# Patient Record
Sex: MALE | Race: WHITE | NOT HISPANIC OR LATINO | Employment: OTHER | URBAN - METROPOLITAN AREA
[De-identification: names, ages, dates, MRNs, and addresses within clinical notes are randomized per-mention and may not be internally consistent; named-entity substitution may affect disease eponyms.]

---

## 2017-02-12 ENCOUNTER — HOSPITAL ENCOUNTER (EMERGENCY)
Facility: HOSPITAL | Age: 57
Discharge: HOME | End: 2017-02-12
Attending: EMERGENCY MEDICINE
Payer: MEDICAID

## 2017-02-12 VITALS
RESPIRATION RATE: 14 BRPM | HEART RATE: 94 BPM | HEIGHT: 68 IN | SYSTOLIC BLOOD PRESSURE: 123 MMHG | OXYGEN SATURATION: 96 % | BODY MASS INDEX: 31.83 KG/M2 | DIASTOLIC BLOOD PRESSURE: 74 MMHG | WEIGHT: 210 LBS | TEMPERATURE: 99 F

## 2017-02-12 DIAGNOSIS — I48.91 ATRIAL FIBRILLATION WITH RVR: Primary | ICD-10-CM

## 2017-02-12 LAB
ALBUMIN SERPL BCP-MCNC: 3.8 G/DL
ALP SERPL-CCNC: 52 U/L
ALT SERPL W/O P-5'-P-CCNC: 40 U/L
AMPHET+METHAMPHET UR QL: NEGATIVE
ANION GAP SERPL CALC-SCNC: 10 MMOL/L
AST SERPL-CCNC: 32 U/L
BACTERIA #/AREA URNS HPF: NORMAL /HPF
BARBITURATES UR QL SCN>200 NG/ML: NEGATIVE
BASOPHILS # BLD AUTO: 0.05 K/UL
BASOPHILS NFR BLD: 0.6 %
BENZODIAZ UR QL SCN>200 NG/ML: NEGATIVE
BILIRUB SERPL-MCNC: 0.4 MG/DL
BILIRUB UR QL STRIP: NEGATIVE
BUN SERPL-MCNC: 19 MG/DL
BZE UR QL SCN: NEGATIVE
CALCIUM SERPL-MCNC: 9.4 MG/DL
CANNABINOIDS UR QL SCN: NEGATIVE
CHLORIDE SERPL-SCNC: 108 MMOL/L
CLARITY UR: CLEAR
CO2 SERPL-SCNC: 25 MMOL/L
COLOR UR: COLORLESS
CREAT SERPL-MCNC: 0.9 MG/DL
CREAT UR-MCNC: 15.6 MG/DL
DIFFERENTIAL METHOD: NORMAL
EOSINOPHIL # BLD AUTO: 0.3 K/UL
EOSINOPHIL NFR BLD: 3.5 %
ERYTHROCYTE [DISTWIDTH] IN BLOOD BY AUTOMATED COUNT: 13.8 %
EST. GFR  (AFRICAN AMERICAN): >60 ML/MIN/1.73 M^2
EST. GFR  (NON AFRICAN AMERICAN): >60 ML/MIN/1.73 M^2
ETHANOL SERPL-MCNC: <10 MG/DL
GLUCOSE SERPL-MCNC: 141 MG/DL
GLUCOSE UR QL STRIP: NEGATIVE
HCT VFR BLD AUTO: 44.3 %
HGB BLD-MCNC: 15.2 G/DL
HGB UR QL STRIP: ABNORMAL
KETONES UR QL STRIP: NEGATIVE
LEUKOCYTE ESTERASE UR QL STRIP: NEGATIVE
LYMPHOCYTES # BLD AUTO: 3.7 K/UL
LYMPHOCYTES NFR BLD: 41.1 %
MAGNESIUM SERPL-MCNC: 2.3 MG/DL
MCH RBC QN AUTO: 30.7 PG
MCHC RBC AUTO-ENTMCNC: 34.3 %
MCV RBC AUTO: 90 FL
METHADONE UR QL SCN>300 NG/ML: NEGATIVE
MICROSCOPIC COMMENT: NORMAL
MONOCYTES # BLD AUTO: 0.8 K/UL
MONOCYTES NFR BLD: 9.1 %
NEUTROPHILS # BLD AUTO: 4.1 K/UL
NEUTROPHILS NFR BLD: 45.7 %
NITRITE UR QL STRIP: NEGATIVE
OPIATES UR QL SCN: NEGATIVE
PCP UR QL SCN>25 NG/ML: NEGATIVE
PH UR STRIP: 7 [PH] (ref 5–8)
PHOSPHATE SERPL-MCNC: 2.9 MG/DL
PLATELET # BLD AUTO: 270 K/UL
PMV BLD AUTO: 9.8 FL
POTASSIUM SERPL-SCNC: 3.7 MMOL/L
PROT SERPL-MCNC: 6.9 G/DL
PROT UR QL STRIP: NEGATIVE
RBC # BLD AUTO: 4.95 M/UL
RBC #/AREA URNS HPF: 2 /HPF (ref 0–4)
SODIUM SERPL-SCNC: 143 MMOL/L
SP GR UR STRIP: 1 (ref 1–1.03)
TOXICOLOGY INFORMATION: ABNORMAL
TROPONIN I SERPL DL<=0.01 NG/ML-MCNC: 0.01 NG/ML
TSH SERPL DL<=0.005 MIU/L-ACNC: 1.87 UIU/ML
URN SPEC COLLECT METH UR: ABNORMAL
UROBILINOGEN UR STRIP-ACNC: NEGATIVE EU/DL
WBC # BLD AUTO: 9.02 K/UL
WBC #/AREA URNS HPF: 1 /HPF (ref 0–5)

## 2017-02-12 PROCEDURE — 84443 ASSAY THYROID STIM HORMONE: CPT

## 2017-02-12 PROCEDURE — 83735 ASSAY OF MAGNESIUM: CPT

## 2017-02-12 PROCEDURE — 96374 THER/PROPH/DIAG INJ IV PUSH: CPT

## 2017-02-12 PROCEDURE — 82570 ASSAY OF URINE CREATININE: CPT

## 2017-02-12 PROCEDURE — 81000 URINALYSIS NONAUTO W/SCOPE: CPT

## 2017-02-12 PROCEDURE — 84484 ASSAY OF TROPONIN QUANT: CPT

## 2017-02-12 PROCEDURE — 96376 TX/PRO/DX INJ SAME DRUG ADON: CPT

## 2017-02-12 PROCEDURE — 84100 ASSAY OF PHOSPHORUS: CPT

## 2017-02-12 PROCEDURE — 80320 DRUG SCREEN QUANTALCOHOLS: CPT

## 2017-02-12 PROCEDURE — 85025 COMPLETE CBC W/AUTO DIFF WBC: CPT

## 2017-02-12 PROCEDURE — 25000003 PHARM REV CODE 250: Performed by: EMERGENCY MEDICINE

## 2017-02-12 PROCEDURE — 80053 COMPREHEN METABOLIC PANEL: CPT

## 2017-02-12 PROCEDURE — 99285 EMERGENCY DEPT VISIT HI MDM: CPT | Mod: 25

## 2017-02-12 RX ORDER — DILTIAZEM HYDROCHLORIDE 5 MG/ML
15 INJECTION INTRAVENOUS
Status: COMPLETED | OUTPATIENT
Start: 2017-02-12 | End: 2017-02-12

## 2017-02-12 RX ORDER — DILTIAZEM HYDROCHLORIDE 120 MG/1
120 CAPSULE, EXTENDED RELEASE ORAL DAILY
Qty: 14 CAPSULE | Refills: 0 | Status: ON HOLD | OUTPATIENT
Start: 2017-02-12 | End: 2017-04-09

## 2017-02-12 RX ORDER — DILTIAZEM HYDROCHLORIDE 120 MG/1
120 CAPSULE, COATED, EXTENDED RELEASE ORAL
Status: COMPLETED | OUTPATIENT
Start: 2017-02-12 | End: 2017-02-12

## 2017-02-12 RX ADMIN — DILTIAZEM HYDROCHLORIDE 15 MG: 5 INJECTION INTRAVENOUS at 04:02

## 2017-02-12 RX ADMIN — NIFEDIPINE 120 MG: 10 CAPSULE ORAL at 06:02

## 2017-02-12 RX ADMIN — DILTIAZEM HYDROCHLORIDE 15 MG: 5 INJECTION INTRAVENOUS at 05:02

## 2017-02-12 NOTE — ED NOTES
Pt reports that he feels much better from the medicine and can feel that his heart beat has slowed down.

## 2017-02-12 NOTE — ED PROVIDER NOTES
Encounter Date: 2/12/2017    SCRIBE #1 NOTE: I, Cookie Scales, am scribing for, and in the presence of,  Vishal Baum MD. I have scribed the following portions of the note - Other sections scribed: HPI, ROS.       History     Chief Complaint   Patient presents with    Palpitations     Ems reports the pt c/o heart palpitation while sleeping that woke him up. Pt a fib with rvr      Review of patient's allergies indicates:   Allergen Reactions    Eggs [egg derived] Anaphylaxis    Ativan [lorazepam]      nausea    Corticosteroids (glucocorticoids) Palpitations     HPI Comments: CC: Palpitations    HPI: 56 year old male smoker with A-fib, chronic back pain, and anxiety presents to the ED via EMS c/o acute onset palpitations with associated SOB, chills, and light-headedness PTA. Pt states he was awake and lying down when the symptoms began. Previous similar episode 8 months ago. Pt states he would go into A-fib and then have a normal heart rate spontaneously right after. Pt denies fever, chest pain, nausea, vomiting, diarrhea, and any other associated symptoms. No prior treatment.    The history is provided by the patient. No  was used.     Past Medical History   Diagnosis Date    Anxiety     Atrial fibrillation     Chronic back pain      Past Medical History Pertinent Negatives   Diagnosis Date Noted    Diabetes mellitus 8/21/2013    Hypertension 6/15/2013    Renal disorder 6/15/2013    Seizures 6/15/2013    Stroke 6/15/2013     Past Surgical History   Procedure Laterality Date    Cholecystectomy      Hernia repair      Nasal septum surgery      Rectal surgery       History reviewed. No pertinent family history.  Social History   Substance Use Topics    Smoking status: Current Every Day Smoker     Packs/day: 0.50     Types: Cigarettes     Last attempt to quit: 12/7/2010    Smokeless tobacco: Never Used    Alcohol use No     Review of Systems   Constitutional: Positive for chills.  Negative for diaphoresis and fever.   HENT: Negative for ear pain and sore throat.    Eyes: Negative for photophobia and visual disturbance.   Respiratory: Positive for shortness of breath. Negative for cough.    Cardiovascular: Positive for palpitations. Negative for chest pain.   Gastrointestinal: Negative for abdominal pain, diarrhea, nausea and vomiting.   Genitourinary: Negative for dysuria.   Musculoskeletal: Negative for back pain.   Skin: Negative for rash.   Neurological: Positive for light-headedness. Negative for headaches.       Physical Exam   Initial Vitals   BP Pulse Resp Temp SpO2   02/12/17 0358 02/12/17 0358 02/12/17 0358 02/12/17 0358 02/12/17 0358   170/104 120 18 98.8 °F (37.1 °C) 98 %     Physical Exam    Constitutional: He appears well-developed and well-nourished. He is not diaphoretic. No distress.   HENT:   Head: Normocephalic and atraumatic.   Nose: Nose normal.   Mouth/Throat: Oropharynx is clear and moist. No oropharyngeal exudate.   Eyes: Conjunctivae and EOM are normal. Pupils are equal, round, and reactive to light. No scleral icterus.   Neck: Normal range of motion. Neck supple. No thyromegaly present. No tracheal deviation present.   Cardiovascular: Normal heart sounds. An irregularly irregular rhythm present. Tachycardia present.  Exam reveals no gallop and no friction rub.    No murmur heard.  Pulmonary/Chest: Breath sounds normal. No respiratory distress. He has no wheezes. He has no rhonchi. He has no rales.   Abdominal: Soft. Bowel sounds are normal. He exhibits no distension and no mass. There is no tenderness. There is no rebound and no guarding.   Musculoskeletal: Normal range of motion. He exhibits edema (trace pitting b/l). He exhibits no tenderness.   Lymphadenopathy:     He has no cervical adenopathy.   Neurological: He is alert and oriented to person, place, and time. He has normal strength. No cranial nerve deficit or sensory deficit.   Skin: Skin is warm and dry. No  rash noted. No erythema. No pallor.   Psychiatric: He has a normal mood and affect. His behavior is normal. Thought content normal.         ED Course   Procedures  Labs Reviewed   CBC W/ AUTO DIFFERENTIAL   COMPREHENSIVE METABOLIC PANEL   TROPONIN I   TSH   MAGNESIUM   PHOSPHORUS   ALCOHOL,MEDICAL (ETHANOL)   DRUG SCREEN PANEL, URINE EMERGENCY   URINALYSIS             Medical Decision Making:   Initial Assessment:   56-year-old male who reports one past episode of atrial fibrillation that spontaneously resolved brought in by EMS for sudden onset severe shortness of breath that began immediately prior to arrival.  EMS reports the, patient in atrial fibrillation with RVR.  Patient now reports he is feeling much better, but currently on exam his heart rate is averaging 130s to 140s, with peaks in the 160s.  There is no evidence of heart failure.  The patient has had no symptoms to suggest acute coronary syndrome, including no vomiting, diaphoresis, chest pain.   Differential Diagnosis:   Paroxysmal atrial fibrillation, electrolyte abnormality, medication/drug effect, medication/drug withdrawal, a right abnormality, infection, dehydration, other metabolic disturbance  Independently Interpreted Test(s):   I have ordered and independently interpreted X-rays - see summary below.       <> Summary of X-Ray Reading(s): Chest x-ray: No acute abnormality  I have ordered and independently interpreted EKG Reading(s) - see summary below       <> Summary of EKG Reading(s): Atrial fibrillation at 136 bpm, otherwise normal intervals, right axis deviation, no acute ischemic change  ED Management:  Patient's workup does not reveal any significant abnormality.  His heart rate is well controlled after 25 mg of IV diltiazem, now averaging .  He is very strongly resistant to the idea of taking a beta blocker, stating that he has heard to any people with side effects of lethargy, depression, etc.  Will prescribe sustained released  calcium channel blocker as an alternative.  I have recommended follow-up with primary care and cardiology and counseled him regarding the results of his workup, providing return precautions.            Scribe Attestation:   Scribe #1: I performed the above scribed service and the documentation accurately describes the services I performed. I attest to the accuracy of the note.    Attending Attestation:           Physician Attestation for Scribe:  Physician Attestation Statement for Scribe #1: I, Vishal Baum MD, reviewed documentation, as scribed by Cookie Scales in my presence, and it is both accurate and complete.                 ED Course     Clinical Impression:   The encounter diagnosis was Atrial fibrillation with RVR.          Vishal Baum III, MD  02/12/17 0611

## 2017-02-12 NOTE — ED TRIAGE NOTES
"Pt reports to ED via EMS for c/o palpitations, SOB, & dizziness starting tonight while he was at home listening to music; upon ems arrival pt in A Fib with RVR's; pt has hx of A fib with last "A fib attack" in June 2016; pt AAOx4; pt currently denies feeling palpitations, SOB, and dizziness and states that he is feeling better and his "A fib will go back to normal within the hour"; pt tachycardic with HR ranging 130's-150's; will continue to monitor.  "

## 2017-02-12 NOTE — DISCHARGE INSTRUCTIONS
Return to the emergency department if you develop difficulty breathing, lightheadedness, racing heartbeat, chest pain, or for any new or worsening medical concerns.        What Is Atrial Flutter/Atrial Fibrillation?      The heart has its own electrical system. This system makes the signals that start each heartbeat. The heartbeat begins in 1 of the 2 upper chambers of the heart (atria). A problem can make the atria beat faster than normal. The atria may beat fast but still evenly. This problem is called atrial flutter. If the atria beat very fast and also unevenly, it is called atrial fibrillation (AFib).  Causes of Atrial Flutter and Atrial Fibrillation  Causes of these problems can include:  · Previous heart attack  · High blood pressure  · Thyroid problems  In many cases, the cause is unknown.  When the Atria Beat Too Fast  The atria may beat fast only once in a while. This is called a paroxysmal heart rhythm problem. If they beat fast all the time, it is a chronic problem.  Atrial Flutter  With atrial flutter, electrical signals travel around and around inside the atria. These circling signals make the atria beat too fast:  · Atrial flutter can cause symptoms similar to AFib. It can also lead to the even faster, uneven rhythms of AFib.  Atrial Fibrillation (AFib)  With AFib, cells in the atria send extra electrical signals. These extra signals make the atria beat very fast. They also beat unevenly:  · The atria beat so fast and unevenly that they may quiver instead of lane. If the atria dont contract, they dont move enough blood into the 2 lower chambers of the heart (ventricles). This can cause you to feel dizzy or weak.  · Blood that doesnt keep moving can pool and form clots in the atria. These clots can move into other parts of the body and cause serious problems such as a stroke.   Symptoms of Atrial Flutter and AFib  These symptoms include the following:  · Palpitations (a fluttering, fast  heartbeat)  · Weakness or tiredness  · Shortness of breath  · Chest pain or tightness  · Dizziness or lightheadedness  · Fainting spells   Date Last Reviewed: 2/26/2014  © 1777-3041 [employer]. [address] [place] [address], [place] [address]. All rights reserved. This information is not intended as a substitute for professional medical care. Always follow your healthcare professional's instructions.          Discharge Instructions: Taking Calcium Channel Blockers  Your healthcare provider prescribed a medicine called a calcium channel blocker for you. This type of medicine can treat high blood pressure, correct abnormal heart rhythms, and relieve a type of chest pain called angina.     The name of your calcium channel blocker is  _____________________      Home care  · Follow the fact sheet that came with your medicine. It tells you when and how to take your medicine. Ask for a sheet if you didnt get one.  · Take this medicine exactly as directed, even if you feel fine.  · If you miss a dose of this medicine, take it as soon as you remember--unless its almost time for your next dose. In that case, just wait and take your next dose at the normal time. Dont take a double dose. If you aren't sure what to do, call your healthcare provider or your pharmacist.  · Dont drive until you know how you will react to this medicine.  · Tell your healthcare provider about any other medicines or herbal remedies you are using.  · Be sure to give this medicine time to work. It may take several weeks to lower blood pressure.  · Learn to take your own pulse. Keep a record of your results. Ask your provider which pulse rates mean that you need medical attention.  · Dont eat grapefruit or drink grapefruit juice. These may interact with calcium channel blockers.  · Ask your healthcare provider how much exercise and activity is safe.  · See your provider regularly while taking this medicine.  Possible side effects  Tell your  healthcare provider if you have any of these side effects. Dont stop taking the medicine unless your doctor tells you to. Mild side effects include:  · Sore, bleeding gums  · Mild headache  · Dizziness or lightheadedness  · Flushing  · Nausea  · Leg swelling  When to call your healthcare provider  Call your healthcare provider right away if you have any of the following:  · Severe headache  · Slow, weak pulse  · Breathing problems, coughing, or wheezing  · Irregular, fast, or pounding heartbeat  · Skin rash  · Swollen ankles, feet, or lower legs  · Constipation   Date Last Reviewed: 6/1/2016  © 2650-5942 [employer]. [address] [place] [address], [place] [address]. All rights reserved. This information is not intended as a substitute for professional medical care. Always follow your healthcare professional's instructions.

## 2017-02-12 NOTE — ED AVS SNAPSHOT
[facility]  [address] [place] [address]               Timmy Wade   2017  4:09 AM   ED    Description:  Male : 1960   Department:  [facility]           Your Care was Coordinated By:     Provider Role From To    Vishal Baum III, MD Attending Provider 17 0415 --      Reason for Visit     Palpitations           Diagnoses this Visit        Comments    Atrial fibrillation with RVR    -  Primary       ED Disposition     None           To Do List           Follow-up Information     Follow up with Eulogio Owusu MD. Call in 1 day.    Specialty:  Cardiology    Why:  for Cardiology    Contact information:    [address]  [address]  [place] [address]  [phone]          Follow up with Keshia Lion MD. Call in 1 day.    Specialty:  Family Medicine    Why:  for Primary Care    Contact information:    [address] [address]  [phone]         These Medications        Disp Refills Start End    diltiaZEM (DILACOR XR) 120 MG CDCR 14 capsule 0 2017    Take 1 capsule (120 mg total) by mouth once daily. - Oral    Pharmacy: [facility] [address] #: [phone]         [facility] On Call     [facility] On Call Nurse Care Line -  Assistance  Registered nurses in the [facility] provide clinical advisement, health education, appointment booking, and other advisory services.  Call for this free service at [phone].             Medications           Message regarding Medications     Verify the changes and/or additions to your medication regime listed below are the same as discussed with your clinician today.  If any of these changes or additions are incorrect, please notify your healthcare provider.        START taking these NEW medications        Refills    diltiaZEM (DILACOR XR) 120 MG CDCR 0    Sig: Take 1 capsule (120 mg  "total) by mouth once daily.    Class: Print    Route: Oral      These medications were administered today        Dose Freq    diltiaZEM injection 15 mg 15 mg ED 1 Time    Sig: Inject 3 mLs (15 mg total) into the vein ED 1 Time.    Class: Normal    Route: Intravenous    diltiaZEM injection 15 mg 15 mg ED 1 Time    Sig: Inject 3 mLs (15 mg total) into the vein ED 1 Time.    Class: Normal    Route: Intravenous    diltiaZEM 24 hr capsule 120 mg 120 mg ED 1 Time    Sig: Take 1 capsule (120 mg total) by mouth ED 1 Time.    Class: Normal    Route: Oral      STOP taking these medications     budesonide (RINOCORT AQUA) 32 mcg/actuation nasal spray 1 spray (32 mcg total) by Nasal route once daily.           Verify that the below list of medications is an accurate representation of the medications you are currently taking.  If none reported, the list may be blank. If incorrect, please contact your healthcare provider. Carry this list with you in case of emergency.           Current Medications     clonazepam (KLONOPIN) 2 MG Tab Take 2 mg by mouth 2 (two) times daily.      oxycodone-acetaminophen (PERCOCET)  mg per tablet Take 1 tablet by mouth every 4 (four) hours as needed for Pain.    diltiaZEM (DILACOR XR) 120 MG CDCR Take 1 capsule (120 mg total) by mouth once daily.    diltiaZEM 24 hr capsule 120 mg Take 1 capsule (120 mg total) by mouth ED 1 Time.    diltiaZEM injection 15 mg Inject 3 mLs (15 mg total) into the vein ED 1 Time.    diltiaZEM injection 15 mg Inject 3 mLs (15 mg total) into the vein ED 1 Time.           Clinical Reference Information           Your Vitals Were     BP Pulse Temp Resp Height Weight    123/73 100 98.8 °F (37.1 °C) (Oral) 18 5' 8" (1.727 m) 95.3 kg (210 lb)    SpO2 BMI             96% 31.93 kg/m2         Allergies as of 2/12/2017        Reactions    Eggs [Egg Derived] Anaphylaxis    Ativan [Lorazepam]     nausea    Corticosteroids (Glucocorticoids) Palpitations      Immunizations " Administered on Date of Encounter - 2/12/2017     None      ED Micro, Lab, POCT     Start Ordered       Status Ordering Provider    02/12/17 0417 02/12/17 0417  CBC auto differential  STAT      Final result     02/12/17 0417 02/12/17 0417  Comprehensive metabolic panel  STAT      Final result     02/12/17 0417 02/12/17 0417  Troponin I  STAT      Final result     02/12/17 0417 02/12/17 0417  TSH  STAT      Final result     02/12/17 0417 02/12/17 0417  Magnesium  STAT      Final result     02/12/17 0417 02/12/17 0417  Phosphorus  STAT      Final result     02/12/17 0417 02/12/17 0417  Ethanol  Once      Final result     02/12/17 0417 02/12/17 0417  Drug screen panel, emergency  STAT      Final result     02/12/17 0417 02/12/17 0417  Urinalysis  STAT      Final result     02/12/17 0417 02/12/17 0417  Urinalysis Microscopic  Once      Final result       ED Imaging Orders     Start Ordered       Status Ordering Provider    02/12/17 0417 02/12/17 0417  X-Ray Chest 1 View  1 time imaging      Final result         Discharge Instructions       Return to the emergency department if you develop difficulty breathing, lightheadedness, racing heartbeat, chest pain, or for any new or worsening medical concerns.        What Is Atrial Flutter/Atrial Fibrillation?      The heart has its own electrical system. This system makes the signals that start each heartbeat. The heartbeat begins in 1 of the 2 upper chambers of the heart (atria). A problem can make the atria beat faster than normal. The atria may beat fast but still evenly. This problem is called atrial flutter. If the atria beat very fast and also unevenly, it is called atrial fibrillation (AFib).  Causes of Atrial Flutter and Atrial Fibrillation  Causes of these problems can include:  · Previous heart attack  · High blood pressure  · Thyroid problems  In many cases, the cause is unknown.  When the Atria Beat Too Fast  The atria may beat fast only once in a while. This is  called a paroxysmal heart rhythm problem. If they beat fast all the time, it is a chronic problem.  Atrial Flutter  With atrial flutter, electrical signals travel around and around inside the atria. These circling signals make the atria beat too fast:  · Atrial flutter can cause symptoms similar to AFib. It can also lead to the even faster, uneven rhythms of AFib.  Atrial Fibrillation (AFib)  With AFib, cells in the atria send extra electrical signals. These extra signals make the atria beat very fast. They also beat unevenly:  · The atria beat so fast and unevenly that they may quiver instead of lane. If the atria dont contract, they dont move enough blood into the 2 lower chambers of the heart (ventricles). This can cause you to feel dizzy or weak.  · Blood that doesnt keep moving can pool and form clots in the atria. These clots can move into other parts of the body and cause serious problems such as a stroke.   Symptoms of Atrial Flutter and AFib  These symptoms include the following:  · Palpitations (a fluttering, fast heartbeat)  · Weakness or tiredness  · Shortness of breath  · Chest pain or tightness  · Dizziness or lightheadedness  · Fainting spells   Date Last Reviewed: 2/26/2014  © 6182-9935 [employer]. [address] [place] [address]. All rights reserved. This information is not intended as a substitute for professional medical care. Always follow your healthcare professional's instructions.          Discharge Instructions: Taking Calcium Channel Blockers  Your healthcare provider prescribed a medicine called a calcium channel blocker for you. This type of medicine can treat high blood pressure, correct abnormal heart rhythms, and relieve a type of chest pain called angina.     The name of your calcium channel blocker is  _____________________      Home care  · Follow the fact sheet that came with your medicine. It tells you when and how to take your medicine. Ask for a  sheet if you didnt get one.  · Take this medicine exactly as directed, even if you feel fine.  · If you miss a dose of this medicine, take it as soon as you remember--unless its almost time for your next dose. In that case, just wait and take your next dose at the normal time. Dont take a double dose. If you aren't sure what to do, call your healthcare provider or your pharmacist.  · Dont drive until you know how you will react to this medicine.  · Tell your healthcare provider about any other medicines or herbal remedies you are using.  · Be sure to give this medicine time to work. It may take several weeks to lower blood pressure.  · Learn to take your own pulse. Keep a record of your results. Ask your provider which pulse rates mean that you need medical attention.  · Dont eat grapefruit or drink grapefruit juice. These may interact with calcium channel blockers.  · Ask your healthcare provider how much exercise and activity is safe.  · See your provider regularly while taking this medicine.  Possible side effects  Tell your healthcare provider if you have any of these side effects. Dont stop taking the medicine unless your doctor tells you to. Mild side effects include:  · Sore, bleeding gums  · Mild headache  · Dizziness or lightheadedness  · Flushing  · Nausea  · Leg swelling  When to call your healthcare provider  Call your healthcare provider right away if you have any of the following:  · Severe headache  · Slow, weak pulse  · Breathing problems, coughing, or wheezing  · Irregular, fast, or pounding heartbeat  · Skin rash  · Swollen ankles, feet, or lower legs  · Constipation   Date Last Reviewed: 6/1/2016  © 9795-1080 [employer]. [address] [place] [address], [place] [address]. All rights reserved. This information is not intended as a substitute for professional medical care. Always follow your healthcare professional's instructions.          [facility] Sign-Up     Activating your [facility]  account is as easy as 1-2-3!     1) Visit [email or web address], select Sign Up Now, enter this activation code and your date of birth, then select Next.  [email or web address]  Expires: 3/29/2017  6:03 AM      2) Create a username and password to use when you visit [facility] in the future and select a security question in case you lose your password and select Next.    3) Enter your e-mail address and click Sign Up!    Additional Information  If you have questions, please e-mail [email or web address] or call [phone] to talk to our [facility] staff. Remember, [facility] is NOT to be used for urgent needs. For medical emergencies, dial 911.         Smoking Cessation     If you would like to quit smoking:   You may be eligible for free services if you are a [place] resident and started smoking cigarettes before September 1, 1988.  Call the Smoking Cessation Trust ([facility]) toll free at (424) [phone] or [phone].   Call 1-287-QUIT-NOW if you do not meet the above criteria.             [facility] complies with applicable Federal civil rights laws and does not discriminate on the basis of race, color, national origin, age, disability, or sex.        Language Assistance Services     ATTENTION: Language assistance services are available, free of charge. Please call [phone].      ATENCIÓN: Si habla español, tiene a ferro disposición servicios gratuitos de asistencia lingüística. Llame al [phone].     CHÚ Ý: N?u b?n nói Ti?ng Vi?t, có các d?ch v? h? tr? ngôn ng? mi?n phí dành cho b?n. G?i s? [phone].

## 2017-04-08 ENCOUNTER — HOSPITAL ENCOUNTER (INPATIENT)
Facility: HOSPITAL | Age: 57
LOS: 1 days | Discharge: HOME | DRG: 310 | End: 2017-04-09
Attending: EMERGENCY MEDICINE | Admitting: HOSPITALIST
Payer: MEDICAID

## 2017-04-08 DIAGNOSIS — I48.91 ATRIAL FIBRILLATION WITH RAPID VENTRICULAR RESPONSE: Primary | ICD-10-CM

## 2017-04-08 PROBLEM — I48.0 PAROXYSMAL ATRIAL FIBRILLATION: Chronic | Status: ACTIVE | Noted: 2017-04-08

## 2017-04-08 PROBLEM — Z72.0 TOBACCO ABUSE: Chronic | Status: ACTIVE | Noted: 2017-04-08

## 2017-04-08 PROBLEM — E66.9 OBESITY (BMI 35.0-39.9 WITHOUT COMORBIDITY): Chronic | Status: ACTIVE | Noted: 2017-04-08

## 2017-04-08 LAB
ALBUMIN SERPL BCP-MCNC: 3.9 G/DL
ALP SERPL-CCNC: 57 U/L
ALT SERPL W/O P-5'-P-CCNC: 57 U/L
ANION GAP SERPL CALC-SCNC: 9 MMOL/L
APTT BLDCRRT: 27.3 SEC
AST SERPL-CCNC: 47 U/L
BASOPHILS # BLD AUTO: 0.04 K/UL
BASOPHILS NFR BLD: 0.3 %
BILIRUB SERPL-MCNC: 0.6 MG/DL
BNP SERPL-MCNC: 25 PG/ML
BUN SERPL-MCNC: 16 MG/DL
CALCIUM SERPL-MCNC: 9.5 MG/DL
CHLORIDE SERPL-SCNC: 107 MMOL/L
CO2 SERPL-SCNC: 24 MMOL/L
CREAT SERPL-MCNC: 1 MG/DL
DIFFERENTIAL METHOD: ABNORMAL
EOSINOPHIL # BLD AUTO: 0.1 K/UL
EOSINOPHIL NFR BLD: 0.9 %
ERYTHROCYTE [DISTWIDTH] IN BLOOD BY AUTOMATED COUNT: 13.7 %
EST. GFR  (AFRICAN AMERICAN): >60 ML/MIN/1.73 M^2
EST. GFR  (NON AFRICAN AMERICAN): >60 ML/MIN/1.73 M^2
GLUCOSE SERPL-MCNC: 112 MG/DL
HCT VFR BLD AUTO: 46.8 %
HGB BLD-MCNC: 15.9 G/DL
INR PPP: 1
LYMPHOCYTES # BLD AUTO: 3.2 K/UL
LYMPHOCYTES NFR BLD: 27.8 %
MCH RBC QN AUTO: 29.6 PG
MCHC RBC AUTO-ENTMCNC: 34 %
MCV RBC AUTO: 87 FL
MONOCYTES # BLD AUTO: 1.1 K/UL
MONOCYTES NFR BLD: 9 %
NEUTROPHILS # BLD AUTO: 7.2 K/UL
NEUTROPHILS NFR BLD: 61.7 %
PLATELET # BLD AUTO: 308 K/UL
PMV BLD AUTO: 10.1 FL
POTASSIUM SERPL-SCNC: 4.3 MMOL/L
PROT SERPL-MCNC: 7.1 G/DL
PROTHROMBIN TIME: 10.2 SEC
RBC # BLD AUTO: 5.37 M/UL
SODIUM SERPL-SCNC: 140 MMOL/L
TROPONIN I SERPL DL<=0.01 NG/ML-MCNC: 0.02 NG/ML
TROPONIN I SERPL DL<=0.01 NG/ML-MCNC: 0.03 NG/ML
TSH SERPL DL<=0.005 MIU/L-ACNC: 0.64 UIU/ML
WBC # BLD AUTO: 11.65 K/UL

## 2017-04-08 PROCEDURE — 20000000 HC ICU ROOM

## 2017-04-08 PROCEDURE — 83880 ASSAY OF NATRIURETIC PEPTIDE: CPT

## 2017-04-08 PROCEDURE — 96366 THER/PROPH/DIAG IV INF ADDON: CPT

## 2017-04-08 PROCEDURE — 84484 ASSAY OF TROPONIN QUANT: CPT | Mod: 91

## 2017-04-08 PROCEDURE — 80053 COMPREHEN METABOLIC PANEL: CPT

## 2017-04-08 PROCEDURE — 96376 TX/PRO/DX INJ SAME DRUG ADON: CPT

## 2017-04-08 PROCEDURE — 36415 COLL VENOUS BLD VENIPUNCTURE: CPT

## 2017-04-08 PROCEDURE — 63600175 PHARM REV CODE 636 W HCPCS: Performed by: EMERGENCY MEDICINE

## 2017-04-08 PROCEDURE — 25000003 PHARM REV CODE 250: Performed by: INTERNAL MEDICINE

## 2017-04-08 PROCEDURE — 96365 THER/PROPH/DIAG IV INF INIT: CPT

## 2017-04-08 PROCEDURE — 99285 EMERGENCY DEPT VISIT HI MDM: CPT | Mod: 25

## 2017-04-08 PROCEDURE — 84443 ASSAY THYROID STIM HORMONE: CPT

## 2017-04-08 PROCEDURE — 85025 COMPLETE CBC W/AUTO DIFF WBC: CPT

## 2017-04-08 PROCEDURE — 85730 THROMBOPLASTIN TIME PARTIAL: CPT

## 2017-04-08 PROCEDURE — 85610 PROTHROMBIN TIME: CPT

## 2017-04-08 PROCEDURE — 84484 ASSAY OF TROPONIN QUANT: CPT

## 2017-04-08 RX ORDER — ONDANSETRON 2 MG/ML
4 INJECTION INTRAMUSCULAR; INTRAVENOUS EVERY 12 HOURS PRN
Status: DISCONTINUED | OUTPATIENT
Start: 2017-04-08 | End: 2017-04-08

## 2017-04-08 RX ORDER — ENOXAPARIN SODIUM 100 MG/ML
100 INJECTION SUBCUTANEOUS
Status: DISCONTINUED | OUTPATIENT
Start: 2017-04-08 | End: 2017-04-09

## 2017-04-08 RX ORDER — RAMELTEON 8 MG/1
8 TABLET ORAL NIGHTLY PRN
Status: DISCONTINUED | OUTPATIENT
Start: 2017-04-08 | End: 2017-04-09 | Stop reason: HOSPADM

## 2017-04-08 RX ORDER — OXYCODONE AND ACETAMINOPHEN 10; 325 MG/1; MG/1
1 TABLET ORAL EVERY 4 HOURS PRN
Status: DISCONTINUED | OUTPATIENT
Start: 2017-04-08 | End: 2017-04-08

## 2017-04-08 RX ORDER — IBUPROFEN 200 MG
1 TABLET ORAL DAILY
Status: DISCONTINUED | OUTPATIENT
Start: 2017-04-08 | End: 2017-04-09 | Stop reason: HOSPADM

## 2017-04-08 RX ORDER — ONDANSETRON 2 MG/ML
8 INJECTION INTRAMUSCULAR; INTRAVENOUS EVERY 8 HOURS PRN
Status: DISCONTINUED | OUTPATIENT
Start: 2017-04-08 | End: 2017-04-09 | Stop reason: HOSPADM

## 2017-04-08 RX ORDER — ACETAMINOPHEN 500 MG
500 TABLET ORAL EVERY 6 HOURS PRN
Status: DISCONTINUED | OUTPATIENT
Start: 2017-04-08 | End: 2017-04-09 | Stop reason: HOSPADM

## 2017-04-08 RX ADMIN — NICOTINE 1 PATCH: 21 PATCH, EXTENDED RELEASE TRANSDERMAL at 09:04

## 2017-04-08 RX ADMIN — AMIODARONE HYDROCHLORIDE 1 MG/MIN: 1.8 INJECTION, SOLUTION INTRAVENOUS at 08:04

## 2017-04-08 RX ADMIN — ENOXAPARIN SODIUM 100 MG: 100 INJECTION SUBCUTANEOUS at 08:04

## 2017-04-08 RX ADMIN — AMIODARONE HYDROCHLORIDE 0.5 MG/MIN: 1.8 INJECTION, SOLUTION INTRAVENOUS at 09:04

## 2017-04-08 RX ADMIN — AMIODARONE HYDROCHLORIDE 150 MG: 1.5 INJECTION, SOLUTION INTRAVENOUS at 02:04

## 2017-04-08 RX ADMIN — AMIODARONE HYDROCHLORIDE 1 MG/MIN: 1.8 INJECTION, SOLUTION INTRAVENOUS at 03:04

## 2017-04-08 NOTE — ED TRIAGE NOTES
"Pt arrived via [facility] from home. CC of anxiety. Pt stated "I woke up this morning and my heart was just fluttering. I couldn't feel a beat, it was just moving so fast." Pt presents with no CP, pt states he is unable to describe what he felt, pt stated "It takes a while for your body to withdraw from benzos, so i think its just happening to me." pt denies N/V/F/D/SOB. Will continue to monitor  "

## 2017-04-08 NOTE — IP AVS SNAPSHOT
[place] [address] [address] [address]  Phone: [phone]           Patient Discharge Instructions   Our goal is to set you up for success. This packet includes information on your condition, medications, and your home care.  It will help you care for yourself to prevent having to return to the hospital.     Please ask your nurse if you have any questions.      There are many details to remember when preparing to leave the hospital. Here is what you will need to do:    1. Take your medicine. If you are prescribed medications, review your Medication List on the following pages. You may have new medications to  at the pharmacy and others that you'll need to stop taking. Review the instructions for how and when to take your medications. Talk with your doctor or nurses if you are unsure of what to do.     2. Go to your follow-up appointments. Specific follow-up information is listed in the following pages. Your may be contacted by a nurse or clinical provider about future appointments. Be sure we have all of the phone numbers to reach you. Please contact your provider's office if you are unable to make an appointment.     3. Watch for warning signs. Your doctor or nurse will give you detailed warning signs to watch for and when to call for assistance. These instructions may also include educational information about your condition. If you experience any of warning signs to your health, call your doctor.           [facility]  Unless otherwise directed by your provider, please   contact [facility], our nurse care line   that is available for 24/7 assistance.     [phone] (toll-free)     Registered nurses in the [facility]   provide: appointment scheduling, clinical advisement, health education, and other advisory services.                  ** Verify the list of medication(s) below is accurate and up to date. Carry this with you in case of emergency.  If your medications have changed, please notify your healthcare provider.             Medication List      START taking these medications        Additional Info    Begin Date AM Noon PM Bedtime    aspirin 81 MG EC tablet   Commonly known as:  ECOTRIN   Refills:  0   Dose:  81 mg    Instructions:  Take 1 tablet (81 mg total) by mouth once daily.     Monday 4/10/17                         CONTINUE taking these medications        Additional Info    Begin Date AM Noon PM Bedtime    diltiaZEM 120 MG Cdcr   Commonly known as:  DILACOR XR   Quantity:  30 capsule   Refills:  0   Dose:  120 mg    Instructions:  Take 1 capsule (120 mg total) by mouth once daily.     Monday 4/10/17                         oxycodone-acetaminophen  mg per tablet   Commonly known as:  PERCOCET   Refills:  0   Dose:  1 tablet    Instructions:  Take 1 tablet by mouth every 4 (four) hours as needed for Pain.                                 Where to Get Your Medications      You can get these medications from any pharmacy     Bring a paper prescription for each of these medications     diltiaZEM 120 MG Cdcr         Information about where to get these medications is not yet available     ! Ask your nurse or doctor about these medications     aspirin 81 MG EC tablet                  Please bring to all follow up appointments:    1. A copy of your discharge instructions.  2. All medicines you are currently taking in their original bottles.  3. Identification and insurance card.    Please arrive 15 minutes ahead of scheduled appointment time.    Please call 24 hours in advance if you must reschedule your appointment and/or time.        Follow-up Information     Follow up with Eyal Tay In 2 days.    Contact information:    [address] [address]  [phone]          Follow up with Ramon Rodríguez MD In 1 week.    Specialties:  Cardiology, INTERVENTIONAL CARDIOLOGY    Contact information:    [address]  "  [address]  [place] [address]  [phone]          Discharge Instructions     Future Orders    Activity as tolerated     Diet general     Questions:    Total calories:      Fat restriction, if any:      Protein restriction, if any:      Na restriction, if any:      Fluid restriction:      Additional restrictions:          Discharge Instructions       Prescription given. Education on afib, smoking cessation, and dilitiazem given.     Discharge References/Attachments     KICKING THE SMOKING HABIT (ENGLISH)    SMOKE, WHY DO YOU (ENGLISH)    SMOKING CESSATION (ENGLISH)    SMOKING WITHDRAWAL, COPING WITH (ENGLISH)    SMOKE-FREE, STAYING (ENGLISH)    SMOKE FREE, BENEFITS OF LIVING  (ENGLISH)        Primary Diagnosis     Your primary diagnosis was:  Not on File      Admission Information     Date & Time Provider Department CSN    4/8/2017  1:49 PM Aftab Craig MD [facility] [id number]      Care Providers     Provider Role Specialty Primary office phone    Aftab Craig MD Attending Provider Hospitalist [phone]    Chris Vaughan MD Consulting Physician  Cardiology [phone]      Your Vitals Were     BP Pulse Temp Resp Height Weight    139/81 80 98.6 °F (37 °C) (Oral) 27 5' 6" (1.676 m) 101.9 kg (224 lb 10.4 oz)    SpO2 BMI             99% 36.26 kg/m2         Recent Lab Values     No lab values to display.      Allergies as of 4/9/2017        Reactions    Eggs [Egg Derived] Anaphylaxis    Ativan [Lorazepam]     nausea    Corticosteroids (Glucocorticoids) Palpitations      Advance Directives     An advance directive is a document which, in the event you are no longer able to make decisions for yourself, tells your healthcare team what kind of treatment you do or do not want to receive, or who you would like to make those decisions for you.  If you do not currently have an advance directive, [facility] encourages you to create one.  For more information call:  [phone] " [phone]), [phone] ([phone]),  or log on to [email or web address].        Smoking Cessation     If you would like to quit smoking:   You may be eligible for free services if you are a [place] resident and started smoking cigarettes before September 1, 1988.  Call the [employer]oking [employer]essation [employer] ([employer]) toll free at (333) 525-9570 or (064) 087-4123.   Call 8-800-QUIT-NOW if you do not meet the above criteria.   Contact us via email: [email or web address]   View our website for more information: [email or web address]        Language Assistance Services     ATTENTION: Language assistance services are available, free of charge. Please call [phone].      ATENCIÓN: Si lathala kristin, tiene a ferro disposición servicios gratuitos de asistencia lingüística. Llame al [phone].     CHÚ Ý: N?u b?n nói Ti?ng Vi?t, có các d?ch v? h? tr? ngôn ng? mi?n phí dành cho b?n. G?i s? [phone].        [facility] Sign-Up     Activating your [facility] account is as easy as 1-2-3!     1) Visit [email or web address], select Sign Up Now, enter this activation code and your date of birth, then select Next.  [email or web address]  Expires: 5/24/2017 11:48 AM      2) Create a username and password to use when you visit [facility] in the future and select a security question in case you lose your password and select Next.    3) Enter your e-mail address and click Sign Up!    Additional Information  If you have questions, please e-mail [email or web address] or call [phone] to talk to our [facility] staff. Remember, [facility] is NOT to be used for urgent needs. For medical emergencies, dial 911.          [facility] complies with applicable Federal civil rights laws and does not discriminate on the basis of race, color, national origin, age, disability, or sex.

## 2017-04-08 NOTE — ED PROVIDER NOTES
"Encounter Date: 4/8/2017    SCRIBE #1 NOTE: I, Joseline Alvarez , am scribing for, and in the presence of,  Vishal Eddy MD . I have scribed the following portions of the note - Other sections scribed: HPI and ROS .       History     Chief Complaint   Patient presents with    Anxiety     arrived [facility] called to pt's home for second time, pt reports heart not beating for 20mins, heart fluttering, pt refused EMS transports after calling them the first time, agreed to transports to hospital on second call for smae complaint     Review of patient's allergies indicates:   Allergen Reactions    Eggs [egg derived] Anaphylaxis    Ativan [lorazepam]      nausea    Corticosteroids (glucocorticoids) Palpitations     HPI Comments: CC: Atrial Fibrillation.   History obtained from patient.  Pt arrived via EMS transportation.     HPI: This 56 y.o. Male, who has Anxiety and a history of atrial fibrillation, presents to the ED for evaluation of atrial fibrillation that began shortly prior to arrival in the ED. The patient states, "I could just feel my heart fluttering, like it was out of my chest and just not even beating, just fluttering." He further states, "I know you might think I sound crazy, but I can tell when I'm in A-fib." The patient denies chest pain or SOB. He states he did experience mild dizziness at the time that atrial fibrillation began. He denies fever, chills, ear pain, sore throat, eye pain, cough, abdominal pain, N/V/D, rash, difficulty urinating, myalgias, rash, or headache. He has attempted no treatment prior to arrival in the ED. He states that the current episode is consistent with two previous episodes in 2007 and 2012. The patient states he was on Klonopin for 4 years and has been on a tapered regimen, which ended last week. The patient denies smoking or drinking. He notes that he does drink large amounts of caffeine.     The history is provided by the patient. No  was used. "     Past Medical History:   Diagnosis Date    Anxiety     Atrial fibrillation     Chronic back pain      Past Surgical History:   Procedure Laterality Date    CHOLECYSTECTOMY      HERNIA REPAIR      NASAL SEPTUM SURGERY      RECTAL SURGERY       History reviewed. No pertinent family history.  Social History   Substance Use Topics    Smoking status: Current Every Day Smoker     Packs/day: 0.50     Types: Cigarettes     Last attempt to quit: 12/7/2010    Smokeless tobacco: Never Used    Alcohol use No     Review of Systems   Constitutional: Negative for chills and fever.   HENT: Negative for ear pain and sore throat.    Eyes: Negative for pain.   Respiratory: Negative for cough and shortness of breath.    Cardiovascular: Positive for palpitations. Negative for chest pain.   Gastrointestinal: Negative for abdominal pain, diarrhea, nausea and vomiting.   Genitourinary: Negative for dysuria.   Musculoskeletal: Negative for myalgias (arm or leg pain).   Skin: Negative for rash.   Neurological: Positive for dizziness. Negative for headaches.       Physical Exam   Initial Vitals   BP Pulse Resp Temp SpO2   04/08/17 1351 04/08/17 1351 04/08/17 1351 04/08/17 1351 04/08/17 1351   120/67 102 18 97.8 °F (36.6 °C) 98 %     Physical Exam  The patient was examined specifically for the following:   General:No significant distress, Good color, Warm and dry. Head and neck:Scalp atraumatic, Neck supple. Neurological:Appropriate conversation, Gross motor deficits. Eyes:Conjugate gaze, Clear corneas. ENT: No epistaxis. Cardiac: Regular rate and rhythm, Grossly normal heart tones. Pulmonary: Wheezing, Rales. Gastrointestinal: Abdominal tenderness, Abdominal distention. Musculoskeletal: Extremity deformity, Apparent pain with range of motion of the joints. Skin: Rash.   The findings on examination were normal except for the following: The patient's blood pressures 113/57.  He has a heart rate of 125  to 135 the emergency room.   The patient has an irregularly irregular rhythm.  The lungs are clear and free wheezing rales of the rhonchi.    ED Course   Critical Care  Date/Time: 4/8/2017 4:52 PM  Performed by: JEFFRY BLACKWOOD.  Authorized by: JEFFRY BLACKWOOD   Direct patient critical care time: 25 minutes  Additional history critical care time: 11 minutes  Ordering / reviewing critical care time: 9 minutes  Documentation critical care time: 17 minutes  Consulting other physicians critical care time: 4 minutes  Consult with family critical care time: 3 minutes  Total critical care time (exclusive of procedural time) : 69 minutes  Critical care time was exclusive of separately billable procedures and treating other patients and teaching time.  Critical care was necessary to treat or prevent imminent or life-threatening deterioration of the following conditions: cardiac failure.  Critical care was time spent personally by me on the following activities: development of treatment plan with patient or surrogate, examination of patient, ordering and review of laboratory studies, re-evaluation of patient's condition, pulse oximetry, ordering and performing treatments and interventions, evaluation of patient's response to treatment, discussions with primary provider, review of old charts, ordering and review of radiographic studies and obtaining history from patient or surrogate.        Labs Reviewed   COMPREHENSIVE METABOLIC PANEL - Abnormal; Notable for the following:        Result Value    Glucose 112 (*)     AST 47 (*)     ALT 57 (*)     All other components within normal limits   CBC W/ AUTO DIFFERENTIAL - Abnormal; Notable for the following:     Mono # 1.1 (*)     All other components within normal limits   TSH   B-TYPE NATRIURETIC PEPTIDE   TROPONIN I   PROTIME-INR   APTT     EKG Readings: (Independently Interpreted)   Patient's EKG reveals atrial fibrillation with a rapid ventricular response.  There are no significant ST segment changes.   There are nonspecific T-wave changes.  The patient's heart rate is 122.  There are low voltage QRS complexes.       X-Rays:   Independently Interpreted Readings:   Other Readings:  Chest x-ray fails to reveal pneumothorax pneumonia pleural effusion    Medical decision making: Given the above this region presents with BRANDON macias with RVR.  He was treated with amiodarone in the emergency room.  His heart rate is now ranging between 75 and 95.  He remains in atrial fibrillation.  I discussed this case with , who accepts this patient onto her service and the ICU.  I will consult Dr. Vaughan cardiology.  I we will anticoagulate the patient with Lovenox.  I will have the patient followed by cardiology. I could find no other significant problems at this time.  Laboratory evaluation was essentially unremarkable.  The patient has had atrial fibrillation in the past.  He is recently off of Klonopin.  He believes that this is playing a role in the precipitation of his arrhythmia.                Scribe Attestation:   Scribe #1: I performed the above scribed service and the documentation accurately describes the services I performed. I attest to the accuracy of the note.    Attending Attestation:           Physician Attestation for Scribe:  Physician Attestation Statement for Scribe #1: I, Vishal Eddy MD, reviewed documentation, as scribed by Joseline Alvarez  in my presence, and it is both accurate and complete.                 ED Course     Clinical Impression:   The encounter diagnosis was Atrial fibrillation with rapid ventricular response.          Vishal Eddy MD  04/08/17 5780

## 2017-04-09 VITALS
SYSTOLIC BLOOD PRESSURE: 139 MMHG | TEMPERATURE: 99 F | BODY MASS INDEX: 36.1 KG/M2 | WEIGHT: 224.63 LBS | HEIGHT: 66 IN | HEART RATE: 80 BPM | RESPIRATION RATE: 27 BRPM | OXYGEN SATURATION: 99 % | DIASTOLIC BLOOD PRESSURE: 81 MMHG

## 2017-04-09 LAB
ALBUMIN SERPL BCP-MCNC: 3.6 G/DL
ALP SERPL-CCNC: 51 U/L
ALT SERPL W/O P-5'-P-CCNC: 46 U/L
ANION GAP SERPL CALC-SCNC: 10 MMOL/L
AST SERPL-CCNC: 29 U/L
BASOPHILS # BLD AUTO: 0.05 K/UL
BASOPHILS NFR BLD: 0.4 %
BILIRUB SERPL-MCNC: 0.7 MG/DL
BUN SERPL-MCNC: 13 MG/DL
CALCIUM SERPL-MCNC: 9 MG/DL
CHLORIDE SERPL-SCNC: 107 MMOL/L
CO2 SERPL-SCNC: 24 MMOL/L
CREAT SERPL-MCNC: 0.8 MG/DL
DIASTOLIC DYSFUNCTION: NO
DIFFERENTIAL METHOD: ABNORMAL
EOSINOPHIL # BLD AUTO: 0.1 K/UL
EOSINOPHIL NFR BLD: 0.9 %
ERYTHROCYTE [DISTWIDTH] IN BLOOD BY AUTOMATED COUNT: 13.4 %
EST. GFR  (AFRICAN AMERICAN): >60 ML/MIN/1.73 M^2
EST. GFR  (NON AFRICAN AMERICAN): >60 ML/MIN/1.73 M^2
ESTIMATED PA SYSTOLIC PRESSURE: 28.43
GLOBAL PERICARDIAL EFFUSION: NORMAL
GLUCOSE SERPL-MCNC: 109 MG/DL
HCT VFR BLD AUTO: 43.4 %
HGB BLD-MCNC: 15.1 G/DL
LYMPHOCYTES # BLD AUTO: 4.3 K/UL
LYMPHOCYTES NFR BLD: 33.5 %
MAGNESIUM SERPL-MCNC: 2.1 MG/DL
MCH RBC QN AUTO: 30 PG
MCHC RBC AUTO-ENTMCNC: 34.8 %
MCV RBC AUTO: 86 FL
MITRAL VALVE MOBILITY: NORMAL
MITRAL VALVE REGURGITATION: NORMAL
MONOCYTES # BLD AUTO: 1.1 K/UL
MONOCYTES NFR BLD: 8.5 %
NEUTROPHILS # BLD AUTO: 7.3 K/UL
NEUTROPHILS NFR BLD: 56.5 %
PHOSPHATE SERPL-MCNC: 3 MG/DL
PLATELET # BLD AUTO: 293 K/UL
PMV BLD AUTO: 10 FL
POTASSIUM SERPL-SCNC: 3.5 MMOL/L
PROT SERPL-MCNC: 6.3 G/DL
RBC # BLD AUTO: 5.03 M/UL
RETIRED EF AND QEF - SEE NOTES: 55 (ref 55–65)
SODIUM SERPL-SCNC: 141 MMOL/L
TRICUSPID VALVE REGURGITATION: NORMAL
TROPONIN I SERPL DL<=0.01 NG/ML-MCNC: 0.02 NG/ML
WBC # BLD AUTO: 12.93 K/UL

## 2017-04-09 PROCEDURE — 63600175 PHARM REV CODE 636 W HCPCS: Performed by: EMERGENCY MEDICINE

## 2017-04-09 PROCEDURE — 85025 COMPLETE CBC W/AUTO DIFF WBC: CPT

## 2017-04-09 PROCEDURE — 84100 ASSAY OF PHOSPHORUS: CPT

## 2017-04-09 PROCEDURE — 80053 COMPREHEN METABOLIC PANEL: CPT

## 2017-04-09 PROCEDURE — 93306 TTE W/DOPPLER COMPLETE: CPT

## 2017-04-09 PROCEDURE — 83735 ASSAY OF MAGNESIUM: CPT

## 2017-04-09 PROCEDURE — 25000003 PHARM REV CODE 250: Performed by: INTERNAL MEDICINE

## 2017-04-09 PROCEDURE — 36415 COLL VENOUS BLD VENIPUNCTURE: CPT

## 2017-04-09 PROCEDURE — 84484 ASSAY OF TROPONIN QUANT: CPT

## 2017-04-09 PROCEDURE — 93306 TTE W/DOPPLER COMPLETE: CPT | Mod: 26,,, | Performed by: INTERNAL MEDICINE

## 2017-04-09 PROCEDURE — 99232 SBSQ HOSP IP/OBS MODERATE 35: CPT | Mod: ,,, | Performed by: INTERNAL MEDICINE

## 2017-04-09 RX ORDER — DILTIAZEM HYDROCHLORIDE 120 MG/1
120 CAPSULE, EXTENDED RELEASE ORAL DAILY
Qty: 30 CAPSULE | Refills: 0 | Status: SHIPPED | OUTPATIENT
Start: 2017-04-09 | End: 2017-05-24

## 2017-04-09 RX ORDER — ASPIRIN 81 MG/1
81 TABLET ORAL DAILY
Status: DISCONTINUED | OUTPATIENT
Start: 2017-04-09 | End: 2017-04-09 | Stop reason: HOSPADM

## 2017-04-09 RX ORDER — ASPIRIN 81 MG/1
81 TABLET ORAL DAILY
Refills: 0
Start: 2017-04-09 | End: 2018-01-26

## 2017-04-09 RX ORDER — DILTIAZEM HYDROCHLORIDE 120 MG/1
120 CAPSULE, COATED, EXTENDED RELEASE ORAL DAILY
Status: DISCONTINUED | OUTPATIENT
Start: 2017-04-09 | End: 2017-04-09 | Stop reason: HOSPADM

## 2017-04-09 RX ADMIN — ENOXAPARIN SODIUM 100 MG: 100 INJECTION SUBCUTANEOUS at 08:04

## 2017-04-09 RX ADMIN — AMIODARONE HYDROCHLORIDE 0.5 MG/MIN: 1.8 INJECTION, SOLUTION INTRAVENOUS at 06:04

## 2017-04-09 RX ADMIN — NICOTINE 1 PATCH: 21 PATCH, EXTENDED RELEASE TRANSDERMAL at 08:04

## 2017-04-09 NOTE — NURSING
Pt given education on Afib, smoking cessation, and dilitiazem. Belongings with patient. Prescription given to patient. Pt agitated and wanted to leave. Pt would not listen to discharge instructions. Pt reinforced on the need to take medications and follow up with doctor. Pt refused wheelchair and ambulated self to taxi.

## 2017-04-09 NOTE — PLAN OF CARE
04/09/17 1129   Final Note   Assessment Type Final Discharge Note   Discharge Disposition Home   Discharge planning education complete? Yes   What phone number can be called within the next 1-3 days to see how you are doing after discharge? ([phone] )   Hospital Follow Up  Appt(s) scheduled? No  (Unable to schedule)   Discharge plans and expectations educations in teach back method with documentation complete? Yes   Offered [facility] -- Bedside Delivery? n/a   Discharge/Hospital Encounter Summary [facility] PCP n/a   Referral to Outpatient Case Management complete? n/a   Referral to / orders for Home Health Complete? n/a   30 day supply of medicines given at discharge, if documented non-compliance / non-adherence? n/a   Any social issues identified prior to discharge? No   Did you assess the readiness or willingness of the family or caregiver to support self management of care? Yes

## 2017-04-09 NOTE — SUBJECTIVE & OBJECTIVE
Past Medical History:   Diagnosis Date    Anxiety     Atrial fibrillation     Chronic back pain        Past Surgical History:   Procedure Laterality Date    CHOLECYSTECTOMY      HERNIA REPAIR      NASAL SEPTUM SURGERY      RECTAL SURGERY         Review of patient's allergies indicates:   Allergen Reactions    Eggs [egg derived] Anaphylaxis    Ativan [lorazepam]      nausea    Corticosteroids (glucocorticoids) Palpitations       No current facility-administered medications on file prior to encounter.      Current Outpatient Prescriptions on File Prior to Encounter   Medication Sig    oxycodone-acetaminophen (PERCOCET)  mg per tablet Take 1 tablet by mouth every 4 (four) hours as needed for Pain.    diltiaZEM (DILACOR XR) 120 MG CDCR Take 1 capsule (120 mg total) by mouth once daily.    [DISCONTINUED] clonazepam (KLONOPIN) 2 MG Tab Take 2 mg by mouth 2 (two) times daily.       Family History     None        Social History Main Topics    Smoking status: Current Every Day Smoker     Packs/day: 0.50     Types: Cigarettes     Last attempt to quit: 12/7/2010    Smokeless tobacco: Never Used    Alcohol use No    Drug use: No    Sexual activity: No     Review of Systems   Constitutional: Negative for activity change, appetite change, chills, diaphoresis, fatigue, fever and unexpected weight change.   HENT: Negative.    Eyes: Negative.    Respiratory: Negative for cough, chest tightness, shortness of breath and wheezing.    Cardiovascular: Positive for palpitations. Negative for chest pain and leg swelling.   Gastrointestinal: Negative for abdominal distention, abdominal pain, blood in stool, constipation, diarrhea, nausea and vomiting.   Endocrine: Negative.    Genitourinary: Negative for dysuria and hematuria.   Musculoskeletal: Negative.    Neurological: Negative for dizziness, seizures, syncope, weakness and light-headedness.   Psychiatric/Behavioral:        Anxiety     Objective:     Vital Signs  (Most Recent):  Temp: 98.2 °F (36.8 °C) (04/08/17 2000)  Pulse: 89 (04/08/17 2200)  Resp: 16 (04/08/17 2200)  BP: 122/82 (04/08/17 2200)  SpO2: 95 % (04/08/17 2200) Vital Signs (24h Range):  Temp:  [97.8 °F (36.6 °C)-98.6 °F (37 °C)] 98.2 °F (36.8 °C)  Pulse:  [] 89  Resp:  [15-75] 16  SpO2:  [94 %-99 %] 95 %  BP: (100-148)/(60-91) 122/82     Weight: 101.9 kg (224 lb 10.4 oz)  Body mass index is 36.26 kg/(m^2).    Physical Exam   Constitutional: He is oriented to person, place, and time. He appears well-developed and well-nourished. No distress.   HENT:   Head: Normocephalic and atraumatic.   Right Ear: External ear normal.   Left Ear: External ear normal.   Nose: Nose normal.   Eyes: Right eye exhibits no discharge.   Neck: Normal range of motion.   Cardiovascular:   Normal rate but with irregular irregular rhythm, positive for S4   Pulmonary/Chest: Effort normal and breath sounds normal. No respiratory distress. He has no wheezes. He has no rales. He exhibits no tenderness.   Abdominal: Soft. Bowel sounds are normal. He exhibits no distension. There is no tenderness. There is no rebound and no guarding.   Musculoskeletal: Normal range of motion. He exhibits no edema.   Neurological: He is alert and oriented to person, place, and time.   Skin: Skin is warm and dry. He is not diaphoretic. No erythema.   Psychiatric: Thought content normal. His mood appears anxious. His speech is rapid and/or pressured. He is hyperactive. Cognition and memory are normal. He expresses impulsivity.   Nursing note and vitals reviewed.       Significant Labs: All pertinent labs within the past 24 hours have been reviewed.    Significant Imaging: I have reviewed and interpreted all pertinent imaging results/findings within the past 24 hours.

## 2017-04-09 NOTE — DISCHARGE SUMMARY
"[facility] Hospital Medicine  Discharge Summary      Patient Name: Timmy Wade  MRN: [id number]  Admission Date: 4/8/2017  Hospital Length of Stay: 1 days  Discharge Date and Time:  04/09/2017 11:19 AM  Attending Physician: Aftab Craig MD   Discharging Provider: Aftab rCaig MD  Primary Care Provider: Eyal Tay      HPI:   Mr. Timmy Wade is a 56 y.o. male with paroxysmal atrial fibrillation (CHADS2 score 0) not on chronic anticoagulation, obesity (BMI 36.3), and tobacco abuse who presents to [facility] ED with complaints of palpitations today.  He had awoken with the sensation and says that it lasted about 20 minutes.  He reports that sensation as a "fluttering" and that his heart was not beating for the entirety of the episode.  He activated EMS and his episode had resolved by their arrival; he declined ED evaluation.  When the episode recurred in the afternoon, he decided to come to the ED.  He says that he gets these rarely but that it would be momentary.  He denies any associated chest pain, diaphoresis, nausea, vomiting, shortness of breath, nor any hemoptysis.  He strongly feels that this is due to clonazepam withdrawal.  He had an episode of this when his PCP tried to wean him off of clonazepam that he takes for anxiety, and after 8 days he had atrial fibrillation.  This time he's been off for about two weeks as his PCP again has been trying to wean him from it.      * No surgery found *      Indwelling Lines/Drains at time of discharge:   Lines/Drains/Airways          No matching active lines, drains, or airways        Hospital Course:   Mr. Timmy Wade is a 56 y.o. male with paroxysmal atrial fibrillation (CHADS2 score 0) not on chronic anticoagulation, obesity (BMI 36.3), and tobacco abuse who presents to [facility] ED with complaints of palpitations today.  He had awoken with the sensation and says that it lasted about 20 " "minutes.  He reports that sensation as a "fluttering" and that his heart was not beating for the entirety of the episode.  He activated EMS and his episode had resolved by their arrival; he declined ED evaluation.  When the episode recurred in the afternoon, he decided to come to the ED.  He says that he gets these rarely but that it would be momentary.  He denies any associated chest pain, diaphoresis, nausea, vomiting, shortness of breath, nor any hemoptysis.  He strongly feels that this is due to clonazepam withdrawal.but he is not taking Clonazepam about 8 days,  He had an episode of this when his PCP tried to wean him off of clonazepam that he takes for anxiety, and after 8 days he had atrial fibrillation.  This time he's been off for about two weeks as his PCP again has been trying to wean him from it.patient was in Afib with RVR,he has been started on amiodarone drip ,send to ICU,and cardiology has been consulted,he converted back to sinus,he admit drinking a lot of coffee,has been instructed avoid drinking large amount of coffee.he had CHADDS sore of 0,he did not required OAC,he has been discharged home with Diltiazem and follow up with PCP and cardiology in next few days.    Patient had improvement faster than expected,so he has been discharged home.  Consults:   Consults         Status Ordering Provider     Inpatient consult to Cardiology  Once     Provider:  Chris Vaughan MD    Completed JEFFRY BLACKWOOD          Significant Diagnostic Studies: Labs:   BMP:   Recent Labs  Lab 04/08/17  1430 04/09/17  0214   * 109    141   K 4.3 3.5    107   CO2 24 24   BUN 16 13   CREATININE 1.0 0.8   CALCIUM 9.5 9.0   MG  --  2.1   , CBC   Recent Labs  Lab 04/08/17  1430 04/09/17  0214   WBC 11.65 12.93*   HGB 15.9 15.1   HCT 46.8 43.4    293    and Troponin   Recent Labs  Lab 04/09/17  0214   TROPONINI 0.022     Radiology: X-Ray: CXR: X-Ray Chest 1 View (CXR): No results found for this visit " on 04/08/17.    Pending Diagnostic Studies:     Procedure Component Value Units Date/Time    2D echo with color flow doppler [[id number]]     Order Status:  Sent Lab Status:  No result         Final Active Diagnoses:    Diagnosis Date Noted POA    PRINCIPAL PROBLEM:  Atrial fibrillation with rapid ventricular response [I48.91] 12/07/2012 Yes    Paroxysmal atrial fibrillation [I48.0] 04/08/2017 Yes     Chronic    Obesity (BMI 35.0-39.9) [E66.9] 04/08/2017 Yes     Chronic    Tobacco abuse [Z72.0] 04/08/2017 Yes     Chronic      Problems Resolved During this Admission:    Diagnosis Date Noted Date Resolved POA      No new Assessment & Plan notes have been filed under this hospital service since the last note was generated.  Service: Hospital Medicine      Discharged Condition: stable    Disposition: Home or Self Care    Follow Up:  Follow-up Information     Follow up with Eyal Tay In 2 days.    Contact information:    [address] [place] [place] [address] [phone]          Follow up with Ramon Rodríguez MD In 1 week.    Specialties:  Cardiology, INTERVENTIONAL CARDIOLOGY    Contact information:    [address]  [place] [place] [address] [phone]          Patient Instructions:     Diet general     Activity as tolerated       Medications:  Reconciled Home Medications:   Current Discharge Medication List      START taking these medications    Details   aspirin (ECOTRIN) 81 MG EC tablet Take 1 tablet (81 mg total) by mouth once daily.  Refills: 0         CONTINUE these medications which have CHANGED    Details   diltiaZEM (DILACOR XR) 120 MG CDCR Take 1 capsule (120 mg total) by mouth once daily.  Qty: 30 capsule, Refills: 0         CONTINUE these medications which have NOT CHANGED    Details   oxycodone-acetaminophen (PERCOCET)  mg per tablet Take 1 tablet by mouth every 4 (four) hours as needed for Pain.           Time spent on the discharge of patient: 30   minutes    Aftab Craig MD  Department of Hospital Medicine  [facility]

## 2017-04-09 NOTE — H&P
"[facility] Hospital Medicine  History & Physical    Patient Name: Timmy Wade  MRN: [id number]  Admission Date: 4/8/2017  Attending Physician: Sheeba Cobos MD   Primary Care Provider: Eyal Tay         Patient information was obtained from patient.     Subjective:     Principal Problem:Atrial fibrillation with rapid ventricular response    Chief Complaint: Palpitations today.    HPI: Mr. Timmy Wade is a 56 y.o. male with paroxysmal atrial fibrillation (CHADS2 score 0) not on chronic anticoagulation, obesity (BMI 36.3), and tobacco abuse who presents to [facility] ED with complaints of palpitations today.  He had awoken with the sensation and says that it lasted about 20 minutes.  He reports that sensation as a "fluttering" and that his heart was not beating for the entirety of the episode.  He activated EMS and his episode had resolved by their arrival; he declined ED evaluation.  When the episode recurred in the afternoon, he decided to come to the ED.  He says that he gets these rarely but that it would be momentary.  He denies any associated chest pain, diaphoresis, nausea, vomiting, shortness of breath, nor any hemoptysis.  He strongly feels that this is due to clonazepam withdrawal.  He had an episode of this when his PCP tried to wean him off of clonazepam that he takes for anxiety, and after 8 days he had atrial fibrillation.  This time he's been off for about two weeks as his PCP again has been trying to wean him from it.      Chart Review:  Previous Hospitalizations  Date Hospital Diagnosis   Dec 2012 [facility] Atrial fibrillation with rapid ventricular response     Past Medical History:   Diagnosis Date    Anxiety     Atrial fibrillation     Chronic back pain        Past Surgical History:   Procedure Laterality Date    CHOLECYSTECTOMY      HERNIA REPAIR      NASAL SEPTUM SURGERY      RECTAL SURGERY         Review of patient's allergies indicates: "   Allergen Reactions    Eggs [egg derived] Anaphylaxis    Ativan [lorazepam]      nausea    Corticosteroids (glucocorticoids) Palpitations       No current facility-administered medications on file prior to encounter.      Current Outpatient Prescriptions on File Prior to Encounter   Medication Sig    oxycodone-acetaminophen (PERCOCET)  mg per tablet Take 1 tablet by mouth every 4 (four) hours as needed for Pain.    diltiaZEM (DILACOR XR) 120 MG CDCR Take 1 capsule (120 mg total) by mouth once daily.    [DISCONTINUED] clonazepam (KLONOPIN) 2 MG Tab Take 2 mg by mouth 2 (two) times daily.       Family History     None        Social History Main Topics    Smoking status: Current Every Day Smoker     Packs/day: 0.50     Types: Cigarettes     Last attempt to quit: 12/7/2010    Smokeless tobacco: Never Used    Alcohol use No    Drug use: No    Sexual activity: No     Review of Systems   Constitutional: Negative for activity change, appetite change, chills, diaphoresis, fatigue, fever and unexpected weight change.   HENT: Negative.    Eyes: Negative.    Respiratory: Negative for cough, chest tightness, shortness of breath and wheezing.    Cardiovascular: Positive for palpitations. Negative for chest pain and leg swelling.   Gastrointestinal: Negative for abdominal distention, abdominal pain, blood in stool, constipation, diarrhea, nausea and vomiting.   Endocrine: Negative.    Genitourinary: Negative for dysuria and hematuria.   Musculoskeletal: Negative.    Neurological: Negative for dizziness, seizures, syncope, weakness and light-headedness.   Psychiatric/Behavioral:        Anxiety     Objective:     Vital Signs (Most Recent):  Temp: 98.2 °F (36.8 °C) (04/08/17 2000)  Pulse: 89 (04/08/17 2200)  Resp: 16 (04/08/17 2200)  BP: 122/82 (04/08/17 2200)  SpO2: 95 % (04/08/17 2200) Vital Signs (24h Range):  Temp:  [97.8 °F (36.6 °C)-98.6 °F (37 °C)] 98.2 °F (36.8 °C)  Pulse:  [] 89  Resp:  [15-75]  16  SpO2:  [94 %-99 %] 95 %  BP: (100-148)/(60-91) 122/82     Weight: 101.9 kg (224 lb 10.4 oz)  Body mass index is 36.26 kg/(m^2).    Physical Exam   Constitutional: He is oriented to person, place, and time. He appears well-developed and well-nourished. No distress.   HENT:   Head: Normocephalic and atraumatic.   Right Ear: External ear normal.   Left Ear: External ear normal.   Nose: Nose normal.   Eyes: Right eye exhibits no discharge.   Neck: Normal range of motion.   Cardiovascular:   Normal rate but with irregular irregular rhythm, positive for S4   Pulmonary/Chest: Effort normal and breath sounds normal. No respiratory distress. He has no wheezes. He has no rales. He exhibits no tenderness.   Abdominal: Soft. Bowel sounds are normal. He exhibits no distension. There is no tenderness. There is no rebound and no guarding.   Musculoskeletal: Normal range of motion. He exhibits no edema.   Neurological: He is alert and oriented to person, place, and time.   Skin: Skin is warm and dry. He is not diaphoretic. No erythema.   Psychiatric: Thought content normal. His mood appears anxious. His speech is rapid and/or pressured. He is hyperactive. Cognition and memory are normal. He expresses impulsivity.   Nursing note and vitals reviewed.       Significant Labs: All pertinent labs within the past 24 hours have been reviewed.    Significant Imaging: I have reviewed and interpreted all pertinent imaging results/findings within the past 24 hours.    Assessment/Plan:     * Atrial fibrillation with rapid ventricular response  He was noted to be in atrial fibrillation with rapid ventricular response upon arrival to the ED.  He had a previous admission here in Dec 2012.  His heart rate on EKG was averaging 122 BPMs without any ischemic findings.  He was given a dose of amiodarone with good response and was started on an amiodarone infusion.  He was also started on full-dose enoxaparin.  Cardiac markers are negative.  Will  obtain serial cardiac markers; obtain a 2D-echo in the morning; and consult Cardiology for further recommendations.    Paroxysmal atrial fibrillation  As addressed above.    Obesity (BMI 35.0-39.9)  The patient has been counseled on the negative impact that obesity imparts on his health, and was encouraged to make lifestyle changes in order to lose weight and decrease his modifiable risk factors.    Tobacco abuse  Patient was counseled on smoking cessation and he will be provided a nicotine transdermal patch applied while inpatient.  Will provide additional smoking cessation counseling prior to discharge.    VTE Risk Mitigation         Ordered     Medium Risk of VTE  Once      04/08/17 6020            Critical Care Time: 60 minutes.        Georgiana Spence M.D.  [facility]  Department of Hospital Medicine  [facility]  Pager: [phone]

## 2017-04-09 NOTE — ASSESSMENT & PLAN NOTE
CHADSVASC=0  Currently in SR, asymptomatic.  Pt reports infrequent episodes, all apparently in relation to withdrawal from klonopin, dating back to 2005.  Stop enox, amio gtt  Check echo  Restart diltiazem  Quit smoking  Assuming no significant abnormalities on echo, OK for discharge  Pt to follow up with me in the office 1-2 weeks  Will consider EPS referral for ablation.

## 2017-04-09 NOTE — PLAN OF CARE
04/09/17 1141   Discharge Assessment   Confirmed/corrected address and phone number on facesheet? Yes   Assessment information obtained from? Patient   Expected Length of Stay (days) [phone]   Prior to hospitilization cognitive status: Alert/Oriented   Prior to hospitalization functional status: Independent   Current cognitive status: Alert/Oriented   Current Functional Status: Independent   Arrived From admitted as an inpatient   Lives With parent(s)   Able to Return to Prior Arrangements yes   Is patient able to care for self after discharge? Yes   How many people do you have in your home that can help with your care after discharge? 1   Who are your caregiver(s) and their phone number(s)? aPtti Eastman [phone] - Mother   Patient's perception of discharge disposition admitted as an inpatient;home or selfcare   Readmission Within The Last 30 Days no previous admission in last 30 days   Patient currently being followed by outpatient case management? No   Patient currently receives home health services? No   Does the patient currently use HME? No   Patient currently receives private duty nursing? No   Patient currently receives any other outside agency services? No   Equipment Currently Used at Home none   Do you have any problems affording any of your prescribed medications? No   Is the patient taking medications as prescribed? yes   Do you have any financial concerns preventing you from receiving the healthcare you need? No   Does the patient have transportation to healthcare appointments? Yes   Transportation Available car;family or friend will provide   On Dialysis? No   Does the patient receive services at the Coumadin Clinic? No   Are there any open cases? No   Discharge Plan A Home with family   Patient/Family In Agreement With Plan yes

## 2017-04-09 NOTE — ASSESSMENT & PLAN NOTE
The patient has been counseled on the negative impact that obesity imparts on his health, and was encouraged to make lifestyle changes in order to lose weight and decrease his modifiable risk factors.

## 2017-04-09 NOTE — MEDICAL/APP STUDENT
[facility] Hospital Medicine  History & Physical    Patient Name: Timmy Wade  MRN: [id number]  Admission Date: 4/8/2017  Attending Physician: Sheeba Cobos MD   Primary Care Provider: Eyal Tay         Patient information was obtained from patient and ER records.     Subjective:     Principal Problem:<principal problem not specified>    Chief Complaint:   Chief Complaint   Patient presents with    Anxiety     arrived [facility] called to pt's home for second time, pt reports heart not beating for 20mins, heart fluttering, pt refused EMS transports after calling them the first time, agreed to transports to hospital on second call for smae complaint        HPI:  Mr. Wade is a 56 y.o male with anxiety and chronic back paink c/o fluttering heart beat. Patient reports having 2 similar episodes in 2007 and 2012. This episode began this morning, shortly prior to arrival in ED. He is convinced that this is a reaction caused by him weaning off of Klonopin. Patient reports being on klonopin for past 4 years and has been on a tapered regimen which ended last week.  Patient denies chest pain, syncope shortness of breath, dizziness, and light-headedness. Patients denies fevers, chills, weakness, reduced exercise capacity, and increased urination. Patient denies dysphoria or any recent mood changes but does report panic attacks since being off the klonopin.  Patient denies tremors, perceptual disturbances, dysphoria, psychosis, and seizures.     Past Medical History:   Diagnosis Date    Anxiety     Atrial fibrillation     Chronic back pain        Past Surgical History:   Procedure Laterality Date    CHOLECYSTECTOMY      HERNIA REPAIR      NASAL SEPTUM SURGERY      RECTAL SURGERY         Review of patient's allergies indicates:   Allergen Reactions    Eggs [egg derived] Anaphylaxis    Ativan [lorazepam]      nausea    Corticosteroids (glucocorticoids) Palpitations        No current facility-administered medications on file prior to encounter.      Current Outpatient Prescriptions on File Prior to Encounter   Medication Sig    oxycodone-acetaminophen (PERCOCET)  mg per tablet Take 1 tablet by mouth every 4 (four) hours as needed for Pain.    diltiaZEM (DILACOR XR) 120 MG CDCR Take 1 capsule (120 mg total) by mouth once daily.    [DISCONTINUED] clonazepam (KLONOPIN) 2 MG Tab Take 2 mg by mouth 2 (two) times daily.       Family History     None        Social History Main Topics    Smoking status: Current Every Day Smoker     Packs/day: 0.50     Types: Cigarettes     Last attempt to quit: 12/7/2010    Smokeless tobacco: Never Used    Alcohol use No    Drug use: No    Sexual activity: No     Review of Systems   Constitutional: Negative for chills and fever.   HENT: Negative.    Eyes: Negative.    Respiratory: Negative for chest tightness and shortness of breath.    Cardiovascular: Negative for chest pain and palpitations.   Gastrointestinal: Negative for abdominal pain, nausea and vomiting.   Endocrine: Negative.    Genitourinary: Negative.    Musculoskeletal: Positive for back pain.   Skin: Negative.    Allergic/Immunologic: Negative.    Neurological: Negative for dizziness, seizures, syncope, weakness, light-headedness and headaches.   Hematological: Negative.    Psychiatric/Behavioral: Negative for dysphoric mood. The patient is nervous/anxious.      Objective:     Vital Signs (Most Recent):  Temp: 98.2 °F (36.8 °C) (04/08/17 2000)  Pulse: 92 (04/08/17 2100)  Resp: 15 (04/08/17 2100)  BP: 129/81 (04/08/17 2100)  SpO2: 96 % (04/08/17 2100) Vital Signs (24h Range):  Temp:  [97.8 °F (36.6 °C)-98.6 °F (37 °C)] 98.2 °F (36.8 °C)  Pulse:  [] 92  Resp:  [15-75] 15  SpO2:  [94 %-99 %] 96 %  BP: (100-148)/(60-91) 129/81     Weight: 101.9 kg (224 lb 10.4 oz)  Body mass index is 36.26 kg/(m^2).    Physical Exam   Constitutional: He is oriented to person, place, and  time. He appears well-developed and well-nourished.   HENT:   Head: Normocephalic and atraumatic.   Eyes: EOM are normal. Pupils are equal, round, and reactive to light.   Neck: Normal range of motion.   Cardiovascular: S1 normal and S2 normal.  An irregularly irregular rhythm present.   Pulmonary/Chest: Effort normal and breath sounds normal.   Abdominal: Soft. Bowel sounds are normal.   Neurological: He is alert and oriented to person, place, and time.   Skin: Skin is warm and dry.   Psychiatric: His mood appears anxious.       Significant Labs:   Bilirubin:   Recent Labs  Lab 04/08/17  1430   BILITOT 0.6     CBC:   Recent Labs  Lab 04/08/17  1430   WBC 11.65   HGB 15.9   HCT 46.8        CMP:   Recent Labs  Lab 04/08/17  1430      K 4.3      CO2 24   *   BUN 16   CREATININE 1.0   CALCIUM 9.5   PROT 7.1   ALBUMIN 3.9   BILITOT 0.6   ALKPHOS 57   AST 47*   ALT 57*   ANIONGAP 9   EGFRNONAA >60     Cardiac Markers:   Recent Labs  Lab 04/08/17  1430   BNP 25       Significant Imaging:     EKG:   I have reviewed the EKG in the chart and agree with findings of atrial fibrillation with rapid ventricular depolarizations.     Chest X-ray:  Normal findings       Assessment/Plan:   Mr. Wade is a 56 y.o male with anxiety and chronic back paink c/o fluttering heart beat admitted for anxiety and atrial fibrillation.     Benzodiazepam withdrawal with anxiety  -not severe withdrawal symptoms, will not treat unless symptoms become debilitating     Atrial fibrillation:  -ECG findings and clinical examination findings in accordance with atrial fibrillation  -IV Amiodarone to treat A. Fibrillation      Active Diagnoses:    Diagnosis Date Noted POA    Atrial fibrillation with rapid ventricular response [I48.91] 12/07/2012 Yes      Problems Resolved During this Admission:    Diagnosis Date Noted Date Resolved POA     VTE Risk Mitigation         Ordered     Medium Risk of VTE  Once      04/08/17 1957         Shruti Byers  Department of Hospital Medicine   [facility]

## 2017-04-09 NOTE — PLAN OF CARE
04/09/17 1146   Discharge Assessment   Expected Length of Stay (days) [phone]   Communicated expected length of stay with patient/caregiver yes

## 2017-04-09 NOTE — ASSESSMENT & PLAN NOTE
He was noted to be in atrial fibrillation with rapid ventricular response upon arrival to the ED.  He had a previous admission here in Dec 2012.  His heart rate on EKG was averaging 122 BPMs without any ischemic findings.  He was given a dose of amiodarone with good response and was started on an amiodarone infusion.  He was also started on full-dose enoxaparin.  Cardiac markers are negative.  Will obtain serial cardiac markers; obtain a 2D-echo in the morning; and consult Cardiology for further recommendations.

## 2017-04-09 NOTE — CONSULTS
"[facility]  Cardiology  Consult Note    Patient Name: Timmy Wade  MRN: [id number]  Admission Date: 4/8/2017  Hospital Length of Stay: 1 days  Code Status: Full Code   Attending Provider: Aftab Craig MD   Consulting Provider: Ramon Garcia MD  Primary Care Physician: Eyal Tay  Principal Problem:Atrial fibrillation with rapid ventricular response    Patient information was obtained from patient and ER records.     Inpatient consult to Cardiology  Consult performed by: RAMON GARCIA  Consult ordered by: ELIESER LOVE  Reason for consult: AF/RVR        Subjective:     Chief Complaint:  AF/RVR     HPI:   56 y.o. male with paroxysmal atrial fibrillation (CHADS2 score 0) not on chronic anticoagulation, obesity (BMI 36.3), and tobacco abuse who presents to [facility] ED with complaints of palpitations today.  He had awoken with the sensation and says that it lasted about 20 minutes.  He reports that sensation as a "fluttering" and that his heart was not beating for the entirety of the episode.  He activated EMS and his episode had resolved by their arrival; he declined ED evaluation.  When the episode recurred in the afternoon, he decided to come to the ED.  He says that he gets these rarely but that it would be momentary.  He denies any associated chest pain, diaphoresis, nausea, vomiting, shortness of breath, nor any hemoptysis.  He strongly feels that this is due to clonazepam withdrawal.  He had an episode of this when his PCP tried to wean him off of clonazepam that he takes for anxiety, and after 8 days he had atrial fibrillation.  This time he's been off for about two weeks as his PCP again has been trying to wean him from it.      On my arrival, pt in SR.  He describes palps precipitating admission.  He states that he thinks his AF (which has occurred in the past) is related to klonopin.  He denies cp/sob/CVA/TIA/HTN/DM.  He was previously prescribed " diltiazem, but has not picked it up.      Past Medical History:   Diagnosis Date    Anxiety     Atrial fibrillation     Chronic back pain        Past Surgical History:   Procedure Laterality Date    CHOLECYSTECTOMY      HERNIA REPAIR      NASAL SEPTUM SURGERY      RECTAL SURGERY         Review of patient's allergies indicates:   Allergen Reactions    Eggs [egg derived] Anaphylaxis    Ativan [lorazepam]      nausea    Corticosteroids (glucocorticoids) Palpitations       No current facility-administered medications on file prior to encounter.      Current Outpatient Prescriptions on File Prior to Encounter   Medication Sig    oxycodone-acetaminophen (PERCOCET)  mg per tablet Take 1 tablet by mouth every 4 (four) hours as needed for Pain.    diltiaZEM (DILACOR XR) 120 MG CDCR Take 1 capsule (120 mg total) by mouth once daily.     Family History     None        Social History Main Topics    Smoking status: Current Every Day Smoker     Packs/day: 0.50     Types: Cigarettes     Last attempt to quit: 12/7/2010    Smokeless tobacco: Never Used    Alcohol use No    Drug use: No    Sexual activity: No     Review of Systems   Constitution: Negative for chills, diaphoresis, fever, weakness and malaise/fatigue.   HENT: Negative for headaches and nosebleeds.    Eyes: Negative for blurred vision and double vision.   Cardiovascular: Positive for irregular heartbeat and palpitations. Negative for chest pain, claudication, cyanosis, dyspnea on exertion, leg swelling, orthopnea, paroxysmal nocturnal dyspnea and syncope.   Respiratory: Negative for cough, shortness of breath and wheezing.    Skin: Negative for dry skin and poor wound healing.   Musculoskeletal: Negative for back pain, joint swelling and myalgias.   Gastrointestinal: Negative for abdominal pain, nausea and vomiting.   Genitourinary: Negative for hematuria.   Neurological: Negative for dizziness, numbness and seizures.   Psychiatric/Behavioral:  Negative for altered mental status and depression.     Objective:     Vital Signs (Most Recent):  Temp: 98.6 °F (37 °C) (04/09/17 0700)  Pulse: 77 (04/09/17 1000)  Resp: 19 (04/09/17 1000)  BP: (!) 151/79 (04/09/17 1000)  SpO2: (!) 94 % (04/09/17 1000) Vital Signs (24h Range):  Temp:  [97.8 °F (36.6 °C)-98.6 °F (37 °C)] 98.6 °F (37 °C)  Pulse:  [] 77  Resp:  [6-75] 19  SpO2:  [90 %-99 %] 94 %  BP: (100-151)/(60-93) 151/79     Weight: 101.9 kg (224 lb 10.4 oz)  Body mass index is 36.26 kg/(m^2).    SpO2: (!) 94 %  O2 Device (Oxygen Therapy): room air      Intake/Output Summary (Last 24 hours) at 04/09/17 1032  Last data filed at 04/09/17 1000   Gross per 24 hour   Intake          1015.52 ml   Output             2050 ml   Net         -1034.48 ml       Lines/Drains/Airways     Peripheral Intravenous Line                 Peripheral IV - Single Lumen 04/08/17 1430 Left Antecubital less than 1 day         Peripheral IV - Single Lumen 04/08/17 1435 Right Antecubital less than 1 day                Physical Exam   Constitutional: He is oriented to person, place, and time. He appears well-developed and well-nourished.   HENT:   Head: Normocephalic and atraumatic.   Eyes: Conjunctivae and EOM are normal. Pupils are equal, round, and reactive to light. No scleral icterus.   Neck: Normal range of motion. Neck supple. No JVD present. Carotid bruit is not present. No tracheal deviation present. No thyromegaly present.   Cardiovascular: Normal rate, regular rhythm, S1 normal and S2 normal.  Exam reveals no gallop and no friction rub.    No murmur heard.  Pulmonary/Chest: Effort normal and breath sounds normal. He has no wheezes. He exhibits no tenderness.   Abdominal: Soft. Bowel sounds are normal. He exhibits no distension and no mass. There is no tenderness. There is no rebound and no guarding.   Musculoskeletal: He exhibits no edema.   Neurological: He is alert and oriented to person, place, and time. He has normal  strength. No cranial nerve deficit.   Skin: Skin is warm and dry. No rash noted.   Psychiatric: He has a normal mood and affect. His behavior is normal.       Significant Labs:   CMP   Recent Labs  Lab 04/08/17  1430 04/09/17  0214    141   K 4.3 3.5    107   CO2 24 24   * 109   BUN 16 13   CREATININE 1.0 0.8   CALCIUM 9.5 9.0   PROT 7.1 6.3   ALBUMIN 3.9 3.6   BILITOT 0.6 0.7   ALKPHOS 57 51*   AST 47* 29   ALT 57* 46*   ANIONGAP 9 10   ESTGFRAFRICA >60 >60   EGFRNONAA >60 >60   , CBC   Recent Labs  Lab 04/08/17  1430 04/09/17  0214   WBC 11.65 12.93*   HGB 15.9 15.1   HCT 46.8 43.4    293   , INR   Recent Labs  Lab 04/08/17  1430   INR 1.0   , Lipid Panel No results for input(s): CHOL, HDL, LDLCALC, TRIG, CHOLHDL in the last 48 hours. and Troponin   Recent Labs  Lab 04/08/17  1430 04/08/17  2040 04/09/17  0214   TROPONINI 0.026 0.018 0.022     Lab Results   Component Value Date    TSH 0.643 04/08/2017         Significant Imaging:   CXR 4/8/17 NAD (image pers rev)    EKG  4/8/17 1420 , RBBB  Tele 4/9/17 1040am NSR 80    Echo 4/9/17 pending    Assessment and Plan:     * Atrial fibrillation with rapid ventricular response  CHADSVASC=0  Currently in SR, asymptomatic.  Pt reports infrequent episodes, all apparently in relation to withdrawal from klonopin, dating back to 2005.  Stop enox, amio gtt  Check echo  Restart diltiazem  Quit smoking  Assuming no significant abnormalities on echo, OK for discharge  Pt to follow up with me in the office 1-2 weeks  Will consider EPS referral for ablation.    Tobacco abuse  Importance of cessation stressed to pt.      VTE Risk Mitigation         Ordered     Medium Risk of VTE  Once      04/08/17 2210          Thank you for your consult. I will sign off. Please contact us if you have any additional questions.    Ramon Rodríguez MD  Cardiology   [facility]

## 2017-04-09 NOTE — PLAN OF CARE
Problem: Patient Care Overview  Goal: Plan of Care Review  Outcome: Ongoing (interventions implemented as appropriate)  Pt admitted to ICU with A fib on Amiodarone drip. Pt afebrile, VSS, converted to SR around 12:30am HR 70s, pt states he was able to feel when he converted. Pt free of falls or injury, skin intact. Pt seems non compliant with medication; came to ER in Afib w/ RVR 2/2017 but HR decreased after meds and was sent home on Cardizem, pt states he didn't take med because he was in SR after being discharged; also admitted for Afib w/ RVR 12/2012 but pt denies having a history of Afib; attempted to educate the patient but he insists he has fluke A Fib and once he's in SR, he doesn't need medication.

## 2017-04-09 NOTE — SUBJECTIVE & OBJECTIVE
Past Medical History:   Diagnosis Date    Anxiety     Atrial fibrillation     Chronic back pain        Past Surgical History:   Procedure Laterality Date    CHOLECYSTECTOMY      HERNIA REPAIR      NASAL SEPTUM SURGERY      RECTAL SURGERY         Review of patient's allergies indicates:   Allergen Reactions    Eggs [egg derived] Anaphylaxis    Ativan [lorazepam]      nausea    Corticosteroids (glucocorticoids) Palpitations       No current facility-administered medications on file prior to encounter.      Current Outpatient Prescriptions on File Prior to Encounter   Medication Sig    oxycodone-acetaminophen (PERCOCET)  mg per tablet Take 1 tablet by mouth every 4 (four) hours as needed for Pain.    diltiaZEM (DILACOR XR) 120 MG CDCR Take 1 capsule (120 mg total) by mouth once daily.     Family History     None        Social History Main Topics    Smoking status: Current Every Day Smoker     Packs/day: 0.50     Types: Cigarettes     Last attempt to quit: 12/7/2010    Smokeless tobacco: Never Used    Alcohol use No    Drug use: No    Sexual activity: No     Review of Systems   Constitution: Negative for chills, diaphoresis, fever, weakness and malaise/fatigue.   HENT: Negative for headaches and nosebleeds.    Eyes: Negative for blurred vision and double vision.   Cardiovascular: Positive for irregular heartbeat and palpitations. Negative for chest pain, claudication, cyanosis, dyspnea on exertion, leg swelling, orthopnea, paroxysmal nocturnal dyspnea and syncope.   Respiratory: Negative for cough, shortness of breath and wheezing.    Skin: Negative for dry skin and poor wound healing.   Musculoskeletal: Negative for back pain, joint swelling and myalgias.   Gastrointestinal: Negative for abdominal pain, nausea and vomiting.   Genitourinary: Negative for hematuria.   Neurological: Negative for dizziness, numbness and seizures.   Psychiatric/Behavioral: Negative for altered mental status and  depression.     Objective:     Vital Signs (Most Recent):  Temp: 98.6 °F (37 °C) (04/09/17 0700)  Pulse: 77 (04/09/17 1000)  Resp: 19 (04/09/17 1000)  BP: (!) 151/79 (04/09/17 1000)  SpO2: (!) 94 % (04/09/17 1000) Vital Signs (24h Range):  Temp:  [97.8 °F (36.6 °C)-98.6 °F (37 °C)] 98.6 °F (37 °C)  Pulse:  [] 77  Resp:  [6-75] 19  SpO2:  [90 %-99 %] 94 %  BP: (100-151)/(60-93) 151/79     Weight: 101.9 kg (224 lb 10.4 oz)  Body mass index is 36.26 kg/(m^2).    SpO2: (!) 94 %  O2 Device (Oxygen Therapy): room air      Intake/Output Summary (Last 24 hours) at 04/09/17 1032  Last data filed at 04/09/17 1000   Gross per 24 hour   Intake          1015.52 ml   Output             2050 ml   Net         -1034.48 ml       Lines/Drains/Airways     Peripheral Intravenous Line                 Peripheral IV - Single Lumen 04/08/17 1430 Left Antecubital less than 1 day         Peripheral IV - Single Lumen 04/08/17 1435 Right Antecubital less than 1 day                Physical Exam   Constitutional: He is oriented to person, place, and time. He appears well-developed and well-nourished.   HENT:   Head: Normocephalic and atraumatic.   Eyes: Conjunctivae and EOM are normal. Pupils are equal, round, and reactive to light. No scleral icterus.   Neck: Normal range of motion. Neck supple. No JVD present. Carotid bruit is not present. No tracheal deviation present. No thyromegaly present.   Cardiovascular: Normal rate, regular rhythm, S1 normal and S2 normal.  Exam reveals no gallop and no friction rub.    No murmur heard.  Pulmonary/Chest: Effort normal and breath sounds normal. He has no wheezes. He exhibits no tenderness.   Abdominal: Soft. Bowel sounds are normal. He exhibits no distension and no mass. There is no tenderness. There is no rebound and no guarding.   Musculoskeletal: He exhibits no edema.   Neurological: He is alert and oriented to person, place, and time. He has normal strength. No cranial nerve deficit.   Skin:  Skin is warm and dry. No rash noted.   Psychiatric: He has a normal mood and affect. His behavior is normal.       Significant Labs:   CMP   Recent Labs  Lab 04/08/17  1430 04/09/17  0214    141   K 4.3 3.5    107   CO2 24 24   * 109   BUN 16 13   CREATININE 1.0 0.8   CALCIUM 9.5 9.0   PROT 7.1 6.3   ALBUMIN 3.9 3.6   BILITOT 0.6 0.7   ALKPHOS 57 51*   AST 47* 29   ALT 57* 46*   ANIONGAP 9 10   ESTGFRAFRICA >60 >60   EGFRNONAA >60 >60   , CBC   Recent Labs  Lab 04/08/17  1430 04/09/17  0214   WBC 11.65 12.93*   HGB 15.9 15.1   HCT 46.8 43.4    293   , INR   Recent Labs  Lab 04/08/17  1430   INR 1.0   , Lipid Panel No results for input(s): CHOL, HDL, LDLCALC, TRIG, CHOLHDL in the last 48 hours. and Troponin   Recent Labs  Lab 04/08/17  1430 04/08/17  2040 04/09/17  0214   TROPONINI 0.026 0.018 0.022     Lab Results   Component Value Date    TSH 0.643 04/08/2017         Significant Imaging:   CXR 4/8/17 NAD (image pers rev)    EKG  4/8/17 1420 , RBBB  Tele 4/9/17 1040am NSR 80    Echo 4/9/17 pending

## 2017-05-24 ENCOUNTER — HOSPITAL ENCOUNTER (EMERGENCY)
Facility: HOSPITAL | Age: 57
Discharge: HOME | End: 2017-05-24
Attending: EMERGENCY MEDICINE
Payer: MEDICAID

## 2017-05-24 VITALS
OXYGEN SATURATION: 95 % | HEIGHT: 66 IN | WEIGHT: 225 LBS | RESPIRATION RATE: 18 BRPM | DIASTOLIC BLOOD PRESSURE: 68 MMHG | TEMPERATURE: 98 F | SYSTOLIC BLOOD PRESSURE: 132 MMHG | HEART RATE: 92 BPM | BODY MASS INDEX: 36.16 KG/M2

## 2017-05-24 DIAGNOSIS — I48.0 PAROXYSMAL ATRIAL FIBRILLATION: Primary | ICD-10-CM

## 2017-05-24 LAB
ALBUMIN SERPL BCP-MCNC: 3.9 G/DL
ALP SERPL-CCNC: 53 U/L
ALT SERPL W/O P-5'-P-CCNC: 27 U/L
ANION GAP SERPL CALC-SCNC: 10 MMOL/L
AST SERPL-CCNC: 25 U/L
BASOPHILS # BLD AUTO: 0.03 K/UL
BASOPHILS NFR BLD: 0.3 %
BILIRUB SERPL-MCNC: 0.5 MG/DL
BNP SERPL-MCNC: 21 PG/ML
BUN SERPL-MCNC: 15 MG/DL
CALCIUM SERPL-MCNC: 9.7 MG/DL
CHLORIDE SERPL-SCNC: 109 MMOL/L
CO2 SERPL-SCNC: 22 MMOL/L
CREAT SERPL-MCNC: 0.8 MG/DL
DIFFERENTIAL METHOD: ABNORMAL
EOSINOPHIL # BLD AUTO: 0.3 K/UL
EOSINOPHIL NFR BLD: 3.2 %
ERYTHROCYTE [DISTWIDTH] IN BLOOD BY AUTOMATED COUNT: 13.5 %
EST. GFR  (AFRICAN AMERICAN): >60 ML/MIN/1.73 M^2
EST. GFR  (NON AFRICAN AMERICAN): >60 ML/MIN/1.73 M^2
GLUCOSE SERPL-MCNC: 110 MG/DL
HCT VFR BLD AUTO: 46.5 %
HGB BLD-MCNC: 16.3 G/DL
INR PPP: 1
LYMPHOCYTES # BLD AUTO: 3.8 K/UL
LYMPHOCYTES NFR BLD: 42 %
MAGNESIUM SERPL-MCNC: 2.3 MG/DL
MCH RBC QN AUTO: 31.1 PG
MCHC RBC AUTO-ENTMCNC: 35.1 %
MCV RBC AUTO: 89 FL
MONOCYTES # BLD AUTO: 0.7 K/UL
MONOCYTES NFR BLD: 7.3 %
NEUTROPHILS # BLD AUTO: 4.3 K/UL
NEUTROPHILS NFR BLD: 47.2 %
PLATELET # BLD AUTO: 277 K/UL
PMV BLD AUTO: 9.6 FL
POTASSIUM SERPL-SCNC: 4.2 MMOL/L
PROT SERPL-MCNC: 6.9 G/DL
PROTHROMBIN TIME: 10.3 SEC
RBC # BLD AUTO: 5.24 M/UL
SODIUM SERPL-SCNC: 141 MMOL/L
TROPONIN I SERPL DL<=0.01 NG/ML-MCNC: <0.006 NG/ML
WBC # BLD AUTO: 9.09 K/UL

## 2017-05-24 PROCEDURE — 96361 HYDRATE IV INFUSION ADD-ON: CPT

## 2017-05-24 PROCEDURE — 83880 ASSAY OF NATRIURETIC PEPTIDE: CPT

## 2017-05-24 PROCEDURE — 25000003 PHARM REV CODE 250: Performed by: EMERGENCY MEDICINE

## 2017-05-24 PROCEDURE — 96374 THER/PROPH/DIAG INJ IV PUSH: CPT

## 2017-05-24 PROCEDURE — 85610 PROTHROMBIN TIME: CPT

## 2017-05-24 PROCEDURE — 85025 COMPLETE CBC W/AUTO DIFF WBC: CPT

## 2017-05-24 PROCEDURE — 83735 ASSAY OF MAGNESIUM: CPT

## 2017-05-24 PROCEDURE — 99285 EMERGENCY DEPT VISIT HI MDM: CPT | Mod: 25

## 2017-05-24 PROCEDURE — 80053 COMPREHEN METABOLIC PANEL: CPT

## 2017-05-24 PROCEDURE — 93005 ELECTROCARDIOGRAM TRACING: CPT

## 2017-05-24 PROCEDURE — 84484 ASSAY OF TROPONIN QUANT: CPT

## 2017-05-24 RX ORDER — DILTIAZEM HYDROCHLORIDE 5 MG/ML
25 INJECTION INTRAVENOUS
Status: COMPLETED | OUTPATIENT
Start: 2017-05-24 | End: 2017-05-24

## 2017-05-24 RX ORDER — DILTIAZEM HYDROCHLORIDE 120 MG/1
120 CAPSULE, EXTENDED RELEASE ORAL DAILY
Qty: 30 CAPSULE | Refills: 3 | Status: SHIPPED | OUTPATIENT
Start: 2017-05-24 | End: 2018-01-26

## 2017-05-24 RX ADMIN — SODIUM CHLORIDE 1000 ML: 0.9 INJECTION, SOLUTION INTRAVENOUS at 04:05

## 2017-05-24 RX ADMIN — DILTIAZEM HYDROCHLORIDE 25 MG: 5 INJECTION INTRAVENOUS at 04:05

## 2017-05-24 NOTE — ED PROVIDER NOTES
Encounter Date: 5/24/2017    SCRIBE #1 NOTE: I, Jordy Block , am scribing for, and in the presence of,  Andrew Mcdermott MD. I have scribed the following portions of the note - Other sections scribed: HPI, ROS.       History     Chief Complaint   Patient presents with    Palpitations     x 20 mins,a fib HR 130s with EMS      Review of patient's allergies indicates:   Allergen Reactions    Eggs [egg derived] Anaphylaxis    Ativan [lorazepam]      nausea    Corticosteroids (glucocorticoids) Palpitations     CC: Palpitations     HPI: This 56 y.o. Male smoker with a medical history of Anxiety; Atrial fibrillation; and Chronic back pain presents to the ED via EMS c/o palpitations for 20 minutes. Pt states he was sleeping and woke up A-fib. Pt is also c/o urinary incontinence during EMS transport. Symptoms are acute in onset and mild 2/10. Previous similar episode 3 months ago. Pt denies shortness of breath, chest pain, nausea, or any other associated symptoms. Pt has no modifying factors. Pt has no prior treatment.       The history is provided by the patient. No  was used.     Past Medical History:   Diagnosis Date    Anxiety     Atrial fibrillation     Chronic back pain      Past Surgical History:   Procedure Laterality Date    CHOLECYSTECTOMY      HERNIA REPAIR      NASAL SEPTUM SURGERY      RECTAL SURGERY       History reviewed. No pertinent family history.  Social History   Substance Use Topics    Smoking status: Current Every Day Smoker     Packs/day: 0.50     Types: Cigarettes     Last attempt to quit: 12/7/2010    Smokeless tobacco: Never Used    Alcohol use No     Review of Systems   Constitutional: Negative for fever.   HENT: Negative for sore throat.    Respiratory: Negative for shortness of breath.    Cardiovascular: Positive for palpitations. Negative for chest pain.   Gastrointestinal: Negative for nausea.   Genitourinary: Negative for dysuria.        (+) Urinary incontinence     Musculoskeletal: Negative for back pain.   Skin: Negative for rash.   Neurological: Negative for weakness.   Hematological: Does not bruise/bleed easily.       Physical Exam     Initial Vitals [05/24/17 0409]   BP Pulse Resp Temp SpO2   (!) 144/94 (!) 132 18 -- 98 %     Physical Exam    Nursing note and vitals reviewed.  Constitutional: He appears well-developed and well-nourished.   HENT:   Head: Normocephalic and atraumatic.   Eyes: EOM are normal. Pupils are equal, round, and reactive to light.   Cardiovascular: Normal heart sounds and intact distal pulses.   Irregular tachycardia   Pulmonary/Chest: Breath sounds normal. No respiratory distress. He has no wheezes. He has no rhonchi. He has no rales. He exhibits no tenderness.   Abdominal: Soft. Bowel sounds are normal. He exhibits no distension. There is no tenderness.   Musculoskeletal: Normal range of motion. He exhibits no edema or tenderness.   Neurological: He is alert and oriented to person, place, and time. He has normal strength.   Skin: Skin is warm and dry.         ED Course   Procedures  Labs Reviewed   CBC W/ AUTO DIFFERENTIAL - Abnormal; Notable for the following:        Result Value    MCH 31.1 (*)     All other components within normal limits   COMPREHENSIVE METABOLIC PANEL - Abnormal; Notable for the following:     CO2 22 (*)     Alkaline Phosphatase 53 (*)     All other components within normal limits   TROPONIN I   PROTIME-INR   MAGNESIUM   B-TYPE NATRIURETIC PEPTIDE            MEDICAL DECISION MAKING    This is an emergent evaluation of the patient's symptoms.  Differential diagnoses includes: Atrial fibrillation, hypomagnesemia, arrhythmia, renal failure. Room air pulse oximetry interpreted by me as normal. A decision was made to obtain the patient's old medical records.  If they were available, these records were reviewed.  Significant findings include previous evaluations for atrial fibrillation with rapid ventricular response.  EKG  significant for atrial fibrillation with rapid ventricular response.  Additional history obtained from EMS.  No leukocytosis or evidence of anemia.  Unremarkable metabolic panel.  Negative troponin.  Patient was treated with single dose of intravenous Cardizem with adequate rate control.  The patient has a history of paroxysmal atrial fibrillation.  Findings are consistent with acute exacerbation of paroxysmal atrial fibrillation.  I will restart the patient on his oral Cardizem and recommend outpatient cardiology follow-up.             Scribe Attestation:   Scribe #1: I performed the above scribed service and the documentation accurately describes the services I performed. I attest to the accuracy of the note.    Attending Attestation:           Physician Attestation for Scribe:  Physician Attestation Statement for Scribe #1: I, Andrew Mcdermott MD, reviewed documentation, as scribed by Jordy Block in my presence, and it is both accurate and complete.                 ED Course     Clinical Impression:   The encounter diagnosis was Paroxysmal atrial fibrillation.          Andrew Mcdermott MD  05/24/17 0566

## 2017-05-24 NOTE — ED TRIAGE NOTES
"Pt presents to ED via EMS with c/o "afib". Pt states irregular heartbeat woke him out of sleep. Denies chest pain & SOB. Pt has hx of a-fib.  "

## 2017-05-29 ENCOUNTER — HOSPITAL ENCOUNTER (EMERGENCY)
Facility: HOSPITAL | Age: 57
Discharge: HOME | End: 2017-05-29
Attending: EMERGENCY MEDICINE | Admitting: EMERGENCY MEDICINE
Payer: MEDICAID

## 2017-05-29 VITALS
DIASTOLIC BLOOD PRESSURE: 89 MMHG | SYSTOLIC BLOOD PRESSURE: 126 MMHG | TEMPERATURE: 98 F | OXYGEN SATURATION: 91 % | HEART RATE: 89 BPM | RESPIRATION RATE: 18 BRPM

## 2017-05-29 DIAGNOSIS — I48.91 ATRIAL FIBRILLATION: ICD-10-CM

## 2017-05-29 LAB
ANION GAP SERPL CALC-SCNC: 11 MMOL/L
BASOPHILS # BLD AUTO: 0.03 K/UL
BASOPHILS NFR BLD: 0.3 %
BUN SERPL-MCNC: 12 MG/DL
CALCIUM SERPL-MCNC: 9.4 MG/DL
CHLORIDE SERPL-SCNC: 107 MMOL/L
CO2 SERPL-SCNC: 21 MMOL/L
CREAT SERPL-MCNC: 0.7 MG/DL
DIFFERENTIAL METHOD: NORMAL
EOSINOPHIL # BLD AUTO: 0.1 K/UL
EOSINOPHIL NFR BLD: 1.4 %
ERYTHROCYTE [DISTWIDTH] IN BLOOD BY AUTOMATED COUNT: 13.5 %
EST. GFR  (AFRICAN AMERICAN): >60 ML/MIN/1.73 M^2
EST. GFR  (NON AFRICAN AMERICAN): >60 ML/MIN/1.73 M^2
GLUCOSE SERPL-MCNC: 104 MG/DL
HCT VFR BLD AUTO: 46.1 %
HGB BLD-MCNC: 16.2 G/DL
LYMPHOCYTES # BLD AUTO: 2.6 K/UL
LYMPHOCYTES NFR BLD: 27.7 %
MCH RBC QN AUTO: 30.8 PG
MCHC RBC AUTO-ENTMCNC: 35.1 %
MCV RBC AUTO: 88 FL
MONOCYTES # BLD AUTO: 0.7 K/UL
MONOCYTES NFR BLD: 7.9 %
NEUTROPHILS # BLD AUTO: 5.8 K/UL
NEUTROPHILS NFR BLD: 62.5 %
PLATELET # BLD AUTO: 286 K/UL
PMV BLD AUTO: 9.6 FL
POTASSIUM SERPL-SCNC: 3.8 MMOL/L
RBC # BLD AUTO: 5.26 M/UL
SODIUM SERPL-SCNC: 139 MMOL/L
TROPONIN I SERPL DL<=0.01 NG/ML-MCNC: 0.01 NG/ML
WBC # BLD AUTO: 9.31 K/UL

## 2017-05-29 PROCEDURE — 99284 EMERGENCY DEPT VISIT MOD MDM: CPT | Mod: ,,, | Performed by: EMERGENCY MEDICINE

## 2017-05-29 PROCEDURE — 80048 BASIC METABOLIC PNL TOTAL CA: CPT

## 2017-05-29 PROCEDURE — 85025 COMPLETE CBC W/AUTO DIFF WBC: CPT

## 2017-05-29 PROCEDURE — 93005 ELECTROCARDIOGRAM TRACING: CPT

## 2017-05-29 PROCEDURE — 84484 ASSAY OF TROPONIN QUANT: CPT

## 2017-05-29 PROCEDURE — 93010 ELECTROCARDIOGRAM REPORT: CPT | Mod: ,,, | Performed by: INTERNAL MEDICINE

## 2017-05-29 PROCEDURE — 99285 EMERGENCY DEPT VISIT HI MDM: CPT | Mod: 25

## 2017-05-29 PROCEDURE — 96374 THER/PROPH/DIAG INJ IV PUSH: CPT

## 2017-05-29 PROCEDURE — 25000003 PHARM REV CODE 250

## 2017-05-29 RX ORDER — DILTIAZEM HYDROCHLORIDE 5 MG/ML
20 INJECTION INTRAVENOUS ONCE
Status: COMPLETED | OUTPATIENT
Start: 2017-05-29 | End: 2017-05-29

## 2017-05-29 RX ORDER — DILTIAZEM HYDROCHLORIDE 60 MG/1
120 TABLET, FILM COATED ORAL
Status: COMPLETED | OUTPATIENT
Start: 2017-05-29 | End: 2017-05-29

## 2017-05-29 RX ADMIN — DILTIAZEM HYDROCHLORIDE 120 MG: 60 TABLET, FILM COATED ORAL at 10:05

## 2017-05-29 RX ADMIN — DILTIAZEM HYDROCHLORIDE 20 MG: 5 INJECTION INTRAVENOUS at 10:05

## 2017-05-29 NOTE — ED PROVIDER NOTES
"Encounter Date: 5/29/2017    SCRIBE #1 NOTE: I, Sharyn Patrick, am scribing for, and in the presence of,  Dr. Dinero. I have scribed the following portions of the note - the Resident attestation and the EKG reading. Other sections scribed: X-ray.       History     Chief Complaint   Patient presents with    Palpitations     "my heart is beating very bizarrley"     Review of patient's allergies indicates:   Allergen Reactions    Eggs [egg derived] Anaphylaxis    Ativan [lorazepam]      nausea    Corticosteroids (glucocorticoids) Palpitations     Timmy Wade is a 56 y.o. Gentleman with a PMHx of paroxysmal atrial fibrillation and chronic benzodiazepine abuse who presents to [facility] ED for palpitations.  Patient states he was in his usual state of health till this AM until he experienced what he notes as a "panic attack", and noticed that his pulse was fast and irregular.  He states he generally gets these symptoms when he has benzodiazepine withdrawal.  He had experienced this 3 times prior, once 10 years ago and twice within the past 6 months. Denies fevers, chills, N/V, shortness of breath, coughs, chest pains, diarrhea, urinary symptoms, or weaknesses.  He was seen previously in the [facility] for atrial fibrillation where he was rate controlled with diltiazem.  He was given a prescription of diltiazem 120 mg PO daily but he never took his medication, stating that he believed he only needed to take the medicine once he is in atrial fibrillation.        The history is provided by the patient.     Past Medical History:   Diagnosis Date    Anxiety     Atrial fibrillation     Chronic back pain      Past Surgical History:   Procedure Laterality Date    CHOLECYSTECTOMY      HERNIA REPAIR      NASAL SEPTUM SURGERY      RECTAL SURGERY       History reviewed. No pertinent family history.  Social History   Substance Use Topics    Smoking status: Current Every Day Smoker     Packs/day: 0.50     Types: " Cigarettes     Last attempt to quit: 12/7/2010    Smokeless tobacco: Never Used    Alcohol use No     Review of Systems   Constitutional: Negative for chills, diaphoresis, fatigue and fever.   Respiratory: Negative for cough, shortness of breath and wheezing.    Cardiovascular: Positive for palpitations. Negative for chest pain and leg swelling.   Gastrointestinal: Negative for abdominal distention, abdominal pain, constipation, diarrhea, nausea and vomiting.   Genitourinary: Negative for difficulty urinating and dysuria.   Musculoskeletal: Negative for arthralgias and myalgias.   Skin: Negative for pallor and rash.   Neurological: Negative for syncope, weakness and headaches.   Hematological: Negative for adenopathy. Does not bruise/bleed easily.   Psychiatric/Behavioral: Negative for confusion and decreased concentration.       Physical Exam     Initial Vitals [05/29/17 1004]   BP Pulse Resp Temp SpO2   137/74 101 18 98.9 °F (37.2 °C) 95 %     Physical Exam    Vitals reviewed.  Constitutional: He appears well-developed and well-nourished. He is not diaphoretic. No distress.   Pressured speech    HENT:   Head: Normocephalic and atraumatic.   Mouth/Throat: No oropharyngeal exudate.   Eyes: Pupils are equal, round, and reactive to light. No scleral icterus.   Neck: Normal range of motion. No thyromegaly present.   Cardiovascular: Normal rate, regular rhythm and normal heart sounds. Exam reveals no friction rub.    No murmur heard.  Pulmonary/Chest: Breath sounds normal. No respiratory distress. He has no wheezes.   Abdominal: Soft. Bowel sounds are normal. He exhibits no distension. There is no tenderness.   Musculoskeletal: Normal range of motion. He exhibits no edema.   Neurological: He is alert and oriented to person, place, and time. No cranial nerve deficit.   Skin: Skin is warm and dry. No erythema.   Psychiatric: He has a normal mood and affect.         ED Course   Procedures  Labs Reviewed - No data to  display  EKG Readings: (Independently Interpreted)   Heart Rate: 120.   A-fib with RVR       X-Rays:   Independently Interpreted Readings:   Chest X-Ray: No acute process     Medical Decision Making:   History:   Old Medical Records: I decided to obtain old medical records.  Initial Assessment:   1050: Patient presents with atrial fibrillation, not currently taking medication.  States secondary to benzodiazepine withdrawal.  Denies any symptoms of infectious etiology.  Given hemodynamic stability and acute onset of symptoms while off medications to control atrial fibrillation, likely so.  Will give IV diltiazem 20 mg and oral home dose of 120 mg PO to control rhythm.  Will re-assess after medication takes affect.    Differential Diagnosis:   Atrial fibrillation, panic attack, sepsis   Independently Interpreted Test(s):   I have ordered and independently interpreted X-rays - see prior notes.  I have ordered and independently interpreted EKG Reading(s) - see prior notes  Clinical Tests:   Lab Tests: Ordered and Reviewed  Radiological Study: Ordered and Reviewed  Medical Tests: Ordered and Reviewed       APC / Resident Notes:   1134: assessed again, HR better controlled after IV and PO dilt.  Will send CBC, BMP, CXR, and troponin.        Scribe Attestation:   Scribe #1: I performed the above scribed service and the documentation accurately describes the services I performed. I attest to the accuracy of the note.    Attending Attestation:   Physician Attestation Statement for Resident:  As the supervising MD   Physician Attestation Statement: I have personally seen and examined this patient.   I agree with the above history. -: Patient with long history of ED visits for paroxysmal A-fib.    As the supervising MD I agree with the above PE.    As the supervising MD I agree with the above treatment, course, plan, and disposition.   -: Rate controlled. Patient noncompliant with daily medications, so medications were  restarted. Patient is frankly fixated on the relationship of his longtime benzodiazepine use and his afib.  He asked me no fewer than 10 times whether he should be placed back on benzodiazepines since he has been off of Klonopin for 2 months.  I stressed the importance of taking aspirin and recommended that patient follow up with regular doctor who manages his A-fib. I will defer all treatment decisions, including anti-coagulation, to that doctor. Rate of 92 BPM in room when I was giving discharge instructions.           Physician Attestation for Scribe:  Physician Attestation Statement for Scribe #1: I, Dr. Dinero, reviewed documentation, as scribed by Sharyn Patrick in my presence, and it is both accurate and complete.                 ED Course     Clinical Impression:   The encounter diagnosis was Atrial fibrillation.    Disposition:   Disposition: Discharged  Condition: Stable       Juno Dinero MD  05/31/17 9428

## 2017-05-29 NOTE — ED NOTES
Discharge home, states understanding to follow up as directed. Ambulates out of ED without difficulty. States he will call a taxi in the lobby.

## 2017-05-29 NOTE — ED TRIAGE NOTES
"Patient states palpitations and " afib" 4th episode since coming off Klonopin 8 weeks ago. Has not been taking rx Cardizem, unable to fill. States his symptoms are related to Klonopin withdrawl , with pain management.  "

## 2017-05-29 NOTE — ED NOTES
Patient identifiers verified and correct for Mr Wade  C/C: palpitations  APPEARANCE: awake and alert in NAD.  SKIN: warm, dry and intact. No breakdown or bruising.  MUSCULOSKELETAL: Patient moving all extremities spontaneously, no obvious swelling or deformities noted. Ambulates independently.  RESPIRATORY: no shortness of breath.Respirations unlabored. All breath sounds CTA bilaterally.  CARDIAC: heart tones normal. Regular rate and rhythm; 2+ distal pulses; no peripheral edema. Denies CP  ABDOMEN: S/ND/NT, Denies nausea, normoactive bowel sounds present in all four quadrants. Normal stool pattern.  : voids spontaneously without difficulty.  Neurologic: AAO x 4; follows commands equal strength in all extremities; denies numbness/tingling.

## 2017-06-29 ENCOUNTER — HOSPITAL ENCOUNTER (EMERGENCY)
Facility: HOSPITAL | Age: 57
Discharge: HOME | End: 2017-06-29
Attending: EMERGENCY MEDICINE
Payer: MEDICAID

## 2017-06-29 VITALS
BODY MASS INDEX: 34.53 KG/M2 | SYSTOLIC BLOOD PRESSURE: 135 MMHG | WEIGHT: 220 LBS | HEIGHT: 67 IN | DIASTOLIC BLOOD PRESSURE: 71 MMHG | OXYGEN SATURATION: 94 % | RESPIRATION RATE: 20 BRPM | TEMPERATURE: 98 F | HEART RATE: 102 BPM

## 2017-06-29 DIAGNOSIS — I48.91 ATRIAL FIBRILLATION WITH RVR: Primary | ICD-10-CM

## 2017-06-29 LAB
ALBUMIN SERPL BCP-MCNC: 3.8 G/DL
ALP SERPL-CCNC: 55 U/L
ALT SERPL W/O P-5'-P-CCNC: 27 U/L
ANION GAP SERPL CALC-SCNC: 10 MMOL/L
AST SERPL-CCNC: 24 U/L
BASOPHILS # BLD AUTO: 0.05 K/UL
BASOPHILS NFR BLD: 0.4 %
BILIRUB SERPL-MCNC: 0.5 MG/DL
BNP SERPL-MCNC: 11 PG/ML
BUN SERPL-MCNC: 16 MG/DL
CALCIUM SERPL-MCNC: 10 MG/DL
CHLORIDE SERPL-SCNC: 107 MMOL/L
CO2 SERPL-SCNC: 27 MMOL/L
CREAT SERPL-MCNC: 0.9 MG/DL
DIFFERENTIAL METHOD: NORMAL
EOSINOPHIL # BLD AUTO: 0.4 K/UL
EOSINOPHIL NFR BLD: 3.4 %
ERYTHROCYTE [DISTWIDTH] IN BLOOD BY AUTOMATED COUNT: 13.8 %
EST. GFR  (AFRICAN AMERICAN): >60 ML/MIN/1.73 M^2
EST. GFR  (NON AFRICAN AMERICAN): >60 ML/MIN/1.73 M^2
GLUCOSE SERPL-MCNC: 109 MG/DL
HCT VFR BLD AUTO: 45.2 %
HGB BLD-MCNC: 15.9 G/DL
LYMPHOCYTES # BLD AUTO: 4.1 K/UL
LYMPHOCYTES NFR BLD: 34.3 %
MCH RBC QN AUTO: 30.8 PG
MCHC RBC AUTO-ENTMCNC: 35.2 %
MCV RBC AUTO: 88 FL
MONOCYTES # BLD AUTO: 0.9 K/UL
MONOCYTES NFR BLD: 7.7 %
NEUTROPHILS # BLD AUTO: 6.5 K/UL
NEUTROPHILS NFR BLD: 53.9 %
PLATELET # BLD AUTO: 310 K/UL
PMV BLD AUTO: 9.6 FL
POTASSIUM SERPL-SCNC: 3.8 MMOL/L
PROT SERPL-MCNC: 7.3 G/DL
RBC # BLD AUTO: 5.16 M/UL
SODIUM SERPL-SCNC: 144 MMOL/L
TROPONIN I SERPL DL<=0.01 NG/ML-MCNC: <0.006 NG/ML
WBC # BLD AUTO: 12.02 K/UL

## 2017-06-29 PROCEDURE — 25000003 PHARM REV CODE 250: Performed by: EMERGENCY MEDICINE

## 2017-06-29 PROCEDURE — 99284 EMERGENCY DEPT VISIT MOD MDM: CPT | Mod: 25

## 2017-06-29 PROCEDURE — 93005 ELECTROCARDIOGRAM TRACING: CPT

## 2017-06-29 PROCEDURE — 96374 THER/PROPH/DIAG INJ IV PUSH: CPT

## 2017-06-29 PROCEDURE — 84484 ASSAY OF TROPONIN QUANT: CPT

## 2017-06-29 PROCEDURE — 80053 COMPREHEN METABOLIC PANEL: CPT

## 2017-06-29 PROCEDURE — 85025 COMPLETE CBC W/AUTO DIFF WBC: CPT

## 2017-06-29 PROCEDURE — 83880 ASSAY OF NATRIURETIC PEPTIDE: CPT

## 2017-06-29 RX ORDER — DILTIAZEM HYDROCHLORIDE 120 MG/1
120 CAPSULE, COATED, EXTENDED RELEASE ORAL
Status: COMPLETED | OUTPATIENT
Start: 2017-06-29 | End: 2017-06-29

## 2017-06-29 RX ORDER — DILTIAZEM HYDROCHLORIDE 5 MG/ML
20 INJECTION INTRAVENOUS
Status: COMPLETED | OUTPATIENT
Start: 2017-06-29 | End: 2017-06-29

## 2017-06-29 RX ADMIN — DILTIAZEM HYDROCHLORIDE 120 MG: 120 CAPSULE, EXTENDED RELEASE ORAL at 02:06

## 2017-06-29 RX ADMIN — DILTIAZEM HYDROCHLORIDE 20 MG: 5 INJECTION INTRAVENOUS at 02:06

## 2017-06-29 NOTE — ED PROVIDER NOTES
"Encounter Date: 6/29/2017    SCRIBE #1 NOTE: I, Cass Thompson, am scribing for, and in the presence of,  Andrew Contreras MD. I have scribed the following portions of the note - Other sections scribed: ROS, HPI.       History     Chief Complaint   Patient presents with    Other     pt states I'm in afib, denies chest pain or sob     CC: Atrial Fibrillation     HPI: Patient is a 56 y.o. M with a past medical history of Anxiety; Atrial fibrillation; and Chronic back pain who presents to the ED via EMS for evaluation of palpitations. Patient states, "It feels like my heart is skipping a beat." Patient vehemently denies he has a history of Atrial Fibrillation and instead insists that he has "protracted benzo withdrawal" after discontinuing Klonopin. Consequently, he has been non-compliant with prescribed A-Fib medications and adds that these medications "give him explosive diarrhea." He has been evaluated in the ED 4 times in the last 4 months for the same complaint, requiring admission 2 months ago. He is not in pain. No symptomatic treatment PTA. Patient denies chest pain, shortness of breath, nausea, and/or vomiting.       The history is provided by the patient and the EMS personnel. No  was used.     Review of patient's allergies indicates:   Allergen Reactions    Eggs [egg derived] Anaphylaxis    Ativan [lorazepam]      nausea    Corticosteroids (glucocorticoids) Palpitations     Past Medical History:   Diagnosis Date    Anxiety     Atrial fibrillation     Chronic back pain      Past Surgical History:   Procedure Laterality Date    CHOLECYSTECTOMY      HERNIA REPAIR      NASAL SEPTUM SURGERY      RECTAL SURGERY       History reviewed. No pertinent family history.  Social History   Substance Use Topics    Smoking status: Current Every Day Smoker     Packs/day: 0.50     Types: Cigarettes     Last attempt to quit: 12/7/2010    Smokeless tobacco: Never Used    Alcohol use No     Review of " Systems   Constitutional: Negative for chills and fever.   HENT: Negative for sore throat.    Respiratory: Negative for shortness of breath.    Cardiovascular: Positive for palpitations. Negative for chest pain.   Gastrointestinal: Negative for abdominal pain, diarrhea, nausea and vomiting.   Genitourinary: Negative for dysuria.   Musculoskeletal: Negative for back pain.   Skin: Negative for rash.   Neurological: Negative for headaches.       Physical Exam     Initial Vitals [06/29/17 0156]   BP Pulse Resp Temp SpO2   (!) 147/82 97 20 97.9 °F (36.6 °C) 97 %      MAP       103.67         Physical Exam    Nursing note and vitals reviewed.  HENT:   Head: Atraumatic.   Eyes: Conjunctivae and EOM are normal.   Neck: Normal range of motion.   Cardiovascular: Exam reveals no gallop and no friction rub.    No murmur heard.  Irregular rate and rhythm   Pulmonary/Chest: Breath sounds normal. No respiratory distress. He has no wheezes. He has no rales.   Abdominal: Soft. Bowel sounds are normal. He exhibits no distension. There is no tenderness.   Musculoskeletal: Normal range of motion. He exhibits no edema.   Neurological: He is alert and oriented to person, place, and time.   Skin: Skin is warm and dry.   Psychiatric: He has a normal mood and affect.         ED Course   Procedures  Labs Reviewed   CBC W/ AUTO DIFFERENTIAL   COMPREHENSIVE METABOLIC PANEL   TROPONIN I   B-TYPE NATRIURETIC PEPTIDE             Medical Decision Making:   Initial Assessment:   56-year-old male presenting with palpitations and A. fib with RVR.  Despite numerous ER visits documented for A. fib with RVR, the patient denies a history of atrial fibrillation.  He says that he does not take daily medications because it gives him diarrhea.  He has been prescribed oral diltiazem to take as an outpatient.  Despite saying that it gives him diarrhea, he does say that he can take a calcium channel blocker without difficulty.  Labs unremarkable.  Chest x-ray  reviewed and interpreted myself shows no evidence of pneumothorax, pneumonia or pulmonary edema.  Rate controlled with diltiazem in the ED.    Patient encouraged to take diltiazem as instructed.  counseled patient on the fact that if he continues to be in A. fib without treatment, he is at high risk for a stroke.  He should follow-up with cardiology.    I think this patient is safe and stable for discharge.  Return instructions given.  Patient verbalized understanding and agreement with the plan.                Scribe Attestation:   Scribe #1: I performed the above scribed service and the documentation accurately describes the services I performed. I attest to the accuracy of the note.    Attending Attestation:           Physician Attestation for Scribe:  Physician Attestation Statement for Scribe #1: I, Andrew Contreras MD, reviewed documentation, as scribed by Cass Thompson in my presence, and it is both accurate and complete.                 ED Course     Clinical Impression:   The encounter diagnosis was Atrial fibrillation with RVR.                           Andrew Contreras MD  06/29/17 8889

## 2017-06-29 NOTE — ED TRIAGE NOTES
"  56 year old male arrives to the ED via EMS due to irregular heart beat. Pt reports "I started to feel like I was skipping a beat at 1am. Im in a.fi." Pt denies any other symptoms. Pt denies OB, CP, nvd and in no acute distress. Pt denies taking medications or a.fib.     "

## 2018-01-26 ENCOUNTER — HOSPITAL ENCOUNTER (EMERGENCY)
Facility: HOSPITAL | Age: 58
Discharge: HOME | End: 2018-01-26
Attending: EMERGENCY MEDICINE
Payer: MEDICAID

## 2018-01-26 DIAGNOSIS — I48.91 ATRIAL FIBRILLATION WITH RVR: Primary | ICD-10-CM

## 2018-01-26 DIAGNOSIS — R00.2 PALPITATIONS: ICD-10-CM

## 2018-01-26 LAB
ALBUMIN SERPL BCP-MCNC: 4.3 G/DL
ALP SERPL-CCNC: 64 U/L
ALT SERPL W/O P-5'-P-CCNC: 38 U/L
ANION GAP SERPL CALC-SCNC: 11 MMOL/L
AST SERPL-CCNC: 30 U/L
BASOPHILS # BLD AUTO: 0.05 K/UL
BASOPHILS NFR BLD: 0.5 %
BILIRUB SERPL-MCNC: 0.4 MG/DL
BNP SERPL-MCNC: 37 PG/ML
BUN SERPL-MCNC: 19 MG/DL
CALCIUM SERPL-MCNC: 9.7 MG/DL
CHLORIDE SERPL-SCNC: 105 MMOL/L
CO2 SERPL-SCNC: 27 MMOL/L
CREAT SERPL-MCNC: 0.9 MG/DL
DIFFERENTIAL METHOD: NORMAL
EOSINOPHIL # BLD AUTO: 0.3 K/UL
EOSINOPHIL NFR BLD: 2.5 %
ERYTHROCYTE [DISTWIDTH] IN BLOOD BY AUTOMATED COUNT: 13.9 %
EST. GFR  (AFRICAN AMERICAN): >60 ML/MIN/1.73 M^2
EST. GFR  (NON AFRICAN AMERICAN): >60 ML/MIN/1.73 M^2
GLUCOSE SERPL-MCNC: 113 MG/DL
HCT VFR BLD AUTO: 47.1 %
HGB BLD-MCNC: 16.2 G/DL
LYMPHOCYTES # BLD AUTO: 4.3 K/UL
LYMPHOCYTES NFR BLD: 44.1 %
MAGNESIUM SERPL-MCNC: 2.4 MG/DL
MCH RBC QN AUTO: 30.1 PG
MCHC RBC AUTO-ENTMCNC: 34.4 G/DL
MCV RBC AUTO: 87 FL
MONOCYTES # BLD AUTO: 0.9 K/UL
MONOCYTES NFR BLD: 8.8 %
NEUTROPHILS # BLD AUTO: 4.3 K/UL
NEUTROPHILS NFR BLD: 44 %
PLATELET # BLD AUTO: 327 K/UL
PMV BLD AUTO: 9.7 FL
POTASSIUM SERPL-SCNC: 3.9 MMOL/L
PROT SERPL-MCNC: 7.5 G/DL
RBC # BLD AUTO: 5.39 M/UL
SODIUM SERPL-SCNC: 143 MMOL/L
TROPONIN I SERPL DL<=0.01 NG/ML-MCNC: <0.006 NG/ML
WBC # BLD AUTO: 9.84 K/UL

## 2018-01-26 PROCEDURE — 83880 ASSAY OF NATRIURETIC PEPTIDE: CPT

## 2018-01-26 PROCEDURE — 96361 HYDRATE IV INFUSION ADD-ON: CPT

## 2018-01-26 PROCEDURE — 85025 COMPLETE CBC W/AUTO DIFF WBC: CPT

## 2018-01-26 PROCEDURE — 84484 ASSAY OF TROPONIN QUANT: CPT

## 2018-01-26 PROCEDURE — 93010 ELECTROCARDIOGRAM REPORT: CPT | Mod: ,,, | Performed by: INTERNAL MEDICINE

## 2018-01-26 PROCEDURE — 96376 TX/PRO/DX INJ SAME DRUG ADON: CPT

## 2018-01-26 PROCEDURE — 99285 EMERGENCY DEPT VISIT HI MDM: CPT | Mod: 25

## 2018-01-26 PROCEDURE — 83735 ASSAY OF MAGNESIUM: CPT

## 2018-01-26 PROCEDURE — 80053 COMPREHEN METABOLIC PANEL: CPT

## 2018-01-26 PROCEDURE — 96374 THER/PROPH/DIAG INJ IV PUSH: CPT

## 2018-01-26 PROCEDURE — 25000003 PHARM REV CODE 250: Performed by: EMERGENCY MEDICINE

## 2018-01-26 PROCEDURE — 93005 ELECTROCARDIOGRAM TRACING: CPT

## 2018-01-26 RX ORDER — DILTIAZEM HYDROCHLORIDE 5 MG/ML
10 INJECTION INTRAVENOUS
Status: COMPLETED | OUTPATIENT
Start: 2018-01-26 | End: 2018-01-26

## 2018-01-26 RX ORDER — NAPROXEN SODIUM 220 MG/1
324 TABLET, FILM COATED ORAL
Status: DISCONTINUED | OUTPATIENT
Start: 2018-01-26 | End: 2018-01-26

## 2018-01-26 RX ORDER — ASPIRIN 81 MG/1
81 TABLET ORAL DAILY
Qty: 90 TABLET | Refills: 3 | Status: SHIPPED | OUTPATIENT
Start: 2018-01-26 | End: 2018-05-16

## 2018-01-26 RX ORDER — DILTIAZEM HYDROCHLORIDE 120 MG/1
120 CAPSULE, EXTENDED RELEASE ORAL DAILY
Qty: 30 CAPSULE | Refills: 3 | Status: SHIPPED | OUTPATIENT
Start: 2018-01-26 | End: 2018-05-16

## 2018-01-26 RX ORDER — DILTIAZEM HYDROCHLORIDE 30 MG/1
30 TABLET, FILM COATED ORAL
Status: COMPLETED | OUTPATIENT
Start: 2018-01-26 | End: 2018-01-26

## 2018-01-26 RX ADMIN — SODIUM CHLORIDE 500 ML: 0.9 INJECTION, SOLUTION INTRAVENOUS at 06:01

## 2018-01-26 RX ADMIN — DILTIAZEM HYDROCHLORIDE 10 MG: 5 INJECTION INTRAVENOUS at 06:01

## 2018-01-26 RX ADMIN — DILTIAZEM HYDROCHLORIDE 10 MG: 5 INJECTION INTRAVENOUS at 08:01

## 2018-01-26 RX ADMIN — DILTIAZEM HYDROCHLORIDE 30 MG: 30 TABLET, FILM COATED ORAL at 06:01

## 2018-01-27 VITALS
DIASTOLIC BLOOD PRESSURE: 78 MMHG | TEMPERATURE: 99 F | BODY MASS INDEX: 32.93 KG/M2 | OXYGEN SATURATION: 99 % | SYSTOLIC BLOOD PRESSURE: 142 MMHG | WEIGHT: 230 LBS | HEART RATE: 105 BPM | RESPIRATION RATE: 18 BRPM | HEIGHT: 70 IN

## 2018-01-27 NOTE — ED PROVIDER NOTES
"Encounter Date: 1/26/2018    SCRIBE #1 NOTE: I, Zenon Cross II, am scribing for, and in the presence of,  Gary Bagley MD. I have scribed the following portions of the note - Other sections scribed: HPI, ROS.       History     Chief Complaint   Patient presents with    Irregular Heart Beat     Pt reports "I called 911 bc I know I'm in afib. My heartbeat is fast and I couldn't breathe. It's getting better. I need a blood thinner." Pt currently denies chest pain, SOB. Pt in afib on EMS monitor with HR jumping from 60 to 140.      CC: Irregular Heart Beat     HPI: This 57 y.o. male with atrial fibrillation, chronic back pain, and anxiety  presents to the ED via EMS for emergent evaluation of an irregular heart beat. Pt reports calling 911 after c/o acute onset, palpitations with SOB. Pt reports he was concerned and immediately called 911. No prior tx attempted. No alleviating factors. Symptoms are exacerbated with exertion. Pt denies any other symptoms.      The history is provided by the patient. No  was used.     Review of patient's allergies indicates:   Allergen Reactions    Eggs [egg derived] Anaphylaxis    Ativan [lorazepam]      nausea    Corticosteroids (glucocorticoids) Palpitations     Past Medical History:   Diagnosis Date    Anxiety     Atrial fibrillation     Chronic back pain      Past Surgical History:   Procedure Laterality Date    CHOLECYSTECTOMY      HERNIA REPAIR      NASAL SEPTUM SURGERY      RECTAL SURGERY       No family history on file.  Social History   Substance Use Topics    Smoking status: Current Every Day Smoker     Packs/day: 0.50     Types: Cigarettes     Last attempt to quit: 12/7/2010    Smokeless tobacco: Never Used    Alcohol use No     Review of Systems   Constitutional: Negative for chills, diaphoresis and fever.   HENT: Negative for ear pain and sore throat.    Eyes:        (-) eye problems   Respiratory: Positive for shortness of breath. " Negative for cough.    Cardiovascular: Positive for chest pain and palpitations.   Gastrointestinal: Negative for abdominal pain, diarrhea, nausea and vomiting.   Genitourinary: Negative for dysuria.   Musculoskeletal: Negative for back pain.        (-) arm or leg pain   Skin: Negative for rash.   Neurological: Negative for headaches.   All other systems reviewed and are negative.        Physical Exam     Initial Vitals [01/26/18 1816]   BP Pulse Resp Temp SpO2   (!) 143/92 66 16 -- (!) 94 %      MAP       109         Physical Exam  Nursing note and vitals reviewed.  Constitutional:  Well-appearing nontoxic middle-aged male in no obvious distress or discomfort  HENT:    Head: NC/AT    Eyes: Conjunctivae normal.   (-) scleral icterus.              Mouth/Throat: MMM.     Neck: Neck supple, normal rom.   Cardiovascular: Tachycardic, irregular rhythm  Pulmonary/Chest: CTAB   Abdominal: Soft. ND/NT w/o guarding or rebound. (-) CVA tenderness.  Musculoskeletal: FROM of all major joints. No extremity edema or tenderness.  Neurological: A&Ox4, Normal speech.  No acute focal motor deficits.     Skin: Skin is warm and dry.   Psychiatric: normal mood and affect.      Imaging: (independent interpretation)  Chest x-ray:  No focal consolidation, pleural effusion or pneumothorax.  Normal appearing cardiomediastinal silhouette.     EKG Readings: (Independently interpreted)   Initial Reading: No STEMI.    - A. fib with RVR, rate 115, bifascicular block.  Nonspecific T-wave abnormalities.      ED Course   Critical Care  Date/Time: 1/26/2018 10:02 PM  Performed by: YE SALAZAR.  Authorized by: YE SALAZAR   Direct patient critical care time: 20 minutes  Additional history critical care time: 10 minutes  Ordering / reviewing critical care time: 10 minutes  Documentation critical care time: 10 minutes  Total critical care time (exclusive of procedural time) : 50 minutes  Critical care time was exclusive of separately  billable procedures and treating other patients and teaching time.  Critical care was necessary to treat or prevent imminent or life-threatening deterioration of the following conditions: cardiac failure.  Critical care was time spent personally by me on the following activities: development of treatment plan with patient or surrogate, evaluation of patient's response to treatment, examination of patient, obtaining history from patient or surrogate, ordering and performing treatments and interventions, ordering and review of laboratory studies, ordering and review of radiographic studies, pulse oximetry, re-evaluation of patient's condition and review of old charts.        Labs Reviewed   COMPREHENSIVE METABOLIC PANEL - Abnormal; Notable for the following:        Result Value    Glucose 113 (*)     All other components within normal limits   CBC W/ AUTO DIFFERENTIAL   TROPONIN I   B-TYPE NATRIURETIC PEPTIDE   MAGNESIUM        Differential diagnosis:   Initial differential diagnosis includes but is not limited to... Sinus tachycardia versus tachyarrhythmia including possible SVT, A. fib, atrial flutter, stable V. Tach; toxidrome, withdrawal syndrome, PE.    Additional Medical Decision Makin-year-old male with A. fib, chronic back pain and anxiety presents to the ED via EMS for evaluation of rapid irregular heartbeat with associated shortness of breath.  Symptoms began shortly prior to EMS activation. - see hpi for further details.  Initial EKG reveals A. fib with RVR, rate 115.  The remainder of his vitals are reassuring   And stable.  Chest x-ray normal.  Basic labs including troponin and BNP within normal limits.  Rate control achieved with diabetes on exam.  He is currently on daily aspirin and based on his Chad2 score does not require anticoagulation at this time.  He has been observed in the emergency department for several hours during which time he has remained rate controlled and asymptomatic.  Patient  discharged home with instructions to follow-up with his primary care physician as well as cardiology within the week.  Chest pain precautions including return instructions have been discussed prior to discharge.                  Scribe Attestation:   Scribe #1: I performed the above scribed service and the documentation accurately describes the services I performed. I attest to the accuracy of the note.    Attending Attestation:           Physician Attestation for Scribe:  Physician Attestation Statement for Scribe #1: I, Gary Bagley MD, reviewed documentation, as scribed by Zenon Cross II in my presence, and it is both accurate and complete.                 ED Course      Clinical Impression:   The primary encounter diagnosis was Atrial fibrillation with RVR. A diagnosis of Palpitations was also pertinent to this visit.    Disposition:   Disposition: Discharged  Condition: Stable                        Gary Bagley MD  01/27/18 0304

## 2018-01-27 NOTE — ED TRIAGE NOTES
"Pt reports "I called 911 bc I know I'm in afib. My heartbeat is fast and I couldn't breathe. It's getting better. I need a blood thinner." Pt currently denies chest pain, SOB. Pt in afib on EMS monitor with HR jumping from 60 to 140.   "

## 2018-02-26 ENCOUNTER — TELEPHONE (OUTPATIENT)
Dept: SURGERY | Facility: CLINIC | Age: 58
End: 2018-02-26

## 2018-02-26 NOTE — TELEPHONE ENCOUNTER
----- Message from Yolanda Mcdowell sent at 2/26/2018  4:11 PM CST -----  Contact: patient   Patient calling to speak to the Nurse. 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 León handles these cases. Please advise. Call to pod. No answer.   Call back   Thanks!

## 2018-02-27 ENCOUNTER — TELEPHONE (OUTPATIENT)
Dept: SURGERY | Facility: CLINIC | Age: 58
End: 2018-02-27

## 2018-02-27 NOTE — TELEPHONE ENCOUNTER
Left message on cell phone that Dr León did not treat testicles, he should see a urologist.  Left message on alternate phone number to please give me a call back and that I did try to reach him on his cell and left a message.

## 2018-05-02 ENCOUNTER — TELEPHONE (OUTPATIENT)
Dept: SURGERY | Facility: CLINIC | Age: 58
End: 2018-05-02

## 2018-05-02 NOTE — TELEPHONE ENCOUNTER
----- Message from Yesica Vasquez sent at 5/2/2018  8:41 AM CDT -----  Contact: Patient  Timmy, patient [phone] patient called to speak with nurse in office, did not want to go into detail what the phone call was for. Transferred to [place]. Thanks!

## 2018-05-02 NOTE — TELEPHONE ENCOUNTER
Patient has a hernia in his abdomen, advised that Dr León did not deal with scrotal cysts.  Available appointment came up on 5-16-18

## 2018-05-03 ENCOUNTER — HOSPITAL ENCOUNTER (EMERGENCY)
Facility: HOSPITAL | Age: 58
Discharge: HOME | End: 2018-05-03
Attending: EMERGENCY MEDICINE
Payer: MEDICAID

## 2018-05-03 VITALS
WEIGHT: 217 LBS | TEMPERATURE: 99 F | OXYGEN SATURATION: 97 % | HEIGHT: 67 IN | BODY MASS INDEX: 34.06 KG/M2 | DIASTOLIC BLOOD PRESSURE: 81 MMHG | RESPIRATION RATE: 16 BRPM | SYSTOLIC BLOOD PRESSURE: 135 MMHG | HEART RATE: 87 BPM

## 2018-05-03 DIAGNOSIS — K43.9 VENTRAL HERNIA WITHOUT OBSTRUCTION OR GANGRENE: Primary | ICD-10-CM

## 2018-05-03 PROCEDURE — 99283 EMERGENCY DEPT VISIT LOW MDM: CPT | Mod: ,,, | Performed by: EMERGENCY MEDICINE

## 2018-05-03 PROCEDURE — 99283 EMERGENCY DEPT VISIT LOW MDM: CPT

## 2018-05-03 NOTE — DISCHARGE INSTRUCTIONS
Please return to ED if symptoms become worse, hernia is stuck and does not go back in, nausea/vomiting or change in bowel habits

## 2018-05-03 NOTE — ED PROVIDER NOTES
"Encounter Date: 5/3/2018    SCRIBE #1 NOTE: I, Lakeshia Bronson, am scribing for, and in the presence of,  Dr. Cevallos. I have scribed the entire note.       History     Chief Complaint   Patient presents with    Hernia     Pt c/o "bulge in my abdomen".  1st noticed a couple days ago.      Time seen by provider: 10:59 AM    This is a 57 y.o. male with medical problems including atrial fibrillation and chronic back pain who presents with complaint of abdominal pain for the past couple of days. He states he was lifting a board when he suddenly began feeling a burning pain to the abdomen with a bulge. Pain is currently 6/10 with no exacerbating or relieving factors. He endorses diarrhea and congestion. No fever, chills, leg swelling, or vomiting.       The history is provided by the patient and medical records.     Review of patient's allergies indicates:   Allergen Reactions    Eggs [egg derived] Anaphylaxis    Ativan [lorazepam]      nausea    Corticosteroids (glucocorticoids) Palpitations     Past Medical History:   Diagnosis Date    Anxiety     Atrial fibrillation     Chronic back pain      Past Surgical History:   Procedure Laterality Date    CHOLECYSTECTOMY      HERNIA REPAIR      NASAL SEPTUM SURGERY      RECTAL SURGERY       No family history on file.  Social History   Substance Use Topics    Smoking status: Current Every Day Smoker     Packs/day: 0.50     Types: Cigarettes     Last attempt to quit: 12/7/2010    Smokeless tobacco: Never Used    Alcohol use No     Review of Systems   Constitutional: Negative for chills and fever.   HENT: Positive for congestion.    Eyes: Negative for visual disturbance.   Respiratory: Negative for cough.    Cardiovascular: Negative for leg swelling.   Gastrointestinal: Positive for abdominal pain and diarrhea. Negative for vomiting.   Genitourinary: Negative for dysuria.   Musculoskeletal: Negative for back pain.   Skin: Negative for rash.   Neurological: Negative " for headaches.       Physical Exam     Initial Vitals [05/03/18 0947]   BP Pulse Resp Temp SpO2   135/81 87 16 98.6 °F (37 °C) 97 %      MAP       99         Physical Exam    Nursing note and vitals reviewed.  Constitutional: He appears well-developed and well-nourished. He is not diaphoretic. No distress.   HENT:   Head: Normocephalic and atraumatic.   Mouth/Throat: Oropharynx is clear and moist.   Neck: Normal range of motion. Neck supple. No JVD present.   Cardiovascular: Normal rate, regular rhythm, normal heart sounds and intact distal pulses.   Pulmonary/Chest: Breath sounds normal. No respiratory distress. He has no wheezes. He has no rhonchi. He has no rales.   Abdominal: Soft. There is no tenderness. There is no rebound and no guarding.   Reducible ventral hernia.    Musculoskeletal: Normal range of motion. He exhibits no edema.   Lymphadenopathy:     He has no cervical adenopathy.   Neurological: He is alert and oriented to person, place, and time. He has normal strength. No cranial nerve deficit or sensory deficit.   Skin: Skin is warm and dry.         ED Course   Procedures  Labs Reviewed - No data to display          Medical Decision Making:   History:   Old Medical Records: I decided to obtain old medical records.  Initial Assessment:   Urgent evaluation of 57 y.o. male with abdominal pain. Differential includes ventral hernia, less likely incarcerated or strangulated as it is easily reducible. I do not think he requires imaging or labs at this time. He is well appearing and in no acute distress. He states he will follow up with Dr. León general surgery. Pt given strict return precautions.             Scribe Attestation:   Scribe #1: I performed the above scribed service and the documentation accurately describes the services I performed. I attest to the accuracy of the note.    Attending Attestation:           Physician Attestation for Scribe:      Comments: I, Dr. Michelle Cevallos, personally performed  the services described in this documentation. All medical record entries made by the scribe were at my direction and in my presence.  I have reviewed the chart and agree that the record reflects my personal performance and is accurate and complete. Michelle Cevallos MD.                 Clinical Impression:   The encounter diagnosis was Ventral hernia without obstruction or gangrene.    Disposition:   Disposition: Discharged  Condition: Stable                        Michelle Cevallos MD  05/03/18 0267

## 2018-05-03 NOTE — ED TRIAGE NOTES
Pt presents with a bulge in his abdomen that he first noticed after heavy lifting two days ago. Pt describes pain as a burning sensation.

## 2018-05-03 NOTE — ED NOTES
LOC: The patient is awake, alert and aware of environment with an anxious affect, the patient is oriented x 3 and speaking appropriately.  APPEARANCE: Patient resting comfortably and in no acute distress, patient is clean and well groomed, patient's clothing is properly fastened.  SKIN: The skin is warm and dry, patient has normal skin turgor and moist mucus membranes, skin intact, no breakdown or brusing noted.  MUSKULOSKELETAL: Patient moving all extremities well, no obvious swelling or deformities noted.  RESPIRATORY: Airway is open and patent, respirations are spontaneous, patient has a normal effort and rate. Breath sounds are clear and equal bilaterally.  CARDIAC: Normal heart sounds. No peripheral edema.  ABDOMEN: Tender bulge noted superior to the navel. Bowel sounds present.  NEURO: No neuro deficits, hand grasp equal, no drift noted, no facial droop noted. Speech is clear.

## 2018-05-16 ENCOUNTER — OFFICE VISIT (OUTPATIENT)
Dept: SURGERY | Facility: CLINIC | Age: 58
End: 2018-05-16
Payer: MEDICAID

## 2018-05-16 VITALS
SYSTOLIC BLOOD PRESSURE: 135 MMHG | DIASTOLIC BLOOD PRESSURE: 89 MMHG | HEART RATE: 89 BPM | BODY MASS INDEX: 34.88 KG/M2 | HEIGHT: 67 IN | WEIGHT: 222.25 LBS

## 2018-05-16 DIAGNOSIS — M62.08 RECTUS DIASTASIS: Primary | ICD-10-CM

## 2018-05-16 PROCEDURE — 99204 OFFICE O/P NEW MOD 45 MIN: CPT | Mod: S$PBB,,, | Performed by: SURGERY

## 2018-05-16 PROCEDURE — 99999 PR PBB SHADOW E&M-EST. PATIENT-LVL III: CPT | Mod: PBBFAC,,, | Performed by: SURGERY

## 2018-05-16 PROCEDURE — 99213 OFFICE O/P EST LOW 20 MIN: CPT | Mod: PBBFAC,PO | Performed by: SURGERY

## 2018-05-16 NOTE — PROGRESS NOTES
Subjective:       Patient ID: Timmy Wade is a 57 y.o. male.    Chief Complaint: Consult (hernia above navel)    Patient presents for evaluation of possible HERNIA:      Timmy Wade is an 57 y.o. male who was referred for evaluation of a  ventral hernia. Symptoms were first noted 4 months ago. Symptoms or injury did not occur at work. Pain is dull, intermittent and is reducible. The patient has no symptoms of  chronic constipation, chronic cough, difficulty urinating. There does not have previous hx of  surgery.           Review of Systems   Constitutional: Negative for activity change, appetite change, chills, fatigue, fever and unexpected weight change.   HENT: Negative for congestion, dental problem, hearing loss, nosebleeds, sinus pressure, sore throat and voice change.    Eyes: Negative for pain and visual disturbance.   Respiratory: Negative for cough, chest tightness and shortness of breath.    Cardiovascular: Negative for chest pain, palpitations and leg swelling.   Gastrointestinal: Negative for abdominal distention, abdominal pain, constipation, diarrhea, nausea and vomiting.   Endocrine: Negative for cold intolerance and heat intolerance.   Genitourinary: Negative for difficulty urinating, flank pain, frequency and hematuria.   Musculoskeletal: Negative for arthralgias, back pain, gait problem, joint swelling, myalgias, neck pain and neck stiffness.   Skin: Negative for rash.   Allergic/Immunologic: Negative for immunocompromised state.   Neurological: Negative for dizziness, tremors, seizures, syncope, weakness, light-headedness, numbness and headaches.   Hematological: Negative for adenopathy. Does not bruise/bleed easily.   Psychiatric/Behavioral: Negative for confusion, decreased concentration, hallucinations, sleep disturbance and suicidal ideas. The patient is not nervous/anxious.        Objective:      Physical Exam   Constitutional: He is oriented to person, place, and time. He  appears well-developed and well-nourished.   HENT:   Head: Normocephalic and atraumatic.   Right Ear: External ear normal.   Left Ear: External ear normal.   Eyes: Conjunctivae are normal. Pupils are equal, round, and reactive to light.   Neck: Normal range of motion.   Cardiovascular: Normal rate and regular rhythm.    Pulmonary/Chest: Effort normal. No respiratory distress. He exhibits no tenderness.   Abdominal: Soft. He exhibits no distension and no mass.       Musculoskeletal: He exhibits no edema or tenderness.   Neurological: He is alert and oriented to person, place, and time. He has normal reflexes. No cranial nerve deficit.   Skin: Skin is warm and dry. No rash noted. No erythema. No pallor.   Psychiatric: He has a normal mood and affect. His behavior is normal. Judgment and thought content normal.   Nursing note and vitals reviewed.      Assessment:       1. Rectus diastasis        Plan:       Discussed with patient.  Not a hernia and no surgery needed.  Instructed on exercises and weight loss.

## 2018-05-24 ENCOUNTER — TELEPHONE (OUTPATIENT)
Dept: SURGERY | Facility: CLINIC | Age: 58
End: 2018-05-24

## 2018-05-24 NOTE — TELEPHONE ENCOUNTER
Patient is upset and embarrassed that he wasted Dr León's time coming in for what was not a hernia.  He states he went to the ER and was told he had a hernia but says they did not examine him.  He wants to apologize for wasting his time.  Advised not to worry, he was doing what he was told and he does not have to be embarrassed.  He wants to have  A colonoscopy but does not what the traditional one, he wants one like they advertise on TV.  Advised that he would need to see an GI doctor that would do that type of test, Dr León only does the traditional colonoscopy.

## 2018-05-24 NOTE — TELEPHONE ENCOUNTER
----- Message from Ramonita Mehta sent at 5/24/2018  9:56 AM CDT -----  Contact: Self  Patient needs to speak to the nurse about a colonoscopy     Please call back [phone] (home)

## 2018-09-04 ENCOUNTER — HOSPITAL ENCOUNTER (EMERGENCY)
Facility: HOSPITAL | Age: 58
Discharge: LEFT BEFORE DISCHARGE | End: 2018-09-05
Attending: EMERGENCY MEDICINE
Payer: MEDICAID

## 2018-09-04 VITALS
OXYGEN SATURATION: 96 % | DIASTOLIC BLOOD PRESSURE: 66 MMHG | HEIGHT: 72 IN | TEMPERATURE: 98 F | HEART RATE: 92 BPM | SYSTOLIC BLOOD PRESSURE: 128 MMHG | BODY MASS INDEX: 33.86 KG/M2 | WEIGHT: 250 LBS | RESPIRATION RATE: 19 BRPM

## 2018-09-04 DIAGNOSIS — I48.91 ATRIAL FIBRILLATION WITH RAPID VENTRICULAR RESPONSE: Primary | ICD-10-CM

## 2018-09-04 DIAGNOSIS — R07.9 CHEST PAIN: ICD-10-CM

## 2018-09-04 LAB
ALBUMIN SERPL BCP-MCNC: 3.9 G/DL
ALP SERPL-CCNC: 63 U/L
ALT SERPL W/O P-5'-P-CCNC: 19 U/L
ANION GAP SERPL CALC-SCNC: 13 MMOL/L
AST SERPL-CCNC: 17 U/L
BASOPHILS # BLD AUTO: 0.07 K/UL
BASOPHILS NFR BLD: 0.6 %
BILIRUB SERPL-MCNC: 0.3 MG/DL
BNP SERPL-MCNC: 11 PG/ML
BUN SERPL-MCNC: 17 MG/DL
CALCIUM SERPL-MCNC: 10 MG/DL
CHLORIDE SERPL-SCNC: 104 MMOL/L
CO2 SERPL-SCNC: 24 MMOL/L
CREAT SERPL-MCNC: 0.8 MG/DL
DIFFERENTIAL METHOD: NORMAL
EOSINOPHIL # BLD AUTO: 0.4 K/UL
EOSINOPHIL NFR BLD: 3.4 %
ERYTHROCYTE [DISTWIDTH] IN BLOOD BY AUTOMATED COUNT: 14.4 %
EST. GFR  (AFRICAN AMERICAN): >60 ML/MIN/1.73 M^2
EST. GFR  (NON AFRICAN AMERICAN): >60 ML/MIN/1.73 M^2
GLUCOSE SERPL-MCNC: 130 MG/DL
HCT VFR BLD AUTO: 46.6 %
HGB BLD-MCNC: 16.3 G/DL
LYMPHOCYTES # BLD AUTO: 4.8 K/UL
LYMPHOCYTES NFR BLD: 43.5 %
MCH RBC QN AUTO: 30 PG
MCHC RBC AUTO-ENTMCNC: 35 G/DL
MCV RBC AUTO: 86 FL
MONOCYTES # BLD AUTO: 0.9 K/UL
MONOCYTES NFR BLD: 8.4 %
NEUTROPHILS # BLD AUTO: 4.8 K/UL
NEUTROPHILS NFR BLD: 43.9 %
PLATELET # BLD AUTO: 297 K/UL
PMV BLD AUTO: 9.5 FL
POTASSIUM SERPL-SCNC: 3.7 MMOL/L
PROT SERPL-MCNC: 7 G/DL
RBC # BLD AUTO: 5.44 M/UL
SODIUM SERPL-SCNC: 141 MMOL/L
TROPONIN I SERPL DL<=0.01 NG/ML-MCNC: 0.01 NG/ML
WBC # BLD AUTO: 11.04 K/UL

## 2018-09-04 PROCEDURE — 80053 COMPREHEN METABOLIC PANEL: CPT

## 2018-09-04 PROCEDURE — 96374 THER/PROPH/DIAG INJ IV PUSH: CPT

## 2018-09-04 PROCEDURE — 96361 HYDRATE IV INFUSION ADD-ON: CPT

## 2018-09-04 PROCEDURE — 84484 ASSAY OF TROPONIN QUANT: CPT

## 2018-09-04 PROCEDURE — 93005 ELECTROCARDIOGRAM TRACING: CPT

## 2018-09-04 PROCEDURE — 83880 ASSAY OF NATRIURETIC PEPTIDE: CPT

## 2018-09-04 PROCEDURE — 99285 EMERGENCY DEPT VISIT HI MDM: CPT | Mod: 25

## 2018-09-04 PROCEDURE — 85025 COMPLETE CBC W/AUTO DIFF WBC: CPT

## 2018-09-04 PROCEDURE — 93010 ELECTROCARDIOGRAM REPORT: CPT | Mod: ,,, | Performed by: INTERNAL MEDICINE

## 2018-09-04 PROCEDURE — 25000003 PHARM REV CODE 250: Performed by: EMERGENCY MEDICINE

## 2018-09-04 RX ORDER — DILTIAZEM HYDROCHLORIDE 5 MG/ML
20 INJECTION INTRAVENOUS
Status: COMPLETED | OUTPATIENT
Start: 2018-09-04 | End: 2018-09-04

## 2018-09-04 RX ADMIN — SODIUM CHLORIDE 1000 ML: 0.9 INJECTION, SOLUTION INTRAVENOUS at 10:09

## 2018-09-04 RX ADMIN — DILTIAZEM HYDROCHLORIDE 20 MG: 5 INJECTION INTRAVENOUS at 11:09

## 2018-09-05 RX ORDER — METOPROLOL TARTRATE 25 MG/1
25 TABLET, FILM COATED ORAL EVERY 12 HOURS
Qty: 60 TABLET | Refills: 11 | Status: SHIPPED | OUTPATIENT
Start: 2018-09-05 | End: 2021-08-09 | Stop reason: CLARIF

## 2018-09-05 RX ORDER — ASPIRIN 325 MG
325 TABLET ORAL DAILY
Refills: 0 | COMMUNITY
Start: 2018-09-05 | End: 2019-09-05

## 2018-09-05 NOTE — ED TRIAGE NOTES
"Patient present to the ER via EMS. Patient presents with a fib. Patient reports he has "protract benzo withdrawal" self diagnosed. Stated he took 8 baby aspirins (81 mg). Mild SOB. Denies n/v/d. Denies pain.   "
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
## 2018-09-05 NOTE — ED PROVIDER NOTES
"Encounter Date: 9/4/2018    SCRIBE #1 NOTE: I, Darek Frost, am scribing for, and in the presence of,  Andres Norton III, MD. I have scribed the following portions of the note - Other sections scribed: HPI, ROS, PE.       History     Chief Complaint   Patient presents with    Atrial Fibrillation     hx of afib, from 90-110s, denies CP or SOB, reports feeling palpitations, pt took 8, 81asa before EMS arrival      CC: CP    58 y.o M with pmhx of Atrial fibrillation presents to the ED c/o fluttering heart (9pm) and CP (resolved). He states his symptoms started while lying down at 9pm. He reports last being symptomatic for Afib in January. He also reports attempting 8 81 mg aspirin after the onset of his symptoms tonight to "prevent clots". He denies previous heart attack. He reports a history of atrial fibrillation and states that he has had multiple previous ED visits for this diagnosis but that he has never been admitted and has never been started on long-term anticoagulation therapy. He does not have a cardiologist. He denies fever, chills, and diarrhea. No other symptoms to report.       The history is provided by the patient. No  was used.     Review of patient's allergies indicates:  No Known Allergies  Past Medical History:   Diagnosis Date    Atrial fibrillation      History reviewed. No pertinent surgical history.  History reviewed. No pertinent family history.  Social History     Tobacco Use    Smoking status: Current Every Day Smoker   Substance Use Topics    Alcohol use: No     Frequency: Never    Drug use: Not on file     Review of Systems   Constitutional: Negative for chills and fever.   HENT: Negative for sore throat and trouble swallowing.    Eyes: Negative for visual disturbance.   Respiratory: Negative for cough and shortness of breath.    Cardiovascular: Positive for chest pain (resolved) and palpitations. Negative for leg swelling.   Gastrointestinal: Negative for abdominal " pain, constipation, diarrhea, nausea and vomiting.   Endocrine: Negative for polydipsia and polyuria.   Genitourinary: Negative for difficulty urinating, dysuria and frequency.   Musculoskeletal: Negative for arthralgias and myalgias.   Skin: Negative for rash.   Neurological: Negative for dizziness, syncope, weakness, light-headedness, numbness and headaches.       Physical Exam     Initial Vitals [09/04/18 2132]   BP Pulse Resp Temp SpO2   129/68 100 19 98.4 °F (36.9 °C) 95 %      MAP       --         Physical Exam    Nursing note and vitals reviewed.  Constitutional: He appears well-developed and well-nourished. He is not diaphoretic. No distress.   HENT:   Head: Normocephalic and atraumatic.   Mouth/Throat: Oropharynx is clear and moist.   Eyes: Conjunctivae and EOM are normal. Pupils are equal, round, and reactive to light. No scleral icterus.   Neck: Normal range of motion. Neck supple. No JVD present.   Cardiovascular: Intact distal pulses. An irregularly irregular rhythm present.  Tachycardia present.  Exam reveals no gallop and no friction rub.    No murmur heard.  Pulses:       Radial pulses are 2+ on the right side, and 2+ on the left side.        Dorsalis pedis pulses are 2+ on the right side, and 2+ on the left side.   No peripheral edema.   Pulmonary/Chest: No stridor. No respiratory distress. He has no decreased breath sounds. He has no wheezes. He has no rhonchi. He has no rales.   Abdominal: Soft. Bowel sounds are normal. He exhibits no distension. There is no tenderness.   Musculoskeletal: Normal range of motion. He exhibits no edema or tenderness.   Negative bilateral dejan's sign. No calf swelling or tenderness.   Neurological: He is alert and oriented to person, place, and time. He has normal strength. No cranial nerve deficit or sensory deficit. GCS eye subscore is 4. GCS verbal subscore is 5. GCS motor subscore is 6.   Skin: Skin is warm and dry. No pallor.   Psychiatric: He has a normal mood  and affect.         ED Course   Procedures  Labs Reviewed - No data to display  EKG Readings: (Independently Interpreted)   9/4/18 22:07 - Atrial fibrillation with RVR. V-rate 113 bpm. Left axis deviation. Nml. RBBB. No ST elevation/depression. T wave flattening in III, aVF.    9/4/18 23:39 - Atrial fibrillation with RVR. V-rate 104 bpm. Left axis deviation. Nml. RBBB. No ST elevation/depression. T wave flattening in III, aVF.       Imaging Results    None          Medical Decision Making:   History:   Old Medical Records: I decided to obtain old medical records.  Independently Interpreted Test(s):   I have ordered and independently interpreted EKG Reading(s) - see prior notes  Clinical Tests:   Lab Tests: Ordered and Reviewed  Radiological Study: Ordered and Reviewed  Medical Tests: Ordered and Reviewed  ED Management:  9/4/18 23:42 - Patient reports resolution of palpitations. He thinks that he is back in NSR. Repeat EKG shows persistent a-fib. HR is now  after 20 mg IV Cardizem bolus. Pt reports no recurrence of CP since arrival. I have recommended admission. The patient is adamant about not being admitted to the hospital. His 87 year old mother lives with him and she is unable to care for herself and no one else is available to watch her.  He wishes to leave against medical advice.  I have discussed risks of doing so with him including possible undiagnosed MI, persistent atrial fibrillation with rapid ventricular response leading to sequelae such as acute coronary syndromes or potentially heart failure, pulmonary embolism, stroke.  He voiced his understanding and still wishes to leave against medical advice.  He has been instructed to start taking aspirin 325 mg daily.  Additionally, I will start him on metoprolol for heart rate control.  He has been instructed to call the clinic of Dr. Owusu tomorrow to arrange prompt follow-up for further outpatient evaluation and management.  He has been instructed to  return to the ED immediately should he have recurrence of palpitations, chest pain, breathing difficulty, or for any other concerns.            Scribe Attestation:   Scribe #1: I performed the above scribed service and the documentation accurately describes the services I performed. I attest to the accuracy of the note.    Attending Attestation:           Physician Attestation for Scribe:  Physician Attestation Statement for Scribe #1: I, Andres Norton III, MD, reviewed documentation, as scribed by Darek Frost in my presence, and it is both accurate and complete.                    Clinical Impression:   The primary encounter diagnosis was Atrial fibrillation with rapid ventricular response. A diagnosis of Chest pain was also pertinent to this visit.      Disposition:   Disposition: AMA  Condition: Stable                        Andres Norton III, MD  09/05/18 0005

## 2018-09-05 NOTE — ED NOTES
Patient stated he doesn't want to be admitted to the hospital. MD notified and said he will assess the patient

## 2018-09-17 ENCOUNTER — TELEPHONE (OUTPATIENT)
Dept: SURGERY | Facility: CLINIC | Age: 58
End: 2018-09-17

## 2018-09-17 DIAGNOSIS — K46.9 HERNIA OF ABDOMINAL CAVITY: Primary | ICD-10-CM

## 2018-09-17 DIAGNOSIS — R10.84 ABDOMINAL PAIN, GENERALIZED: ICD-10-CM

## 2018-09-17 NOTE — TELEPHONE ENCOUNTER
----- Message from Ekaterina Flowers sent at 9/17/2018 12:31 PM CDT -----  Patient states that he need to speak to you in regards to a hernia.  Please call patient at [phone].

## 2018-09-17 NOTE — TELEPHONE ENCOUNTER
Patient states he saw another doctor and was told that he has a hernia and his intestines are popping out.  He is going to have an ultrasound done and will bring all his results with him.  Appointment scheduled 10-1-18 at 320 pm and put on the wait list for a sooner appointment.

## 2018-09-26 ENCOUNTER — HOSPITAL ENCOUNTER (EMERGENCY)
Facility: HOSPITAL | Age: 58
Discharge: LEFT BEFORE DISCHARGE | End: 2018-09-26
Attending: EMERGENCY MEDICINE
Payer: MEDICAID

## 2018-09-26 VITALS
DIASTOLIC BLOOD PRESSURE: 71 MMHG | TEMPERATURE: 98 F | HEART RATE: 104 BPM | OXYGEN SATURATION: 95 % | SYSTOLIC BLOOD PRESSURE: 128 MMHG

## 2018-09-26 DIAGNOSIS — I48.91 ATRIAL FIBRILLATION: ICD-10-CM

## 2018-09-26 DIAGNOSIS — R00.2 PALPITATIONS: ICD-10-CM

## 2018-09-26 LAB
ALBUMIN SERPL BCP-MCNC: 3.8 G/DL
ALP SERPL-CCNC: 70 U/L
ALT SERPL W/O P-5'-P-CCNC: 23 U/L
ANION GAP SERPL CALC-SCNC: 11 MMOL/L
AST SERPL-CCNC: 23 U/L
BASOPHILS # BLD AUTO: 0.05 K/UL
BASOPHILS NFR BLD: 0.5 %
BILIRUB SERPL-MCNC: 0.4 MG/DL
BUN SERPL-MCNC: 20 MG/DL
CALCIUM SERPL-MCNC: 10.4 MG/DL
CHLORIDE SERPL-SCNC: 108 MMOL/L
CO2 SERPL-SCNC: 23 MMOL/L
CREAT SERPL-MCNC: 1 MG/DL
DIFFERENTIAL METHOD: NORMAL
EOSINOPHIL # BLD AUTO: 0.4 K/UL
EOSINOPHIL NFR BLD: 3.4 %
ERYTHROCYTE [DISTWIDTH] IN BLOOD BY AUTOMATED COUNT: 13.9 %
EST. GFR  (AFRICAN AMERICAN): >60 ML/MIN/1.73 M^2
EST. GFR  (NON AFRICAN AMERICAN): >60 ML/MIN/1.73 M^2
GLUCOSE SERPL-MCNC: 138 MG/DL
HCT VFR BLD AUTO: 46.1 %
HGB BLD-MCNC: 16 G/DL
INR PPP: 0.9
LYMPHOCYTES # BLD AUTO: 4 K/UL
LYMPHOCYTES NFR BLD: 36.6 %
MCH RBC QN AUTO: 30.4 PG
MCHC RBC AUTO-ENTMCNC: 34.7 G/DL
MCV RBC AUTO: 88 FL
MONOCYTES # BLD AUTO: 0.9 K/UL
MONOCYTES NFR BLD: 8.1 %
NEUTROPHILS # BLD AUTO: 5.7 K/UL
NEUTROPHILS NFR BLD: 51.4 %
PLATELET # BLD AUTO: 295 K/UL
PMV BLD AUTO: 9.9 FL
POTASSIUM SERPL-SCNC: 3.7 MMOL/L
PROT SERPL-MCNC: 7.7 G/DL
PROTHROMBIN TIME: 9.8 SEC
RBC # BLD AUTO: 5.26 M/UL
SODIUM SERPL-SCNC: 142 MMOL/L
TROPONIN I SERPL DL<=0.01 NG/ML-MCNC: 0.01 NG/ML
WBC # BLD AUTO: 11 K/UL

## 2018-09-26 PROCEDURE — 96361 HYDRATE IV INFUSION ADD-ON: CPT

## 2018-09-26 PROCEDURE — 85610 PROTHROMBIN TIME: CPT

## 2018-09-26 PROCEDURE — 25000003 PHARM REV CODE 250: Performed by: EMERGENCY MEDICINE

## 2018-09-26 PROCEDURE — 93005 ELECTROCARDIOGRAM TRACING: CPT

## 2018-09-26 PROCEDURE — 96374 THER/PROPH/DIAG INJ IV PUSH: CPT

## 2018-09-26 PROCEDURE — 85025 COMPLETE CBC W/AUTO DIFF WBC: CPT

## 2018-09-26 PROCEDURE — 84484 ASSAY OF TROPONIN QUANT: CPT

## 2018-09-26 PROCEDURE — 80053 COMPREHEN METABOLIC PANEL: CPT

## 2018-09-26 PROCEDURE — 93010 ELECTROCARDIOGRAM REPORT: CPT | Mod: ,,, | Performed by: INTERNAL MEDICINE

## 2018-09-26 PROCEDURE — 99284 EMERGENCY DEPT VISIT MOD MDM: CPT | Mod: 25

## 2018-09-26 RX ORDER — ASPIRIN 325 MG
325 TABLET ORAL 2 TIMES DAILY
COMMUNITY
End: 2018-10-28

## 2018-09-26 RX ORDER — DILTIAZEM HYDROCHLORIDE 120 MG/1
120 CAPSULE, COATED, EXTENDED RELEASE ORAL
Status: COMPLETED | OUTPATIENT
Start: 2018-09-26 | End: 2018-09-26

## 2018-09-26 RX ORDER — DILTIAZEM HYDROCHLORIDE 5 MG/ML
20 INJECTION INTRAVENOUS
Status: COMPLETED | OUTPATIENT
Start: 2018-09-26 | End: 2018-09-26

## 2018-09-26 RX ADMIN — DILTIAZEM HYDROCHLORIDE 20 MG: 5 INJECTION INTRAVENOUS at 04:09

## 2018-09-26 RX ADMIN — DILTIAZEM HYDROCHLORIDE 120 MG: 120 CAPSULE, COATED, EXTENDED RELEASE ORAL at 04:09

## 2018-09-26 RX ADMIN — SODIUM CHLORIDE 1000 ML: 0.9 INJECTION, SOLUTION INTRAVENOUS at 04:09

## 2018-09-26 NOTE — ED TRIAGE NOTES
Pt came in stating his Afib is acting up; has a hx of Afib; pt is AAOx4; denies chest pain or any other pain; feels palpitations

## 2018-09-26 NOTE — ED NOTES
"Pt reports "I want to leave, I am good now. This happens all the time and I am ready to go." Dr. Scott made aware, Dr. Scott to bedside. AMA from signed. Risks explained to patient. Pt ambulated off unit.  "

## 2018-09-26 NOTE — ED PROVIDER NOTES
"Encounter Date: 9/26/2018    SCRIBE #1 NOTE: I, Js Davalos, am scribing for, and in the presence of,  Lizette Scott MD. I have scribed the following portions of the note - Other sections scribed: HPI, ROS, PE.       History     Chief Complaint   Patient presents with    Palpitations     "My afib is acting up."  C/o palpitaitons, SOB, and chest pain.    Atrial Fibrillation     HPI     This is a 59 y/o M PMHx of Atrial Fibrillation, Chronic back pain, and Anxiety who presents to the ED for emergent evaluation of acute atrial fibrillation that began approximately 30 minutes PTA.    Pt reports a transient episode of palpitations and SOB that spontaneously resolved since being placed in an ED bed today. Pt reports that initial onset of atrial fibrillation began after discontinuing Clonopin, which he took for 4 years before discontinuation. He notes 4-5 episodes of atrial fibrillation within the last year. He states that he typically takes two baby aspirins daily for atrial fibrillation, which he is compliant and took today. He does not take calcium channel blockers secondary to "lethargic affect." He has never been placed on an anticoagulant. Pt is not followed by a Cardiologist due to Cardiologist not accepting Medicaid.    Pt denies nausea, vomiting, fever, chills, CP, alcohol use, or recreational drug use. However, he does admit to tobacco use.       Review of patient's allergies indicates:   Allergen Reactions    Eggs [egg derived] Anaphylaxis    Ativan [lorazepam]      nausea    Corticosteroids (glucocorticoids) Palpitations     Past Medical History:   Diagnosis Date    Anxiety     Atrial fibrillation     Chronic back pain      Past Surgical History:   Procedure Laterality Date    CHOLECYSTECTOMY      HERNIA REPAIR      NASAL SEPTUM SURGERY      RECTAL SURGERY       No family history on file.  Social History     Tobacco Use    Smoking status: Current Every Day Smoker     Packs/day: 0.50     Types: " Cigarettes     Last attempt to quit: 2010     Years since quittin.8    Smokeless tobacco: Never Used   Substance Use Topics    Alcohol use: No    Drug use: No     Review of Systems   All other review of systems is negative barring     Physical Exam     Initial Vitals   BP Pulse Resp Temp SpO2   18 1601 18 1601 -- 18 1659 18 1632   112/76 (!) 134  97.9 °F (36.6 °C) (!) 92 %      MAP       --                Physical Exam   Constitutional: Pt appears well-developed and well-nourished. No distress.   HENT:   Head: Normocephalic and atraumatic.   Mouth/Throat: No oropharyngeal exudate.   Eyes: Conjunctivae and EOM are normal. Pupils are equal, round, and reactive to light. No scleral icterus.   Neck: Normal range of motion. Neck supple. No JVD present.   Cardiovascular: Aibb with RVR, rate 140s  Pulmonary/Chest: Breath sounds normal. No stridor. No respiratory distress. Pt has no wheezes. Pt has no rhonchi.   Abdominal: Soft. Bowel sounds are normal. Pt exhibits no distension and no mass. There is no tenderness. There is no rebound and no guarding.   Musculoskeletal: Normal range of motion. Pt exhibits no edema or tenderness.   Lymphadenopathy:   Pt has no cervical adenopathy.   Neurological: Pt is alert and oriented to person, place, and time. Pt has normal strength and normal reflexes. Pt displays normal reflexes. No cranial nerve deficit or sensory deficit.   Skin: Skin is warm and dry. Capillary refill takes less than 2 seconds. No rash and no abscess noted. No erythema.      ED Course   Procedures  Labs Reviewed   COMPREHENSIVE METABOLIC PANEL - Abnormal; Notable for the following components:       Result Value    Glucose 138 (*)     All other components within normal limits   CBC W/ AUTO DIFFERENTIAL   TROPONIN I   PROTIME-INR          Imaging Results          X-Ray Chest 1 View (Final result)  Result time 18 16:58:12    Final result by Gary Reilly MD (18  16:58:12)                 Impression:      No acute chest disease identified.  No detrimental change is noted when compared to 01/26/2018.      Electronically signed by: Gary Reilly MD  Date:    09/26/2018  Time:    16:58             Narrative:    EXAMINATION:  XR CHEST 1 VIEW    CLINICAL HISTORY:  Unspecified atrial fibrillation    TECHNIQUE:  Single frontal view of the chest was performed.    COMPARISON:  01/26/2018.    FINDINGS:  The heart and mediastinal structures are stable.  Pulmonary vasculature is within normal limits.  The lungs are free of focal consolidations.  There is no evidence for pneumothorax or large pleural effusions.  Bony structures are grossly intact.                                            Scribe Attestation:   Scribe #1: I performed the above scribed service and the documentation accurately describes the services I performed. I attest to the accuracy of the note.    Attending Attestation:           Physician Attestation for Scribe:  Physician Attestation Statement for Scribe #1: I, Lizette Scott MD, reviewed documentation, as scribed by Js Davalos in my presence, and it is both accurate and complete.                    Clinical Impression:   Diagnoses of Palpitations and Atrial fibrillation were pertinent to this visit.    MD NOTE, 6:09pm:  Patient is stable no acute distress vital signs stable, patient presents with AFib RVR, blood pressure stable, patient with multiple visits in the past for the same, he is generally given IV and p.o. diltiazem and discharged home once he is rate controlled, status post 20 mg IV diltiazem and 120 mg p.o. diltiazem patient is rate controlled here, who is CMP is unimpressive, his CBC shows no signs of infection, his hemoglobin is 16, troponin is negative, chest x-ray shows no active disease, patient is well-appearing in no acute distress, patient wishes to go against medical advice, went to go speak to him, he is alert and oriented x4, he  understands risks are cardiac arrest and death, benefits are staying for observation and possible admission to ensure he stays rate controlled, patient still wishes to leave, he is entirely within decision making capability, he will follow up this family doctor in 1-2 days, he will speak to his family doctor tomorrow about getting on rate control medication, his repeat physical exam including cardiac exam is unimpressive, he signed AMA paperwork, I also gave him ER return precautions including palpitations, or fevers nausea vomiting chest pain abdominal pain, patient stable on discharge against medical advice, of note his EKG done at 3:51 p.m. showed AFib with RVR rate 120, no STEMI, repeat EKG done at 1822 shows AFib, rate 115, no STEMI                   Lizette Scott MD  09/26/18 1814       Lizette Scott MD  09/26/18 1815       Lizette Scott MD  09/26/18 1817

## 2018-09-27 ENCOUNTER — TELEPHONE (OUTPATIENT)
Dept: SURGERY | Facility: CLINIC | Age: 58
End: 2018-09-27

## 2018-09-27 ENCOUNTER — HOSPITAL ENCOUNTER (OUTPATIENT)
Dept: RADIOLOGY | Facility: HOSPITAL | Age: 58
Discharge: HOME | End: 2018-09-27
Attending: FAMILY MEDICINE
Payer: MEDICAID

## 2018-09-27 DIAGNOSIS — K46.9 HERNIA OF ABDOMINAL CAVITY: ICD-10-CM

## 2018-09-27 DIAGNOSIS — R10.84 ABDOMINAL PAIN, GENERALIZED: ICD-10-CM

## 2018-09-27 PROCEDURE — 76700 US EXAM ABDOM COMPLETE: CPT | Mod: 26,,, | Performed by: RADIOLOGY

## 2018-09-27 PROCEDURE — 76700 US EXAM ABDOM COMPLETE: CPT | Mod: TC

## 2018-09-27 NOTE — TELEPHONE ENCOUNTER
----- Message from Wilfred Mckeon sent at 9/27/2018 12:57 PM CDT -----  Type: Needs Medical Advice    Who Called:  Patient    Best Call Back Number: [phone]  Additional Information: Patient calling.  States that he has questions about his diagnosis and his appointment on 10/01.  Please call to advise

## 2018-09-27 NOTE — TELEPHONE ENCOUNTER
"Patient states he has had an ultrasound that was ordered by another doctor and he is being told that he has a hernia above his navel and it is"life threatening".  The doctor did not order a CT scan, he has been to the ER and was told it was a hernia.  Advised to keep his appointment on Monday, Dr León can review the u/s and possibly order a CT scan.  "

## 2018-10-01 ENCOUNTER — OFFICE VISIT (OUTPATIENT)
Dept: SURGERY | Facility: CLINIC | Age: 58
End: 2018-10-01
Payer: MEDICAID

## 2018-10-01 VITALS
HEIGHT: 67 IN | HEART RATE: 88 BPM | WEIGHT: 221.13 LBS | TEMPERATURE: 98 F | SYSTOLIC BLOOD PRESSURE: 134 MMHG | RESPIRATION RATE: 16 BRPM | BODY MASS INDEX: 34.71 KG/M2 | DIASTOLIC BLOOD PRESSURE: 81 MMHG

## 2018-10-01 DIAGNOSIS — K42.9 UMBILICAL HERNIA WITHOUT OBSTRUCTION AND WITHOUT GANGRENE: Primary | ICD-10-CM

## 2018-10-01 DIAGNOSIS — M62.08 RECTUS DIASTASIS: ICD-10-CM

## 2018-10-01 PROCEDURE — 99999 PR PBB SHADOW E&M-EST. PATIENT-LVL III: CPT | Mod: PBBFAC,,, | Performed by: SURGERY

## 2018-10-01 PROCEDURE — 99214 OFFICE O/P EST MOD 30 MIN: CPT | Mod: S$PBB,,, | Performed by: SURGERY

## 2018-10-01 PROCEDURE — 99213 OFFICE O/P EST LOW 20 MIN: CPT | Mod: PBBFAC,PO | Performed by: SURGERY

## 2018-10-01 RX ORDER — SODIUM CHLORIDE 9 MG/ML
INJECTION, SOLUTION INTRAVENOUS CONTINUOUS
Status: CANCELLED | OUTPATIENT
Start: 2018-10-10

## 2018-10-01 RX ORDER — LIDOCAINE HYDROCHLORIDE 10 MG/ML
1 INJECTION, SOLUTION EPIDURAL; INFILTRATION; INTRACAUDAL; PERINEURAL ONCE
Status: CANCELLED | OUTPATIENT
Start: 2018-10-10

## 2018-10-01 NOTE — PATIENT INSTRUCTIONS
PRE-OP INSTRUCTIONS    Your procedure has been scheduled at:    [facility]pre-admit nurse  [phone]      DAY  Wednesday    DATE  October 10, 2018    1:  Pre-admit nurse will go over your medicines and let you know which ones not to take the morning of surgery                                             2:  You will need to stop any blood thinners 1 week prior to surgery.  This includes Aspirin, fish oil, Pradaxa, Coumadin, Plavix, Pletal.  Please contact the prescribing doctor to be sure it is ok to stop these medicines.    3:  DO NOT EAT OR DRINK ANYTHING AFTER MIDNIGHT THE NIGHT BEFORE    4:  Plan to have someone drive you home after you are released from the hospital.  You WILL NOT be able to drive yourself.    5:  If you have any questions or need to change your surgery date, please call Tana at [phone]  You will be called the evening before between 3-6 PM with your arrival time    AFTER SURGERY:    You can shower 48 hours after surgery, REMOVE WET BANDAGES AND BANDAIDS, leave the steri- strips on.  If you have not had a bowel movement within 3 days after surgery you may take a laxative of your choice.  Do not lift anything over 5-10 pounds.    You need to have a follow up appointment 7-14 days after surgery, call the office to schedule or if you have questions or concerns.      Hernia (Adult)    A hernia can happen when there is a weakness or defect in the wall of the abdomen or groin. Intestines or nearby tissues may move from their usual location and push through the weakness in the wall. This can cause a hernia (bulge) you may see or feel.  Causes and Risk Factors   A hernia may be present at birth. Or it may be caused by the wear and tear of daily living. Certain factors can make a hernia more likely. These can include:  · Heavy lifting  · Straining, whether from lifting, movement, or constipation  · Chronic cough  · Injury to the abdominal wall  · Excess  weight  · Pregnancy  · Prior surgery  · Older age  · Family history of hernia  Symptoms  Symptoms of a hernia may come on suddenly. Or they may appear slowly over time. Some common symptoms include:  · Bulge in the groin area, around the navel, or in the scrotum (the bulge may get bigger when you stand and go away when you lie down)  · Pain or pressure around the bulge  · Pain during activities such as lifting, coughing, or sneezing  · A feeling of weakness or pressure in the groin  · Pain or swelling in the scrotum  Types of hernias  There are different types of hernia. The type you have depends on its location:  · Inguinal: This type is in the groin or scrotum. It is more common in men.  · Femoral: This type is in the groin, upper thigh (where the leg bends), or labia. It is more common in women.  · Ventral: This type is in the abdominal wall.  · Umbilical: This type occurs around the navel (belly button).  · Incisional: This type occurs at the site of a previous surgery.  The condition of the hernia can help determine how urgently it needs to be treated.  · Reducible: It goes back in by itself, or it can be pushed back in.  · Irreducible: It cant be pushed back in.  · Incarcerated/Strangulated: The intestine is trapped (incarcerated). If this happens, you wont be able to push the bulge back in. If the incarcerated hernia isnt treated, it may become strangulated. This means the area loses blood supply and the tissue may die. This requires emergency surgery! Treatment is needed right away!  In most cases, a hernia will not heal on its own. Surgery is usually needed to repair the defect in the abdominal wall or groin. Youll be told more about surgery, if needed.  If your symptoms are not severe, treatment may sometimes be delayed. In such cases, regular follow-up visits with the provider will be needed. Youll be asked to keep track of your symptoms and to watch for signs of more serious problems. You may also  be given guidelines similar to the home care instructions below.  Home Care  To help keep a hernia from getting worse, you may be advised to:  · Avoid heavy lifting and straining as directed.  · Take steps to prevent constipation, such as eating more fiber and drinking more water. This may help reduce straining that can occur when having a bowel movement. Reducing straining may help keep your symptoms from getting worse.  · Maintain a healthy weight or lose excess weight. This can help reduce strain on abdominal muscles and tissues.  · Stop smoking. This can help prevent coughing that may also strain abdominal muscles and tissues.  Follow-up care  Follow up with your healthcare provider, or as directed. If imaging tests were done, they will be reviewed a doctor. You will be told the results and any new findings that may affect your care.  When to seek medical advice  Call your healthcare provider right away if any of these occur:  · Hernia hardens, swells, or grows larger  · Hernia can no longer be pushed back in  · Pain moves to the lower right abdomen (just below the waistline), or spreads to the back  Call 911  Call 911 right away if any of these occur:  · Nausea and vomiting  · Severe pain, redness, or tenderness in the area near the hernia  · Pain worsens quickly and doesnt get better  · Inability to have a bowel movement or pass gas  · Fever of 100.4°F (38°C) or higher  · Trouble breathing  · Fainting  · Rapid heart rate  · Vomiting blood  · Large amounts of blood in stool  Date Last Reviewed: 6/9/2015  © 2705-0302 [employer]. [address] [place] [address], [place] [address]. All rights reserved. This information is not intended as a substitute for professional medical care. Always follow your healthcare professional's instructions.        Hernia Repair Surgery  A hernia (or rupture) is a weakness or defect in the wall of the abdomen. A hernia will not heal on its own. Surgery is needed to repair  the defect in the abdominal wall. If not treated, a hernia can get larger. It can also lead to serious health complications. Fortunately hernia surgery can be done quickly and safely. Below is an overview of hernia repair surgery.  Preparing for surgery  Your healthcare provider will talk with you about getting ready for surgery. Follow all the instructions youre given and be sure to:  · Tell your healthcare provider about any medicines, supplements, or herbs you take. This includes both prescription and over-the-counter items. Ask if you should stop taking any of them.  · Stop taking aspirin, ibuprofen, naproxen, and other NSAIDs 7 to 14 days before surgery.  · Arrange for an adult family member or friend to give you a ride home after surgery.  · Stop smoking. Smoking affects blood flow and can slow healing.  · Gently wash the surgical area the night before surgery.  · Follow any directions you are given for not eating or drinking before surgery.  The day of surgery  Arrive at the hospital or surgical center at your scheduled time. Youll be asked to change into a patient gown. Youll then be given an IV to provide fluids and medicine. Shortly before surgery, an anesthesiologist or nurse anesthetist will talk with you. He or she will explain the types of anesthesia used to prevent pain during surgery. You will have one or more of the following:  · Monitored sedation to make you relaxed and sleepy.  · Local anesthesia to numb the surgical site.  · Regional anesthesia to numb specific areas of your body.  · General anesthesia to let you sleep during surgery.  Fixing the weakness  Surgery treats a hernia by repairing the weakness in the abdominal wall. Most hernias are treated using tension-free repairs. This is surgery that uses special mesh materials to repair the weak area. The mesh covers the weak area like a patch. The mesh is made of strong, flexible plastic that stays in the body. Over time, nearby tissues  grow into the mesh to strengthen the repair.  After surgery  When the procedure is over, youll be taken to the recovery area to rest. Your blood pressure and heart rate will be monitored. Youll also have a bandage over the surgical site. To help reduce discomfort, youll be given pain medicines. You may also be given breathing exercises to keep your lungs clear. Later youll be asked to get up and walk. This helps prevent blood clots in the legs. You can go home when your healthcare provider says youre ready.     Risks and possible complications of hernia surgery  · Bleeding  · Infection  · Numbness or pain in the groin or leg  · Risk the hernia will recur  · Damage to the testicles or testicular function · Anesthesia risks  · Mesh complications  · Inability to urinate  · Bowel or bladder injury       Date Last Reviewed: 10/1/2016  © 0523-1782 [employer]. [address] [place] [address]. All rights reserved. This information is not intended as a substitute for professional medical care. Always follow your healthcare professional's instructions.        Discharge Instructions for Open Hernia Repair  You had a procedure called open hernia repair. Also called a rupture, a hernia is a tear or weakness in the wall of the belly. This weakness may be present at birth. Or it can be caused by the wear and tear of daily living. Hernias may get worse with time or with physical stress. But surgery can help repair the weakness and eliminate symptoms.  Activity after surgery  Recommendations include the following:  · After surgery, take it easy for the rest of the day. If you had general anesthesia, dont use machinery or power tools, drink alcohol, or make any major decisions for at least the first 24 hours.  · Dont drive while you are still taking opioid pain medicine, and dont drive until you are able to step firmly on the brake pedal without hesitation.  · Ask others to help with chores and  errands while you recover.  · Dont lift anything heavier than 10 pounds until your healthcare provider says its OK.  · Dont mow the lawn, use a vacuum , or do other strenuous activities until your healthcare provider says its OK.  · Walk as often as you feel able.  · Continue the coughing and deep breathing exercises that you learned in the hospital.  · Ask your healthcare provider when you can expect to return to work.  · Avoid constipation:  ¨ Eat fruits, vegetables, and whole grains.  ¨ Drink 6 to 8 glasses of water a day, unless otherwise instructed.  ¨ Use a laxative or a mild stool softener as instructed by your healthcare provider.  Bandage and incision care  Tips include the following:  · Do not get the bandage or wound wet for 48 hours.  · If strips of tape were used to close your incision, dont pull them off. Let them fall off on their own.  · Remove any gauze bandage in 48 hours.  · Wash your incision with mild soap and water. Pat it dry. Dont use oils, powders, or lotions on your incision. Do not soak your incision or take tub baths until cleared by your healthcare provider.  Follow-up care  Keep follow-up appointments during your recovery. These allow your healthcare provider to check your progress and make sure youre healing well. You may also need to have your stitches, staples, or bandage removed. During office visits, tell your healthcare provider if you have any new symptoms. And be sure to ask any questions you have.     When to call your healthcare provider  Call your healthcare provider immediately if you have any of the following:  · A large amount of swelling or bruising (some testicular swelling and bruising is common)  · Bleeding  · Increasing pain  · Increased redness or drainage of the incision  · Fever of 100.4°F (38.0°C) or higher, or as directed by your healthcare provider  · Trouble urinating  · Nausea or vomiting   Date Last Reviewed: 7/1/2016  © 6553-6042 [employer] [address] [place] [address], [place] [address]. All rights reserved. This information is not intended as a substitute for professional medical care. Always follow your healthcare professional's instructions.

## 2018-10-01 NOTE — PROGRESS NOTES
Subjective:       Patient ID: Timmy Wade is a 58 y.o. male.    Chief Complaint: Consult (hernia seen on u/s)    Patient presents for evaluation of possible HERNIA:      Timmy Wade is an 57 y.o. male who was referred for evaluation of a  ventral hernia. Symptoms were first noted 4 months ago. Symptoms or injury did not occur at work. Pain is dull, intermittent and is reducible. The patient has no symptoms of  chronic constipation, chronic cough, difficulty urinating. There does not have previous hx of  Surgery.    He was seen by me on  with a diastasis.  He now says there is swelling in the umbilicus.  Has US which showed a 1 cm hernia.    Past Medical History:  No date: Anxiety  No date: Atrial fibrillation  No date: Chronic back pain  Past Surgical History:  No date: CHOLECYSTECTOMY  No date: HERNIA REPAIR  No date: NASAL SEPTUM SURGERY  No date: RECTAL SURGERY      Current Outpatient Medications:   aspirin 325 MG tablet, Take 325 mg by mouth 2 (two) times daily., Disp: , Rfl:     Review of patient's allergies indicates:   -- Eggs (egg derived) -- Anaphylaxis   -- Ativan (lorazepam)     --  nausea   -- Corticosteroids (glucocorticoids) -- Palpitations    History reviewed.  No pertinent family history.    Social History    Socioeconomic History      Marital status:       Spouse name: Not on file      Number of children: Not on file      Years of education: Not on file      Highest education level: Not on file    Social Needs      Financial resource strain: Not on file      Food insecurity - worry: Not on file      Food insecurity - inability: Not on file      Transportation needs - medical: Not on file      Transportation needs - non-medical: Not on file    Occupational History      Not on file    Tobacco Use      Smoking status: Current Every Day Smoker        Packs/day: 0.50        Types: Cigarettes        Quit date: 2010        Years since quittin.8      Smokeless  tobacco: Never Used    Substance and Sexual Activity      Alcohol use: No      Drug use: No      Sexual activity: No    Other Topics      Concerns:        Not on file    Social History Narrative      Not on file             Review of Systems   Constitutional: Negative for activity change, appetite change, chills, fatigue, fever and unexpected weight change.   HENT: Negative for congestion, dental problem, hearing loss, nosebleeds, sinus pressure, sore throat and voice change.    Eyes: Negative for pain and visual disturbance.   Respiratory: Negative for cough, chest tightness and shortness of breath.    Cardiovascular: Negative for chest pain, palpitations and leg swelling.   Gastrointestinal: Negative for abdominal distention, abdominal pain, constipation, diarrhea, nausea and vomiting.   Endocrine: Negative for cold intolerance and heat intolerance.   Genitourinary: Negative for difficulty urinating, flank pain, frequency and hematuria.   Musculoskeletal: Negative for arthralgias, back pain, gait problem, joint swelling, myalgias, neck pain and neck stiffness.   Skin: Negative for rash.   Allergic/Immunologic: Negative for immunocompromised state.   Neurological: Negative for dizziness, tremors, seizures, syncope, weakness, light-headedness, numbness and headaches.   Hematological: Negative for adenopathy. Does not bruise/bleed easily.   Psychiatric/Behavioral: Negative for confusion, decreased concentration, hallucinations, sleep disturbance and suicidal ideas. The patient is not nervous/anxious.        Objective:      Physical Exam   Constitutional: He is oriented to person, place, and time. He appears well-developed and well-nourished.   HENT:   Head: Normocephalic and atraumatic.   Right Ear: External ear normal.   Left Ear: External ear normal.   Eyes: Conjunctivae are normal. Pupils are equal, round, and reactive to light.   Neck: Normal range of motion.   Cardiovascular: Normal rate and regular rhythm.    Pulmonary/Chest: Effort normal. No respiratory distress. He exhibits no tenderness.   Abdominal: Soft. He exhibits no distension and no mass.       Musculoskeletal: He exhibits no edema or tenderness.   Neurological: He is alert and oriented to person, place, and time. He has normal reflexes. No cranial nerve deficit.   Skin: Skin is warm and dry. No rash noted. No erythema. No pallor.   Psychiatric: He has a normal mood and affect. His behavior is normal. Judgment and thought content normal.   Nursing note and vitals reviewed.      Assessment:       1. Umbilical hernia without obstruction and without gangrene    2. Rectus diastasis        Plan:       For OP repair (open) on 10/10/18.  All aspects of procedure including risks and possible complications were discussed in detail with the patient who agrees to proceed with procedure.  All questions were answered and consent was obtained. Preop orders were written.

## 2018-10-08 ENCOUNTER — TELEPHONE (OUTPATIENT)
Dept: SURGERY | Facility: CLINIC | Age: 58
End: 2018-10-08

## 2018-10-08 NOTE — TELEPHONE ENCOUNTER
Advised that hospital policy will not allow patients to leave via cab, he will need a friend or family member. His insurance will not cover an overnight stay unless he has a complication.

## 2018-10-08 NOTE — TELEPHONE ENCOUNTER
----- Message from Annie Lindsey sent at 10/8/2018  1:56 PM CDT -----  Type: Needs Medical Advice    Who Called:  Alize/Patti Grace Call Back Number: [phone]  Additional Information: Needs to talk to office concerning patient's hernia surgery scheduled on 10/10/18. She wants to know if a  can pick him up after surgery and if patient can spend the night in the hospital since she cannot take care of him because she is disabled. Please call to advise.

## 2018-10-09 ENCOUNTER — TELEPHONE (OUTPATIENT)
Dept: SURGERY | Facility: CLINIC | Age: 58
End: 2018-10-09

## 2018-10-09 NOTE — TELEPHONE ENCOUNTER
----- Message from Keeley Padron sent at 10/9/2018 12:37 PM CDT -----  Type:  Patient Returning Call    Who Called:Patient  Who Left Message for Patient:  Tana  Does the patient know what this is regarding?:  yes  Best Call Back Number:  [phone] (home)     Additional Information:  Stating he has a surgery clearance to do this surgery, was advised it was cancelled

## 2018-10-09 NOTE — TELEPHONE ENCOUNTER
Advised patient that I have been unable to get in touch with Dr Ramos's office due to phone problems.  I did reach his [place] office but there was not a note regarding surgery clearance.  I will continue trying to call them tomorrow and as soon as we get clearance I an reschedule his surgery.

## 2018-10-09 NOTE — TELEPHONE ENCOUNTER
I spoke with patient, he states that he sees Dr Ramos and he saw him Monday, 10-8-18 and said that his EKG was fine and he could be cleared for surgery.  States he has paradoxical A-fib.  I am trying to reach Dr Ramos's [place] office but they are having phone trouble.  I called the [place] office and se gave me their fax number and also Monika and Ileana in an attempt to get him cleared for surgery and I can put him back on the schedule.

## 2018-10-10 ENCOUNTER — TELEPHONE (OUTPATIENT)
Dept: SURGERY | Facility: CLINIC | Age: 58
End: 2018-10-10

## 2018-10-10 NOTE — TELEPHONE ENCOUNTER
----- Message from Annie Lindsey sent at 10/10/2018  9:50 AM CDT -----  Type:  Patient Returning Call    Who Called:  Patient  Who Left Message for Patient:  DARRIN MUMTAZ  Does the patient know what this is regarding?:  Status of the Cardiologist  Best Call Back Number:  [phone]    He states he cannot see Dr. Ramos until the 10/18/18 for the referral.

## 2018-10-12 ENCOUNTER — TELEPHONE (OUTPATIENT)
Dept: SURGERY | Facility: CLINIC | Age: 58
End: 2018-10-12

## 2018-10-12 NOTE — TELEPHONE ENCOUNTER
I spoke with patient, he is seeing his cardiologist on 10-18-18 for surgical clearance, he will call back to reschedule the hernia repair after that appointment.

## 2018-10-13 ENCOUNTER — HOSPITAL ENCOUNTER (EMERGENCY)
Facility: HOSPITAL | Age: 58
Discharge: LEFT BEFORE DISCHARGE | End: 2018-10-13
Attending: EMERGENCY MEDICINE
Payer: MEDICAID

## 2018-10-13 VITALS
DIASTOLIC BLOOD PRESSURE: 72 MMHG | HEIGHT: 66 IN | HEART RATE: 108 BPM | RESPIRATION RATE: 21 BRPM | WEIGHT: 220 LBS | TEMPERATURE: 98 F | OXYGEN SATURATION: 95 % | SYSTOLIC BLOOD PRESSURE: 116 MMHG | BODY MASS INDEX: 35.36 KG/M2

## 2018-10-13 DIAGNOSIS — R00.2 PALPITATIONS: ICD-10-CM

## 2018-10-13 DIAGNOSIS — I48.91 ATRIAL FIBRILLATION: ICD-10-CM

## 2018-10-13 DIAGNOSIS — I48.91 ATRIAL FIBRILLATION, UNSPECIFIED TYPE: Primary | ICD-10-CM

## 2018-10-13 LAB
ALBUMIN SERPL BCP-MCNC: 3.8 G/DL
ALP SERPL-CCNC: 60 U/L
ALT SERPL W/O P-5'-P-CCNC: 24 U/L
AMPHET+METHAMPHET UR QL: NEGATIVE
ANION GAP SERPL CALC-SCNC: 11 MMOL/L
APTT BLDCRRT: 26.5 SEC
AST SERPL-CCNC: 18 U/L
BARBITURATES UR QL SCN>200 NG/ML: NEGATIVE
BASOPHILS # BLD AUTO: 0.03 K/UL
BASOPHILS NFR BLD: 0.3 %
BENZODIAZ UR QL SCN>200 NG/ML: NEGATIVE
BILIRUB SERPL-MCNC: 0.3 MG/DL
BNP SERPL-MCNC: 18 PG/ML
BUN SERPL-MCNC: 17 MG/DL
BZE UR QL SCN: NEGATIVE
CALCIUM SERPL-MCNC: 9.5 MG/DL
CANNABINOIDS UR QL SCN: NEGATIVE
CHLORIDE SERPL-SCNC: 107 MMOL/L
CO2 SERPL-SCNC: 23 MMOL/L
CREAT SERPL-MCNC: 0.9 MG/DL
CREAT UR-MCNC: 109.9 MG/DL
DIFFERENTIAL METHOD: NORMAL
EOSINOPHIL # BLD AUTO: 0.4 K/UL
EOSINOPHIL NFR BLD: 3.8 %
ERYTHROCYTE [DISTWIDTH] IN BLOOD BY AUTOMATED COUNT: 14.1 %
EST. GFR  (AFRICAN AMERICAN): >60 ML/MIN/1.73 M^2
EST. GFR  (NON AFRICAN AMERICAN): >60 ML/MIN/1.73 M^2
ETHANOL SERPL-MCNC: <10 MG/DL
GLUCOSE SERPL-MCNC: 117 MG/DL
HCT VFR BLD AUTO: 45.2 %
HGB BLD-MCNC: 15.8 G/DL
INR PPP: 1
LYMPHOCYTES # BLD AUTO: 3.8 K/UL
LYMPHOCYTES NFR BLD: 40.9 %
MCH RBC QN AUTO: 30.6 PG
MCHC RBC AUTO-ENTMCNC: 35 G/DL
MCV RBC AUTO: 87 FL
METHADONE UR QL SCN>300 NG/ML: NEGATIVE
MONOCYTES # BLD AUTO: 0.7 K/UL
MONOCYTES NFR BLD: 7.5 %
NEUTROPHILS # BLD AUTO: 4.4 K/UL
NEUTROPHILS NFR BLD: 47.5 %
OPIATES UR QL SCN: NEGATIVE
PCP UR QL SCN>25 NG/ML: NEGATIVE
PLATELET # BLD AUTO: 318 K/UL
PMV BLD AUTO: 9.9 FL
POTASSIUM SERPL-SCNC: 3.7 MMOL/L
PROT SERPL-MCNC: 7.2 G/DL
PROTHROMBIN TIME: 10 SEC
RBC # BLD AUTO: 5.17 M/UL
SODIUM SERPL-SCNC: 141 MMOL/L
TOXICOLOGY INFORMATION: NORMAL
TROPONIN I SERPL DL<=0.01 NG/ML-MCNC: <0.006 NG/ML
WBC # BLD AUTO: 9.21 K/UL

## 2018-10-13 PROCEDURE — 85025 COMPLETE CBC W/AUTO DIFF WBC: CPT

## 2018-10-13 PROCEDURE — 80053 COMPREHEN METABOLIC PANEL: CPT

## 2018-10-13 PROCEDURE — 99285 EMERGENCY DEPT VISIT HI MDM: CPT | Mod: 25

## 2018-10-13 PROCEDURE — 96374 THER/PROPH/DIAG INJ IV PUSH: CPT

## 2018-10-13 PROCEDURE — 93010 ELECTROCARDIOGRAM REPORT: CPT | Mod: ,,, | Performed by: INTERNAL MEDICINE

## 2018-10-13 PROCEDURE — 96361 HYDRATE IV INFUSION ADD-ON: CPT

## 2018-10-13 PROCEDURE — 84484 ASSAY OF TROPONIN QUANT: CPT

## 2018-10-13 PROCEDURE — 93005 ELECTROCARDIOGRAM TRACING: CPT

## 2018-10-13 PROCEDURE — 85610 PROTHROMBIN TIME: CPT

## 2018-10-13 PROCEDURE — 83880 ASSAY OF NATRIURETIC PEPTIDE: CPT

## 2018-10-13 PROCEDURE — 80307 DRUG TEST PRSMV CHEM ANLYZR: CPT

## 2018-10-13 PROCEDURE — 25000003 PHARM REV CODE 250: Performed by: EMERGENCY MEDICINE

## 2018-10-13 PROCEDURE — 85730 THROMBOPLASTIN TIME PARTIAL: CPT

## 2018-10-13 PROCEDURE — 80320 DRUG SCREEN QUANTALCOHOLS: CPT

## 2018-10-13 RX ORDER — DILTIAZEM HYDROCHLORIDE 5 MG/ML
20 INJECTION INTRAVENOUS
Status: COMPLETED | OUTPATIENT
Start: 2018-10-13 | End: 2018-10-13

## 2018-10-13 RX ORDER — DILTIAZEM HYDROCHLORIDE 120 MG/1
120 CAPSULE, EXTENDED RELEASE ORAL DAILY
Qty: 30 CAPSULE | Refills: 11 | Status: SHIPPED | OUTPATIENT
Start: 2018-10-13 | End: 2018-11-06 | Stop reason: SDUPTHER

## 2018-10-13 RX ORDER — DILTIAZEM HYDROCHLORIDE 120 MG/1
120 CAPSULE, COATED, EXTENDED RELEASE ORAL
Status: COMPLETED | OUTPATIENT
Start: 2018-10-13 | End: 2018-10-13

## 2018-10-13 RX ORDER — DILTIAZEM HYDROCHLORIDE 5 MG/ML
20 INJECTION INTRAVENOUS
Status: DISCONTINUED | OUTPATIENT
Start: 2018-10-13 | End: 2018-10-13

## 2018-10-13 RX ADMIN — DILTIAZEM HYDROCHLORIDE 120 MG: 120 CAPSULE, COATED, EXTENDED RELEASE ORAL at 06:10

## 2018-10-13 RX ADMIN — SODIUM CHLORIDE 1000 ML: 0.9 INJECTION, SOLUTION INTRAVENOUS at 06:10

## 2018-10-13 RX ADMIN — DILTIAZEM HYDROCHLORIDE 20 MG: 5 INJECTION INTRAVENOUS at 06:10

## 2018-10-13 NOTE — ED PROVIDER NOTES
"Encounter Date: 10/13/2018       History     Chief Complaint   Patient presents with    Palpitations     pt has A Fib, c/o of fast heart rate     This is an emergent evaluation of a 58 y.o. Male with a history of afib who presents with palpitations. Patient states, "it's my afib." He reports he was eating ice cream this afternoon and the cold of the ice cream hit the site of a recent dental extraction and the pain set off his palpitations. Patient reports intermittent afib episodes over the last few years. He attributes this to withdrawal from Klonopin, though he has not had this medication in over 2 years; he had been rx'ed this by a pain management doctor for his sciatica. He is currently not on any medications, though he has been rx'ed Diltiazem for his afib in the past, but does not like it because it makes him tired. Patient denies chest pain, diaphoresis, nausea, recent travel/immobilization, or other complaint. Patient states he has been unable to follow up with a cardiologist because he is on Medicaid. He states he has an appointment with a cardiologist in [place] on 10/18, in about 5 days.     Per Epic: Patient has several prior visits for afib with RVR, most recently 9/26/18. First visit to our system for afib with RVR was in 2012. Though patient claims that he reverts to NSR between afib visits, it is difficult to ascertain whether this is accurate from the medical record.    From [place], [place].     Echo 4/9/17  CONCLUSIONS     1 - Normal left ventricular systolic function (EF 55-60%).  Normal wall motion.     2 - Mildly enlarged aortic root, 3.8 cm..     3 - Trivial mitral regurgitation.     4 - Trivial tricuspid regurgitation.     5 - The estimated PA systolic pressure is 28 mmHg.     PMH: afib, chronic back pain, anxiety  PSH: cholecystectomy, hernia repair, rectum surgery, spermatocele surgery, nasal septum surgery    Smokes tobacco.          Review of patient's allergies indicates:   Allergen " Reactions    Eggs [egg derived] Anaphylaxis    Ativan [lorazepam]      nausea    Corticosteroids (glucocorticoids) Palpitations     Past Medical History:   Diagnosis Date    Anxiety     Atrial fibrillation     Chronic back pain      Past Surgical History:   Procedure Laterality Date    CHOLECYSTECTOMY      HERNIA REPAIR      NASAL SEPTUM SURGERY      RECTAL SURGERY       History reviewed. No pertinent family history.  Social History     Tobacco Use    Smoking status: Current Every Day Smoker     Packs/day: 0.50     Types: Cigarettes     Last attempt to quit: 2010     Years since quittin.8    Smokeless tobacco: Never Used   Substance Use Topics    Alcohol use: No    Drug use: No     Review of Systems   Constitutional: Negative for diaphoresis.   HENT: Negative for rhinorrhea.    Eyes: Negative for visual disturbance.   Respiratory: Negative for cough and shortness of breath.    Cardiovascular: Positive for palpitations. Negative for chest pain.   Gastrointestinal: Negative for abdominal pain.   Genitourinary: Negative for dysuria.   Musculoskeletal: Negative for neck stiffness.   Skin: Negative for rash.   Neurological: Negative for syncope.       Physical Exam     Initial Vitals [10/13/18 1730]   BP Pulse Resp Temp SpO2   115/81 (!) 120 20 98 °F (36.7 °C) 97 %      MAP       --         Physical Exam    Nursing note and vitals reviewed.  Constitutional: He appears well-developed and well-nourished. He is not diaphoretic.   Nontoxic-appearing middle-aged male. Speaking easily in complete sentences without evident shortness of breath. Obese.   HENT:   Head: Normocephalic and atraumatic.   Mouth/Throat: Oropharynx is clear and moist.   Eyes: Conjunctivae and EOM are normal. Pupils are equal, round, and reactive to light.   Neck: Normal range of motion. Neck supple.   Cardiovascular: Normal heart sounds and intact distal pulses.   No murmur heard.  Irregularly irregular tachycardia.    Pulmonary/Chest: No respiratory distress. He has no wheezes. He has no rhonchi. He has rales (minimal bibasilar, ?poor effort).   Abdominal: Soft. Bowel sounds are normal. He exhibits no distension. There is no tenderness. There is no rebound and no guarding.   Musculoskeletal: Normal range of motion. He exhibits edema (trace to ankles). He exhibits no tenderness.   Neurological: He is alert and oriented to person, place, and time. He has normal strength.   Moving all extremities   Skin: Skin is warm and dry.   Psychiatric: He has a normal mood and affect.         ED Course   Procedures  Labs Reviewed   COMPREHENSIVE METABOLIC PANEL - Abnormal; Notable for the following components:       Result Value    Glucose 117 (*)     All other components within normal limits   B-TYPE NATRIURETIC PEPTIDE   CBC W/ AUTO DIFFERENTIAL   DRUG SCREEN PANEL, URINE EMERGENCY   PROTIME-INR   TROPONIN I   APTT   ALCOHOL,MEDICAL (ETHANOL)     EKG Readings: (Independently Interpreted)   EMS, 16:59: Afib with RVR, . L axis. RBBB. No STEMI.   ED, 17:44: Afib with RVR, . L axis. RBBB. No STEMI.  ED, 19:34: Afib with RVR, . L axis. RBBB. No STEMI.  Morphology all c/w 9/26/18.       Imaging Results          X-Ray Chest AP Portable (Final result)  Result time 10/13/18 18:31:34    Final result by Faustino Han MD (10/13/18 18:31:34)                 Impression:      No acute cardiopulmonary process identified.      Electronically signed by: Faustino Han MD  Date:    10/13/2018  Time:    18:31             Narrative:    EXAMINATION:  XR CHEST AP PORTABLE    CLINICAL HISTORY:  palpitations;    TECHNIQUE:  Single frontal view of the chest was performed.    COMPARISON:  09/26/2018.    FINDINGS:  Cardiac silhouette is normal in size.  Lungs are symmetrically expanded.  No evidence of focal consolidative process, pneumothorax, or significant effusion.  No acute osseous abnormality identified.                              X-Rays:    Independently Interpreted Readings:   Other Readings:  CXR NAD    Medical Decision Making:   History:   Old Medical Records: I decided to obtain old medical records.  Old Records Summarized: records from clinic visits, records from previous admission(s) and records from another hospital.  Initial Assessment:   58 y.o. Male with palpitations.  Differential Diagnosis:   Ddx includes dysrhythmia, symptomatic anemia, ACS, CHF, other.  Independently Interpreted Test(s):   I have ordered and independently interpreted X-rays - see prior notes.  I have ordered and independently interpreted EKG Reading(s) - see prior notes  Clinical Tests:   Lab Tests: Ordered and Reviewed  Radiological Study: Ordered and Reviewed  Medical Tests: Reviewed and Ordered  ED Management:  EKG shows afib with RVR.    CXR NAD.    Labs reassuring.    Patient's HR slowed with PO and IV diltiazem and IV NS but he did not convert to NSR. He stated he felt better and wanted to leave. I have advised him to take cardizem daily rather than PRN and to f/u to cardiology in [place]. I have rx'ed diltiziam. Patient signed AMA and left ED in [facility].                       Clinical Impression:   The primary encounter diagnosis was Atrial fibrillation, unspecified type. Diagnoses of Palpitations and Atrial fibrillation were also pertinent to this visit.                             Jessica Ruiz MD  10/14/18 0539

## 2018-10-13 NOTE — ED TRIAGE NOTES
Pt reports he was eating icecream and began having heart palpitations about an hour prior to arrival.  Pt states he has a history of A fib.  Denies chest pain, sob, n/v/d.  Placed on cardiac monitor, bp cuff, and pulse ox.  VSS, does not appear to be in acute distress, will continue to monitor.

## 2018-10-14 NOTE — ED NOTES
Patient reports feeling that he is out of a fib and is ready to go home. States he doesn't think he needs any more medication. MD will be notified.

## 2018-10-28 ENCOUNTER — HOSPITAL ENCOUNTER (EMERGENCY)
Facility: HOSPITAL | Age: 58
Discharge: HOME | End: 2018-10-28
Attending: EMERGENCY MEDICINE | Admitting: EMERGENCY MEDICINE
Payer: MEDICAID

## 2018-10-28 VITALS
SYSTOLIC BLOOD PRESSURE: 134 MMHG | OXYGEN SATURATION: 97 % | HEIGHT: 66 IN | WEIGHT: 220 LBS | BODY MASS INDEX: 35.36 KG/M2 | HEART RATE: 76 BPM | TEMPERATURE: 98 F | RESPIRATION RATE: 14 BRPM | DIASTOLIC BLOOD PRESSURE: 85 MMHG

## 2018-10-28 DIAGNOSIS — K08.89 PAIN, DENTAL: Primary | ICD-10-CM

## 2018-10-28 PROCEDURE — 99284 EMERGENCY DEPT VISIT MOD MDM: CPT

## 2018-10-28 RX ORDER — SULINDAC 150 MG/1
150 TABLET ORAL 2 TIMES DAILY
Qty: 10 TABLET | Refills: 0 | Status: ON HOLD | OUTPATIENT
Start: 2018-10-28 | End: 2021-09-08 | Stop reason: HOSPADM

## 2018-10-28 RX ORDER — PENICILLIN V POTASSIUM 250 MG/1
250 TABLET, FILM COATED ORAL EVERY 6 HOURS
Qty: 28 TABLET | Refills: 0 | Status: SHIPPED | OUTPATIENT
Start: 2018-10-28 | End: 2018-11-04

## 2018-10-29 NOTE — ED PROVIDER NOTES
"Encounter Date: 10/28/2018       History     Chief Complaint   Patient presents with    Dental Pain     Pt reports he had a tooth removed "Upper right" and root was left in.  Reports very painful.  Reports taking a pain medication about noon, denies relief.        The history is provided by the patient and medical records. No  was used.   Dental Pain   The primary symptoms include mouth pain. Primary symptoms do not include fever, shortness of breath, sore throat or cough. Primary symptoms comment: following recent tooth extraction. Illness onset: 3d. The symptoms are worsening. The symptoms are new. The symptoms occur intermittently.   Location of mouth pain: right upper. At its highest the mouth pain was at 8/10. The mouth pain is currently at 8/10.   Additional symptoms do not include: trismus, facial swelling, trouble swallowing, pain with swallowing, excessive salivation, taste disturbance, smell disturbance, drooling, ear pain and fatigue.     Review of patient's allergies indicates:   Allergen Reactions    Eggs [egg derived] Anaphylaxis    Ativan [lorazepam]      nausea    Corticosteroids (glucocorticoids) Palpitations     Past Medical History:   Diagnosis Date    Anxiety     Atrial fibrillation     Chronic back pain      Past Surgical History:   Procedure Laterality Date    CHOLECYSTECTOMY      HERNIA REPAIR      NASAL SEPTUM SURGERY      RECTAL SURGERY       History reviewed. No pertinent family history.  Social History     Tobacco Use    Smoking status: Current Every Day Smoker     Packs/day: 0.50     Types: Cigarettes     Last attempt to quit: 2010     Years since quittin.8    Smokeless tobacco: Never Used   Substance Use Topics    Alcohol use: No    Drug use: No     Review of Systems   Constitutional: Negative for appetite change, chills, diaphoresis, fatigue and fever.   HENT: Positive for dental problem. Negative for congestion, drooling, ear discharge, ear " pain, facial swelling, postnasal drip, rhinorrhea, sinus pressure, sneezing, sore throat, trouble swallowing and voice change.    Eyes: Negative for discharge, itching and visual disturbance.   Respiratory: Negative for cough, shortness of breath and wheezing.    Cardiovascular: Negative for chest pain, palpitations and leg swelling.   Gastrointestinal: Negative for abdominal pain, nausea and vomiting.   Endocrine: Negative for polydipsia, polyphagia and polyuria.   Genitourinary: Negative for difficulty urinating, discharge, dysuria, frequency, hematuria, penile pain, penile swelling and urgency.   Musculoskeletal: Negative for arthralgias and myalgias.   Skin: Negative for rash and wound.   Neurological: Negative for dizziness, seizures, syncope and weakness.   Hematological: Negative for adenopathy. Does not bruise/bleed easily.   Psychiatric/Behavioral: Negative for agitation and self-injury. The patient is not nervous/anxious.        Physical Exam     Initial Vitals [10/28/18 2133]   BP Pulse Resp Temp SpO2   134/85 76 14 98.1 °F (36.7 °C) 97 %      MAP       --         Physical Exam    Nursing note and vitals reviewed.  Constitutional: He appears well-developed and well-nourished. He is not diaphoretic. No distress.   HENT:   Head: Normocephalic and atraumatic.   Right Ear: External ear normal.   Left Ear: External ear normal.   Nose: Nose normal.   Mouth/Throat: Abnormal dentition. Dental caries present. No dental abscesses.   Eyes: Pupils are equal, round, and reactive to light. Right eye exhibits no discharge. Left eye exhibits no discharge. No scleral icterus.   Neck: Normal range of motion.   Pulmonary/Chest: No respiratory distress.   Abdominal: He exhibits no distension.   Musculoskeletal: Normal range of motion.   Neurological: He is alert and oriented to person, place, and time.   Skin: Skin is dry. Capillary refill takes less than 2 seconds.         ED Course   Procedures  Labs Reviewed - No data to  display       Imaging Results    None                APC / Resident Notes:   This is an evaluation of a 58 y.o. male that presents to the Emergency Department for dental pain. The patient reports no fever, chills, nausea, vomiting, or inability to open mouth. Physical Exam shows a non-toxic, afebrile, and well appearing male with overall poor dentition with multiple dental carries\cavities noted. There is no facial swelling. There is no visible oral drainable abscess at this time. He has no facial cellulitis, oral swelling, airway compromise, sublingual swelling, or trismus. There is no gingival erythema on the facial and lingual surfaces surrounding the tooth.The neck is soft and supple.     Vital signs are reassuring.     My overall impression is dental pain. I considered, but at this time, do not suspect John's angina, deep space infection, peritonsillar infection, pharyngitis, mastoiditis, or a facial cellulitis.    ED Course: pt declined pain meds and first dose of antibiotics here. D/C Meds: pen v k 250mg po q6h x7d, clinoril 150mg po bid. Additional D/C Information: dental community resource sheet. The diagnosis, treatment plan, instructions for follow-up and reevaluation with a dentist as well as ED return precautions were discussed and understanding was verbalized. All questions or concerns have been addressed.                    Clinical Impression:   The encounter diagnosis was Pain, dental.      Disposition:   Disposition: Discharged  Condition: Stable                        Horace Reaves, ADIA  10/29/18 0527

## 2018-10-29 NOTE — ED TRIAGE NOTES
"Pt reports he had a tooth removed "Upper right" and root was left in. Reports very painful. Reports taking a pain medication about noon, denies relief.   "

## 2018-11-06 ENCOUNTER — HOSPITAL ENCOUNTER (EMERGENCY)
Facility: HOSPITAL | Age: 58
Discharge: HOME | End: 2018-11-06
Attending: EMERGENCY MEDICINE
Payer: MEDICAID

## 2018-11-06 VITALS
HEIGHT: 66 IN | OXYGEN SATURATION: 96 % | BODY MASS INDEX: 35.36 KG/M2 | RESPIRATION RATE: 20 BRPM | HEART RATE: 121 BPM | WEIGHT: 220 LBS | DIASTOLIC BLOOD PRESSURE: 74 MMHG | SYSTOLIC BLOOD PRESSURE: 119 MMHG

## 2018-11-06 DIAGNOSIS — I48.91 ATRIAL FIBRILLATION: Primary | ICD-10-CM

## 2018-11-06 DIAGNOSIS — R00.2 PALPITATIONS: ICD-10-CM

## 2018-11-06 LAB
ALBUMIN SERPL BCP-MCNC: 3.6 G/DL
ALP SERPL-CCNC: 52 U/L
ALT SERPL W/O P-5'-P-CCNC: 21 U/L
ANION GAP SERPL CALC-SCNC: 11 MMOL/L
APTT BLDCRRT: 28.6 SEC
AST SERPL-CCNC: 20 U/L
BASOPHILS # BLD AUTO: 0.04 K/UL
BASOPHILS NFR BLD: 0.4 %
BILIRUB SERPL-MCNC: 0.5 MG/DL
BNP SERPL-MCNC: 13 PG/ML
BUN SERPL-MCNC: 19 MG/DL
CALCIUM SERPL-MCNC: 9.7 MG/DL
CHLORIDE SERPL-SCNC: 107 MMOL/L
CO2 SERPL-SCNC: 24 MMOL/L
CREAT SERPL-MCNC: 0.8 MG/DL
D DIMER PPP IA.FEU-MCNC: 0.2 MG/L FEU
DIFFERENTIAL METHOD: NORMAL
EOSINOPHIL # BLD AUTO: 0.4 K/UL
EOSINOPHIL NFR BLD: 3.9 %
ERYTHROCYTE [DISTWIDTH] IN BLOOD BY AUTOMATED COUNT: 13.9 %
EST. GFR  (AFRICAN AMERICAN): >60 ML/MIN/1.73 M^2
EST. GFR  (NON AFRICAN AMERICAN): >60 ML/MIN/1.73 M^2
GLUCOSE SERPL-MCNC: 126 MG/DL
HCT VFR BLD AUTO: 46.8 %
HGB BLD-MCNC: 16.2 G/DL
INR PPP: 1
LYMPHOCYTES # BLD AUTO: 4.4 K/UL
LYMPHOCYTES NFR BLD: 43.1 %
MCH RBC QN AUTO: 30.4 PG
MCHC RBC AUTO-ENTMCNC: 34.6 G/DL
MCV RBC AUTO: 88 FL
MONOCYTES # BLD AUTO: 0.6 K/UL
MONOCYTES NFR BLD: 6.1 %
NEUTROPHILS # BLD AUTO: 4.7 K/UL
NEUTROPHILS NFR BLD: 46.5 %
PLATELET # BLD AUTO: 289 K/UL
PMV BLD AUTO: 9.5 FL
POTASSIUM SERPL-SCNC: 3.8 MMOL/L
PROT SERPL-MCNC: 6.7 G/DL
PROTHROMBIN TIME: 10.2 SEC
RBC # BLD AUTO: 5.33 M/UL
SODIUM SERPL-SCNC: 142 MMOL/L
TROPONIN I SERPL DL<=0.01 NG/ML-MCNC: 0.01 NG/ML
TROPONIN I SERPL DL<=0.01 NG/ML-MCNC: 0.01 NG/ML
WBC # BLD AUTO: 10.09 K/UL

## 2018-11-06 PROCEDURE — 85610 PROTHROMBIN TIME: CPT

## 2018-11-06 PROCEDURE — 85379 FIBRIN DEGRADATION QUANT: CPT

## 2018-11-06 PROCEDURE — 25000003 PHARM REV CODE 250: Performed by: EMERGENCY MEDICINE

## 2018-11-06 PROCEDURE — 85025 COMPLETE CBC W/AUTO DIFF WBC: CPT

## 2018-11-06 PROCEDURE — 84484 ASSAY OF TROPONIN QUANT: CPT

## 2018-11-06 PROCEDURE — 85730 THROMBOPLASTIN TIME PARTIAL: CPT

## 2018-11-06 PROCEDURE — 96374 THER/PROPH/DIAG INJ IV PUSH: CPT

## 2018-11-06 PROCEDURE — 99285 EMERGENCY DEPT VISIT HI MDM: CPT | Mod: 25

## 2018-11-06 PROCEDURE — 80053 COMPREHEN METABOLIC PANEL: CPT

## 2018-11-06 PROCEDURE — 93010 ELECTROCARDIOGRAM REPORT: CPT | Mod: ,,, | Performed by: INTERNAL MEDICINE

## 2018-11-06 PROCEDURE — 93005 ELECTROCARDIOGRAM TRACING: CPT

## 2018-11-06 PROCEDURE — 83880 ASSAY OF NATRIURETIC PEPTIDE: CPT

## 2018-11-06 RX ORDER — DILTIAZEM HYDROCHLORIDE 120 MG/1
120 CAPSULE, COATED, EXTENDED RELEASE ORAL
Status: COMPLETED | OUTPATIENT
Start: 2018-11-06 | End: 2018-11-06

## 2018-11-06 RX ORDER — DILTIAZEM HYDROCHLORIDE 120 MG/1
120 CAPSULE, EXTENDED RELEASE ORAL DAILY
Qty: 30 CAPSULE | Refills: 11 | Status: SHIPPED | OUTPATIENT
Start: 2018-11-06 | End: 2018-12-16 | Stop reason: SDUPTHER

## 2018-11-06 RX ORDER — DILTIAZEM HYDROCHLORIDE 5 MG/ML
20 INJECTION INTRAVENOUS
Status: COMPLETED | OUTPATIENT
Start: 2018-11-06 | End: 2018-11-06

## 2018-11-06 RX ORDER — DILTIAZEM HYDROCHLORIDE 5 MG/ML
20 INJECTION INTRAVENOUS
Status: DISCONTINUED | OUTPATIENT
Start: 2018-11-06 | End: 2018-11-06 | Stop reason: HOSPADM

## 2018-11-06 RX ADMIN — SODIUM CHLORIDE 1000 ML: 0.9 INJECTION, SOLUTION INTRAVENOUS at 02:11

## 2018-11-06 RX ADMIN — DILTIAZEM HYDROCHLORIDE 20 MG: 5 INJECTION INTRAVENOUS at 02:11

## 2018-11-06 RX ADMIN — DILTIAZEM HYDROCHLORIDE 120 MG: 120 CAPSULE, COATED, EXTENDED RELEASE ORAL at 02:11

## 2018-11-06 NOTE — ED PROVIDER NOTES
"Encounter Date: 11/6/2018    SCRIBE #1 NOTE: I, Fabi Rowan, am scribing for, and in the presence of,  Jessica Ruiz MD. I have scribed the following portions of the note - Other sections scribed: HPI, ROS, PE.       History     Chief Complaint   Patient presents with    Palpitations     x couple of hours, took 325 asa, denies CP at this time or with EMS just reports feeling palpitations, hx of a fib, a fib with EMS     CC: Palpitations    HPI: This is an emergent evaluation of a 57 y/o male with atrial fibrillation who presents to the ED via EMS for palpations. Pt states, "I woke up with afib." Pt notes that afib started at 1700 yesterday, resolved on own, then came back around 0100 today. Pt took ASA 325mg PO PTA. He denies chest pain. Pt has been rx'ed diltiazem for afib in the past multiple times, but does not take it because he feels he reverts to NSR between attacks. Pt reports excercising more than usual today, but also states that he does not exercise regularly. He was asymptomatic during exercise.    Pt states that he saw his cardiologist, Dr. Murray at [facility], today for an echocardiogram that was reported as unremarkable. Patient notes that he has an upcoming stress test.    Per Epic: Patient has several prior visits for afib with RVR, most recently 9/26/18 (7 weeks ago) and 10/13/18 (3 weeks ago). First visit to our system for afib with RVR was in 2012. Though patient claims that he reverts to NSR between afib visits, it is difficult to ascertain whether this is accurate from the medical record.    From [place], [place].      Echo 4/9/17  CONCLUSIONS     1 - Normal left ventricular systolic function (EF 55-60%).  Normal wall motion.     2 - Mildly enlarged aortic root, 3.8 cm..     3 - Trivial mitral regurgitation.     4 - Trivial tricuspid regurgitation.     5 - The estimated PA systolic pressure is 28 mmHg.      PMH: afib, chronic back pain, anxiety  PSH: cholecystectomy, " hernia repair, rectum surgery, spermatocele surgery, nasal septum surgery     Smokes tobacco.      The history is provided by the patient. No  was used.     Review of patient's allergies indicates:   Allergen Reactions    Eggs [egg derived] Anaphylaxis    Ativan [lorazepam]      nausea    Corticosteroids (glucocorticoids) Palpitations     Past Medical History:   Diagnosis Date    Anxiety     Atrial fibrillation     Chronic back pain      Past Surgical History:   Procedure Laterality Date    CHOLECYSTECTOMY      HERNIA REPAIR      NASAL SEPTUM SURGERY      RECTAL SURGERY       History reviewed. No pertinent family history.  Social History     Tobacco Use    Smoking status: Current Every Day Smoker     Packs/day: 0.50     Types: Cigarettes     Last attempt to quit: 2010     Years since quittin.9    Smokeless tobacco: Never Used   Substance Use Topics    Alcohol use: No    Drug use: No     Review of Systems   Constitutional: Negative for diaphoresis and fever.   HENT: Negative for rhinorrhea and sore throat.    Eyes: Negative for visual disturbance.   Respiratory: Negative for cough and shortness of breath.    Cardiovascular: Positive for palpitations. Negative for chest pain and leg swelling.   Gastrointestinal: Negative for abdominal pain.   Genitourinary: Negative for dysuria.   Musculoskeletal: Negative for gait problem.   Skin: Negative for rash.   Neurological: Negative for syncope.       Physical Exam     Initial Vitals [18 0156]   BP Pulse Resp Temp SpO2   120/76 (!) 120 20 -- 97 %      MAP       --         Physical Exam    Nursing note and vitals reviewed.  Constitutional: He appears well-developed and well-nourished. He is not diaphoretic.   Nontoxic-appearing middle-aged male. Speaking easily in complete sentences without evident shortness of breath. Obese.    HENT:   Head: Normocephalic and atraumatic.   Mouth/Throat: Oropharynx is clear and moist.   Eyes:  Conjunctivae and EOM are normal. Pupils are equal, round, and reactive to light.   Neck: Normal range of motion. Neck supple.   Cardiovascular: Normal heart sounds and intact distal pulses. An irregularly irregular rhythm present.  Tachycardia present.    No murmur heard.  Pulmonary/Chest: No respiratory distress. He has no wheezes. He has no rhonchi. He has rales (minimal bibasilar, ?poor effort).   Abdominal: Soft. Bowel sounds are normal. He exhibits no distension. There is no tenderness. There is no rebound and no guarding.   Musculoskeletal: Normal range of motion. He exhibits edema (trace to ankles). He exhibits no tenderness.   Neurological: He is alert and oriented to person, place, and time. He has normal strength.   Moving all extremities   Skin: Skin is warm and dry.   Psychiatric: He has a normal mood and affect.         ED Course   Critical Care  Date/Time: 11/6/2018 4:45 AM  Performed by: Jessica Ruiz MD  Authorized by: Jessica Ruiz MD   Direct patient critical care time: 10 minutes  Additional history critical care time: 10 minutes  Ordering / reviewing critical care time: 10 minutes  Documentation critical care time: 10 minutes  Total critical care time (exclusive of procedural time) : 40 minutes        Labs Reviewed   COMPREHENSIVE METABOLIC PANEL - Abnormal; Notable for the following components:       Result Value    Glucose 126 (*)     Alkaline Phosphatase 52 (*)     All other components within normal limits   CBC W/ AUTO DIFFERENTIAL   B-TYPE NATRIURETIC PEPTIDE   D DIMER, QUANTITATIVE   APTT   PROTIME-INR   TROPONIN I   TROPONIN I     EKG Readings: (Independently Interpreted)   EMS, 01:25: Afib with RVR, . RBBB. No STEMI.  ED, 02:05: Afib with RVR, . RBBB. Low voltage. No STEMI.  ED, 03:51: Afib, HR 90. RBBB. Low voltage. No STEMI.        Imaging Results          X-Ray Chest AP Portable (Final result)  Result time 11/06/18 02:45:08    Final result by Heidi Jacinto MD  (11/06/18 02:45:08)                 Impression:      No acute intrathoracic abnormality identified on this single radiographic view of the chest.      Electronically signed by: Heidi Jacinto MD  Date:    11/06/2018  Time:    02:45             Narrative:    EXAMINATION:  XR CHEST AP PORTABLE    CLINICAL HISTORY:  palpitations;    TECHNIQUE:  Single frontal view of the chest was performed.    COMPARISON:  Chest radiograph 10/13/2018    FINDINGS:  Monitoring leads overlie the chest.  The cardiomediastinal silhouette is within normal limits. The visualized airway is unremarkable.  The lungs appear symmetrically aerated without definite focal alveolar consolidation. No large pleural effusion or pneumothorax is appreciated.Visualized osseous structures demonstrate no acute abnormality.                              X-Rays:   Independently Interpreted Readings:   Other Readings:  CXR NAD    Medical Decision Making:   History:   Old Medical Records: I decided to obtain old medical records.  Old Records Summarized: records from previous admission(s).  Initial Assessment:   58 y.o. Male with multiple ED visits for afib with RVR presents with palpitations.  Differential Diagnosis:   Ddx includes dysrhythmia, PE, ACS, CHF, symptomatic anemia, other.  Independently Interpreted Test(s):   I have ordered and independently interpreted X-rays - see prior notes.  I have ordered and independently interpreted EKG Reading(s) - see prior notes  Clinical Tests:   Lab Tests: Ordered and Reviewed  Radiological Study: Ordered and Reviewed  Medical Tests: Ordered and Reviewed  ED Management:  EKG shows afib with RVR. Morphology c/w prior. No STEMI.    CXR NAD.    Labs: CBC, CMP, troponin, BNP reassuring.     Patient had IV NS 1L bolus and IV cardizem 20mg and PO cardizem ER 120mg. This initially controlled the patient's HR from the 130s to 90s. However, HR then increased back to the 110s. I planned to give the patient another dose of IV  cardizem and OBS for cardiology. However, patient stated he did not want other medication and felt well to go home.     RN and I explained that due to persistent afib with RVR by monitoring, discharge is unwise. Uncontrolled afib can lead to stroke, PE, other embolic event, or other adverse outcome. Patient signed AMA. I did rx cardizem again and advise close cardiology f/u.             Scribe Attestation:   Scribe #1: I performed the above scribed service and the documentation accurately describes the services I performed. I attest to the accuracy of the note.    Attending Attestation:           Physician Attestation for Scribe:  Physician Attestation Statement for Scribe #1: I, Jessica Ruiz MD, reviewed documentation, as scribed by Fabi Rowan in my presence, and it is both accurate and complete.                    Clinical Impression:   The primary encounter diagnosis was Atrial fibrillation. A diagnosis of Palpitations was also pertinent to this visit.                             Jessica Ruiz MD  11/06/18 [id number]

## 2018-11-06 NOTE — ED TRIAGE NOTES
Patient presents to the ER via EMS. Patient presents with A fib. Reports having chest palpitations that started around 0100. Reports not having any symptoms now. Denies chest pain, SOB, n/v/d. Denies pain

## 2018-12-16 ENCOUNTER — HOSPITAL ENCOUNTER (EMERGENCY)
Facility: HOSPITAL | Age: 58
Discharge: HOME | End: 2018-12-16
Attending: INTERNAL MEDICINE
Payer: MEDICAID

## 2018-12-16 VITALS
TEMPERATURE: 98 F | HEART RATE: 98 BPM | WEIGHT: 220 LBS | RESPIRATION RATE: 16 BRPM | DIASTOLIC BLOOD PRESSURE: 84 MMHG | BODY MASS INDEX: 35.36 KG/M2 | OXYGEN SATURATION: 95 % | HEIGHT: 66 IN | SYSTOLIC BLOOD PRESSURE: 107 MMHG

## 2018-12-16 DIAGNOSIS — R00.2 PALPITATIONS: ICD-10-CM

## 2018-12-16 DIAGNOSIS — K08.89 TOOTHACHE: ICD-10-CM

## 2018-12-16 DIAGNOSIS — I48.91 ATRIAL FIBRILLATION, UNSPECIFIED TYPE: Primary | ICD-10-CM

## 2018-12-16 DIAGNOSIS — I48.91 ATRIAL FIBRILLATION: ICD-10-CM

## 2018-12-16 LAB
ALBUMIN SERPL-MCNC: 3.6 G/DL (ref 3.3–5.5)
ALP SERPL-CCNC: 59 U/L (ref 42–141)
BILIRUB SERPL-MCNC: 0.7 MG/DL (ref 0.2–1.6)
BUN SERPL-MCNC: 16 MG/DL (ref 7–22)
CALCIUM SERPL-MCNC: 9.5 MG/DL (ref 8–10.3)
CHLORIDE SERPL-SCNC: 103 MMOL/L (ref 98–108)
CREAT SERPL-MCNC: 0.8 MG/DL (ref 0.6–1.2)
GLUCOSE SERPL-MCNC: 114 MG/DL (ref 73–118)
POC ALT (SGPT): 31 U/L (ref 10–47)
POC AST (SGOT): 35 U/L (ref 11–38)
POC CARDIAC TROPONIN I: 0 NG/ML
POC PTINR: 1 (ref 0.9–1.2)
POC PTWBT: 11.5 SEC (ref 9.7–14.3)
POC TCO2: 29 MMOL/L (ref 18–33)
POTASSIUM BLD-SCNC: 3.8 MMOL/L (ref 3.6–5.1)
PROTEIN, POC: 6.9 G/DL (ref 6.4–8.1)
SAMPLE: NORMAL
SAMPLE: NORMAL
SODIUM BLD-SCNC: 145 MMOL/L (ref 128–145)

## 2018-12-16 PROCEDURE — 99284 EMERGENCY DEPT VISIT MOD MDM: CPT | Mod: 25

## 2018-12-16 PROCEDURE — 96374 THER/PROPH/DIAG INJ IV PUSH: CPT

## 2018-12-16 PROCEDURE — 93005 ELECTROCARDIOGRAM TRACING: CPT

## 2018-12-16 PROCEDURE — 85025 COMPLETE CBC W/AUTO DIFF WBC: CPT

## 2018-12-16 PROCEDURE — 80053 COMPREHEN METABOLIC PANEL: CPT

## 2018-12-16 PROCEDURE — 85610 PROTHROMBIN TIME: CPT

## 2018-12-16 PROCEDURE — 84484 ASSAY OF TROPONIN QUANT: CPT

## 2018-12-16 PROCEDURE — 93010 ELECTROCARDIOGRAM REPORT: CPT | Mod: ,,, | Performed by: INTERNAL MEDICINE

## 2018-12-16 PROCEDURE — 25000003 PHARM REV CODE 250: Performed by: INTERNAL MEDICINE

## 2018-12-16 RX ORDER — ACETAMINOPHEN 500 MG
1000 TABLET ORAL
Status: COMPLETED | OUTPATIENT
Start: 2018-12-16 | End: 2018-12-16

## 2018-12-16 RX ORDER — DILTIAZEM HYDROCHLORIDE 5 MG/ML
0.25 INJECTION INTRAVENOUS
Status: COMPLETED | OUTPATIENT
Start: 2018-12-16 | End: 2018-12-16

## 2018-12-16 RX ORDER — ASPIRIN 325 MG
325 TABLET ORAL
Status: DISCONTINUED | OUTPATIENT
Start: 2018-12-16 | End: 2018-12-17 | Stop reason: HOSPADM

## 2018-12-16 RX ORDER — DILTIAZEM HYDROCHLORIDE 120 MG/1
120 CAPSULE, EXTENDED RELEASE ORAL DAILY
Qty: 30 CAPSULE | Refills: 11 | OUTPATIENT
Start: 2018-12-16 | End: 2019-11-01

## 2018-12-16 RX ORDER — METOPROLOL TARTRATE 1 MG/ML
5 INJECTION, SOLUTION INTRAVENOUS
Status: DISCONTINUED | OUTPATIENT
Start: 2018-12-16 | End: 2018-12-16

## 2018-12-16 RX ADMIN — DILTIAZEM HYDROCHLORIDE 25 MG: 5 INJECTION INTRAVENOUS at 11:12

## 2018-12-16 RX ADMIN — ACETAMINOPHEN 1000 MG: 500 TABLET, FILM COATED ORAL at 11:12

## 2018-12-17 ENCOUNTER — HOSPITAL ENCOUNTER (EMERGENCY)
Facility: HOSPITAL | Age: 58
Discharge: HOME | End: 2018-12-17
Attending: EMERGENCY MEDICINE
Payer: MEDICAID

## 2018-12-17 VITALS
OXYGEN SATURATION: 98 % | RESPIRATION RATE: 15 BRPM | BODY MASS INDEX: 34.53 KG/M2 | DIASTOLIC BLOOD PRESSURE: 84 MMHG | SYSTOLIC BLOOD PRESSURE: 131 MMHG | WEIGHT: 220 LBS | TEMPERATURE: 98 F | HEART RATE: 79 BPM | HEIGHT: 67 IN

## 2018-12-17 DIAGNOSIS — R07.9 CHEST PAIN: ICD-10-CM

## 2018-12-17 DIAGNOSIS — R00.2 PALPITATION: ICD-10-CM

## 2018-12-17 DIAGNOSIS — I48.0 PAROXYSMAL ATRIAL FIBRILLATION: Primary | ICD-10-CM

## 2018-12-17 PROCEDURE — 93010 ELECTROCARDIOGRAM REPORT: CPT | Mod: ,,, | Performed by: INTERNAL MEDICINE

## 2018-12-17 PROCEDURE — 93005 ELECTROCARDIOGRAM TRACING: CPT

## 2018-12-17 PROCEDURE — 99283 EMERGENCY DEPT VISIT LOW MDM: CPT

## 2018-12-17 RX ORDER — DILTIAZEM HYDROCHLORIDE 5 MG/ML
25 INJECTION INTRAVENOUS
Status: DISCONTINUED | OUTPATIENT
Start: 2018-12-17 | End: 2018-12-17

## 2018-12-17 NOTE — ED PROVIDER NOTES
Encounter Date: 2018       History     Chief Complaint   Patient presents with    Palpitations     Pt reports Palpitation Hx of Afib; Pt was seen at [place] [facility] and discharge after given a Beta Blocker to decrease rate ;Pt states he still doesn't feel like himself;  Pt denies chest pain and SOB     Patient complains of persistent palpitations.  Symptoms started approximately 6 hr ago.  He has a history of paroxysmal atrial fibrillation.  He was seen earlier at the [facility] ER.  Initially his heart rate was in the 150s.  He was given 25 mg diltiazem with improvement in heart rate.  His heart rate was 100 when he left the emergency department.  He states that he feels like his heart rate is elevated again since leaving.  Patient admits that he only takes his Cardizem when he is having spells atrial fibrillation.  He is not persistently taking on a regular basis.  Denies chest pain or shortness of breath. He denies focal neurologic symptoms. Patient denies syncope.  No peripheral edema. No orthopnea.  On review of visit to [facility] it was revealed the patient's lab results were normal and that his EKG did not reveal any ischemic changes.          Review of patient's allergies indicates:   Allergen Reactions    Eggs [egg derived] Anaphylaxis    Ativan [lorazepam]      nausea    Corticosteroids (glucocorticoids) Palpitations     Past Medical History:   Diagnosis Date    Anxiety     Atrial fibrillation     Chronic back pain      Past Surgical History:   Procedure Laterality Date    CHOLECYSTECTOMY      HERNIA REPAIR      NASAL SEPTUM SURGERY      RECTAL SURGERY       No family history on file.  Social History     Tobacco Use    Smoking status: Current Every Day Smoker     Packs/day: 0.50     Types: Cigarettes     Last attempt to quit: 2010     Years since quittin.0    Smokeless tobacco: Never Used   Substance Use Topics    Alcohol use: No    Drug use: No     Review of Systems    Constitutional: Negative for fatigue and fever.   HENT: Negative for congestion.    Respiratory: Negative for cough, chest tightness, shortness of breath and wheezing.    Cardiovascular: Positive for palpitations. Negative for chest pain and leg swelling.   Gastrointestinal: Negative for abdominal pain, diarrhea, nausea and vomiting.   Musculoskeletal: Negative for back pain and neck pain.   Neurological: Negative for dizziness, syncope, facial asymmetry, speech difficulty, weakness, light-headedness, numbness and headaches.       Physical Exam     Initial Vitals [12/17/18 0244]   BP Pulse Resp Temp SpO2   129/67 104 19 97.6 °F (36.4 °C) 95 %      MAP       --         Physical Exam    Nursing note and vitals reviewed.  Constitutional: He appears well-developed and well-nourished. He is not diaphoretic. No distress.   HENT:   Head: Normocephalic and atraumatic.   Mouth/Throat: Oropharynx is clear and moist.   Eyes: Conjunctivae and EOM are normal. Pupils are equal, round, and reactive to light. Right eye exhibits no discharge. Left eye exhibits no discharge.   Neck: Neck supple. No tracheal deviation present.   Cardiovascular: Normal heart sounds. Exam reveals no gallop and no friction rub.    No murmur heard.  Tachycardic with irregularly irregular rhythm.   Pulmonary/Chest: Breath sounds normal. No stridor. No respiratory distress. He has no wheezes. He has no rhonchi. He has no rales. He exhibits no tenderness.   Abdominal: Soft. He exhibits no distension and no mass. There is no tenderness. There is no rebound and no guarding.   Musculoskeletal: Normal range of motion. He exhibits no edema or tenderness.   Neurological: He is alert and oriented to person, place, and time. He has normal strength. No cranial nerve deficit.   Skin: Skin is warm and dry. No rash noted.   Psychiatric: He has a normal mood and affect. His behavior is normal. Judgment and thought content normal.         ED Course   Procedures  Labs  Reviewed - No data to display  EKG Readings: (Independently Interpreted)   Rhythm: Atrial Fibrillation. Heart Rate: 129. Conduction: RBBB. ST Segments: Non-Specific ST Segment Depression. T Waves Flipped: V1, V2 and V3. Axis: Left Axis Deviation.   No acute ischemic changes.       Imaging Results    None          Medical Decision Making:   Differential Diagnosis:   Paroxysmal atrial fibrillation.  Electrolyte abnormality.  Medication noncompliance.  Clinical Tests:   Lab Tests: Reviewed  The following lab test(s) were unremarkable: BMP, CBC and Troponin  Medical Tests: Reviewed  ED Management:  Patient spontaneously converted to sinus rhythm without intervention while in the emergency department.  He is now asymptomatic.  Given that he had lab tests done earlier there is no need for further evaluation.  Patient encouraged to take his Cardizem as directed.  Patient's Trevor Vasc2 score is 0.  No need for anticoagulation.  Patient instructed follow up with his cardiologist.  May benefit from evaluation by electrophysiologist.  Repeat EKG shows normal sinus rhythm with a rate of 76 continued right bundle branch block and left axis deviation are present there are no ischemic changes.                      Clinical Impression:   The primary encounter diagnosis was Paroxysmal atrial fibrillation. Diagnoses of Palpitation and Chest pain were also pertinent to this visit.      Disposition:   Disposition: Discharged  Condition: Stable                        Wilfred Petersen MD  12/17/18 [id number]

## 2018-12-17 NOTE — DISCHARGE INSTRUCTIONS
Take antibiotics given today upon discharge from [facility] Emergency Department and follow-up with your cardiologist tomorrow for re-evaluation.

## 2018-12-17 NOTE — ED PROVIDER NOTES
"Encounter Date: 2018       History     Chief Complaint   Patient presents with    Palpitations     pt presents to ER with c/o being in "Atrial Fibb" since 8 pm, he has h/o and states he can tell when it starts.  Pt also c/o pain to left lower jaw from an infected tooth.      58-year-old male presents to the emergency department complaining of palpitations and a toothache.  Patient is to take has been present for the past few days and states he was seen at [facility] Emergency Department earlier today at which time he was given a prescription for amoxicillin.  He states he did not get his prescription filled because he did not know whether not he was allergic to amoxicillin.  He states he has never had a reaction to penicillin in the past.  He denies fever/chills, nausea/vomiting.  He states for the past several hours he has been in and AFib episode.  Usually his episodes do not last more than hour, but this 1 has persisted past the hour threshold.  He does not take anticoagulants except for aspirin and states he does not take his prescribed Cardizem regularly because his AFib is paroxysmal.  He denies chest pain/shortness of breath.      The history is provided by the patient. No  was used.     Review of patient's allergies indicates:   Allergen Reactions    Eggs [egg derived] Anaphylaxis    Ativan [lorazepam]      nausea    Corticosteroids (glucocorticoids) Palpitations     Past Medical History:   Diagnosis Date    Anxiety     Atrial fibrillation     Chronic back pain      Past Surgical History:   Procedure Laterality Date    CHOLECYSTECTOMY      HERNIA REPAIR      NASAL SEPTUM SURGERY      RECTAL SURGERY       History reviewed. No pertinent family history.  Social History     Tobacco Use    Smoking status: Current Every Day Smoker     Packs/day: 0.50     Types: Cigarettes     Last attempt to quit: 2010     Years since quittin.0    Smokeless " tobacco: Never Used   Substance Use Topics    Alcohol use: No    Drug use: No     Review of Systems   Respiratory: Negative for shortness of breath and wheezing.    Cardiovascular: Positive for palpitations. Negative for chest pain and leg swelling.   All other systems reviewed and are negative.      Physical Exam     Initial Vitals [12/16/18 2237]   BP Pulse Resp Temp SpO2   (!) 143/75 (!) 142 18 97.8 °F (36.6 °C) 98 %      MAP       --         Physical Exam    Nursing note and vitals reviewed.  Constitutional: He appears well-developed and well-nourished. No distress.   HENT:   Head: Normocephalic and atraumatic.   Right Ear: External ear normal.   Left Ear: External ear normal.   Eyes: Conjunctivae and EOM are normal.   Neck: Normal range of motion. Neck supple.   Cardiovascular: Intact distal pulses.   No murmur heard.  Irregularly irregular rhythm with tachycardia   Pulmonary/Chest: Breath sounds normal. No respiratory distress. He has no wheezes. He has no rales.   Abdominal: Soft. Bowel sounds are normal.   Musculoskeletal: Normal range of motion.   Neurological: He is alert and oriented to person, place, and time. He has normal strength.   Skin: Skin is warm and dry. Capillary refill takes less than 2 seconds.   Psychiatric: He has a normal mood and affect. Thought content normal.         ED Course   Procedures  Labs Reviewed   TROPONIN ISTAT   POCT CBC   POCT CMP   POCT PROTIME-INR   POCT TROPONIN   POCT CMP   ISTAT PROCEDURE     EKG Readings: (Independently Interpreted)   Initial Reading: No STEMI. Rhythm: Atrial Fibrillation. Heart Rate: 137. Conduction: RBBB. ST Segments: Normal ST Segments.   Other EKG Interpretations: Repeat EKG at 11:24 p.m., post Cardizem, reveals improved rate of 100 beats per minute with persistent atrial fibrillation.       Imaging Results          X-Ray Chest AP Portable (Final result)  Result time 12/16/18 22:58:32    Final result by Linda Mixon MD (12/16/18 22:58:32)  "                Narrative:    EXAMINATION:  XR CHEST AP PORTABLE    CLINICAL HISTORY:  Chest Pain;    TECHNIQUE:  Single frontal view of the chest was performed.    COMPARISON:  11/06/2018    FINDINGS:  Examination is limited by body habitus.  The heart size is within normal limits.  The lung fields demonstrate no focal consolidation, pneumothorax, or pleural effusion.    :  No acute intrathoracic abnormality      Electronically signed by: Linda Mixon  Date:    12/16/2018  Time:    22:58                               Medical Decision Making:   Initial Assessment:   58-year-old male presents to the emergency department complaining of palpitations and a toothache.  Patient is to take has been present for the past few days and states he was seen at [facility] Emergency Department earlier today at which time he was given a prescription for amoxicillin.  He states he did not get his prescription filled because he did not know whether not he was allergic to amoxicillin.  He states he has never had a reaction to penicillin in the past.  He denies fever/chills, nausea/vomiting.  He states for the past several hours he has been in "and AFib episode".  Usually his episodes do not last more than hour, but this 1 has persisted past the hour threshold.  He does not take anticoagulants except for aspirin and states he does not take his prescribed Cardizem regularly because his AFib is paroxysmal.  He denies chest pain/shortness of breath.    Clinical Tests:   Lab Tests: Ordered and Reviewed  ED Management:  CBC was within normal limits except for white blood cell count of 12.3.  CMP was within normal limits. Troponin was 0.  Patient's atrial fibrillation with RVR improved after Cardizem was given.  Prescription for Cardizem was also given and patient was advised to follow up with his primary care physician within the next week and to follow up with his cardiologist tomorrow for re-evaluation.  He was also " advised to return to the emergency department if condition worsens.                      Clinical Impression:   The primary encounter diagnosis was Atrial fibrillation, unspecified type. Diagnoses of Palpitations, Atrial fibrillation, and Toothache were also pertinent to this visit.      Disposition:   Disposition: Discharged  Condition: Stable                        Robert Nolan MD  12/17/18 042

## 2018-12-17 NOTE — ED PROVIDER NOTES
Encounter Date: 2018    SCRIBE #1 NOTE: I, Jeff Yee, am scribing for, and in the presence of,  Dr. Nolan. I have scribed the following portions of the note - Other sections scribed: HPI, ROS, PE.       History     Chief Complaint   Patient presents with    Palpitations     This is a 58 year old male complaining of being in Afib since apprx 2000 tonight. Patient has a history of it and can tell when it starts. He is also complaining of pain to left lower jaw pain due to infected left lower molar.       The history is provided by the patient.     Review of patient's allergies indicates:   Allergen Reactions    Eggs [egg derived] Anaphylaxis    Ativan [lorazepam]      nausea    Corticosteroids (glucocorticoids) Palpitations     Past Medical History:   Diagnosis Date    Anxiety     Atrial fibrillation     Chronic back pain      Past Surgical History:   Procedure Laterality Date    CHOLECYSTECTOMY      HERNIA REPAIR      NASAL SEPTUM SURGERY      RECTAL SURGERY       No family history on file.  Social History     Tobacco Use    Smoking status: Current Every Day Smoker     Packs/day: 0.50     Types: Cigarettes     Last attempt to quit: 2010     Years since quittin.0    Smokeless tobacco: Never Used   Substance Use Topics    Alcohol use: No    Drug use: No     Review of Systems   Constitutional: Negative for fever.   HENT: Positive for dental problem. Negative for sore throat.         Jaw pain    Respiratory: Negative for shortness of breath.    Cardiovascular: Positive for palpitations. Negative for chest pain.   Gastrointestinal: Negative for nausea.   Genitourinary: Negative for dysuria.   Musculoskeletal: Negative for back pain.   Skin: Negative for rash.   Neurological: Negative for weakness.   Hematological: Does not bruise/bleed easily.   All other systems reviewed and are negative.      Physical Exam     Initial Vitals   BP Pulse Resp Temp SpO2   -- -- -- -- --      MAP      Patient aware of normal results   --         Physical Exam    Nursing note and vitals reviewed.  Constitutional: Vital signs are normal. He appears well-developed and well-nourished.   HENT:   Head: Normocephalic and atraumatic.   Right Ear: External ear normal.   Left Ear: External ear normal.   Nose: Nose normal.   Mouth/Throat: Oropharynx is clear and moist and mucous membranes are normal.   Left posterior molar gingival erythema with slight edema noted.   Eyes: Conjunctivae are normal.   Neck: Neck supple.   Cardiovascular: Normal rate, normal heart sounds and normal pulses. An irregularly irregular rhythm present.  Exam reveals no gallop and no friction rub.    No murmur heard.  Pulmonary/Chest: Effort normal and breath sounds normal. No respiratory distress. He has no wheezes. He has no rhonchi. He has no rales.   Abdominal: Normal appearance.   Neurological: He is alert and oriented to person, place, and time.   Skin: Skin is warm and dry.   Psychiatric: He has a normal mood and affect.         ED Course   Procedures  Labs Reviewed - No data to display       Imaging Results    None          Medical Decision Making:   Initial Assessment:   This is a 58 year old male complaining of being in Afib since apprx 2000 tonight. Patient has a history of it and can tell when it starts. He is also complaining of pain to left lower jaw pain due to infected left lower molar.             Scribe Attestation:   Scribe #1: I performed the above scribed service and the documentation accurately describes the services I performed. I attest to the accuracy of the note.    ***           Clinical Impression:   No diagnosis found.

## 2018-12-17 NOTE — ED TRIAGE NOTES
Pt presents to ED with c/o palpitations r/t a-fib RVR. Upon being evaluated by physician, pt's rhythm converted. EKG confirms NSR.

## 2019-03-22 ENCOUNTER — HOSPITAL ENCOUNTER (OUTPATIENT)
Facility: HOSPITAL | Age: 59
Discharge: HOME | End: 2019-03-22
Attending: EMERGENCY MEDICINE | Admitting: EMERGENCY MEDICINE
Payer: MEDICAID

## 2019-03-22 VITALS
DIASTOLIC BLOOD PRESSURE: 73 MMHG | TEMPERATURE: 99 F | OXYGEN SATURATION: 95 % | SYSTOLIC BLOOD PRESSURE: 112 MMHG | WEIGHT: 220 LBS | RESPIRATION RATE: 15 BRPM | HEIGHT: 67 IN | BODY MASS INDEX: 34.53 KG/M2 | HEART RATE: 76 BPM

## 2019-03-22 DIAGNOSIS — R00.2 PALPITATIONS: ICD-10-CM

## 2019-03-22 DIAGNOSIS — I48.19 PERSISTENT ATRIAL FIBRILLATION: Primary | ICD-10-CM

## 2019-03-22 DIAGNOSIS — R06.02 SOB (SHORTNESS OF BREATH): ICD-10-CM

## 2019-03-22 DIAGNOSIS — I48.91 ATRIAL FIBRILLATION: ICD-10-CM

## 2019-03-22 LAB
ALBUMIN SERPL BCP-MCNC: 3.8 G/DL
ALP SERPL-CCNC: 54 U/L
ALT SERPL W/O P-5'-P-CCNC: 28 U/L
ANION GAP SERPL CALC-SCNC: 9 MMOL/L
AST SERPL-CCNC: 23 U/L
BASOPHILS # BLD AUTO: 0.04 K/UL
BASOPHILS NFR BLD: 0.4 %
BILIRUB SERPL-MCNC: 0.4 MG/DL
BUN SERPL-MCNC: 16 MG/DL
CALCIUM SERPL-MCNC: 9.9 MG/DL
CHLORIDE SERPL-SCNC: 106 MMOL/L
CO2 SERPL-SCNC: 25 MMOL/L
CREAT SERPL-MCNC: 0.8 MG/DL
DIFFERENTIAL METHOD: NORMAL
EOSINOPHIL # BLD AUTO: 0.4 K/UL
EOSINOPHIL NFR BLD: 3.8 %
ERYTHROCYTE [DISTWIDTH] IN BLOOD BY AUTOMATED COUNT: 13.7 %
EST. GFR  (AFRICAN AMERICAN): >60 ML/MIN/1.73 M^2
EST. GFR  (NON AFRICAN AMERICAN): >60 ML/MIN/1.73 M^2
GLUCOSE SERPL-MCNC: 115 MG/DL
HCT VFR BLD AUTO: 48.6 %
HGB BLD-MCNC: 16.5 G/DL
LYMPHOCYTES # BLD AUTO: 3.8 K/UL
LYMPHOCYTES NFR BLD: 40.5 %
MAGNESIUM SERPL-MCNC: 2.2 MG/DL
MCH RBC QN AUTO: 30.4 PG
MCHC RBC AUTO-ENTMCNC: 34 G/DL
MCV RBC AUTO: 90 FL
MONOCYTES # BLD AUTO: 0.8 K/UL
MONOCYTES NFR BLD: 8.9 %
NEUTROPHILS # BLD AUTO: 4.4 K/UL
NEUTROPHILS NFR BLD: 46.4 %
PLATELET # BLD AUTO: 291 K/UL
PMV BLD AUTO: 9.4 FL
POTASSIUM SERPL-SCNC: 3.8 MMOL/L
PROT SERPL-MCNC: 6.8 G/DL
RBC # BLD AUTO: 5.42 M/UL
SODIUM SERPL-SCNC: 140 MMOL/L
TROPONIN I SERPL DL<=0.01 NG/ML-MCNC: 0.01 NG/ML
WBC # BLD AUTO: 9.47 K/UL

## 2019-03-22 PROCEDURE — 99285 EMERGENCY DEPT VISIT HI MDM: CPT | Mod: 25

## 2019-03-22 PROCEDURE — 93005 ELECTROCARDIOGRAM TRACING: CPT

## 2019-03-22 PROCEDURE — 96374 THER/PROPH/DIAG INJ IV PUSH: CPT

## 2019-03-22 PROCEDURE — 93010 ELECTROCARDIOGRAM REPORT: CPT | Mod: ,,, | Performed by: INTERNAL MEDICINE

## 2019-03-22 PROCEDURE — 85025 COMPLETE CBC W/AUTO DIFF WBC: CPT

## 2019-03-22 PROCEDURE — 83735 ASSAY OF MAGNESIUM: CPT

## 2019-03-22 PROCEDURE — 80053 COMPREHEN METABOLIC PANEL: CPT

## 2019-03-22 PROCEDURE — 93010 EKG 12-LEAD: ICD-10-PCS | Mod: ,,, | Performed by: INTERNAL MEDICINE

## 2019-03-22 PROCEDURE — G0378 HOSPITAL OBSERVATION PER HR: HCPCS

## 2019-03-22 PROCEDURE — 84484 ASSAY OF TROPONIN QUANT: CPT

## 2019-03-22 PROCEDURE — 25000003 PHARM REV CODE 250: Performed by: EMERGENCY MEDICINE

## 2019-03-22 RX ORDER — NAPROXEN SODIUM 220 MG/1
81 TABLET, FILM COATED ORAL
Status: COMPLETED | OUTPATIENT
Start: 2019-03-22 | End: 2019-03-22

## 2019-03-22 RX ORDER — ONDANSETRON 2 MG/ML
4 INJECTION INTRAMUSCULAR; INTRAVENOUS EVERY 8 HOURS PRN
Status: DISCONTINUED | OUTPATIENT
Start: 2019-03-22 | End: 2019-03-22

## 2019-03-22 RX ORDER — DILTIAZEM HYDROCHLORIDE 5 MG/ML
10 INJECTION INTRAVENOUS
Status: COMPLETED | OUTPATIENT
Start: 2019-03-22 | End: 2019-03-22

## 2019-03-22 RX ORDER — SODIUM CHLORIDE 9 MG/ML
INJECTION, SOLUTION INTRAVENOUS CONTINUOUS
Status: DISCONTINUED | OUTPATIENT
Start: 2019-03-22 | End: 2019-03-22

## 2019-03-22 RX ADMIN — DILTIAZEM HYDROCHLORIDE 10 MG: 5 INJECTION INTRAVENOUS at 03:03

## 2019-03-22 RX ADMIN — ASPIRIN 81 MG 81 MG: 81 TABLET ORAL at 03:03

## 2019-03-22 NOTE — ED TRIAGE NOTES
"("I am in a-fib, its been that way since 7pm. I felt myself go into it. I took a calcium channel blocker at 7pm." Denies pain, sob, or any other complaints. )  "

## 2019-03-22 NOTE — ED PROVIDER NOTES
"Encounter Date: 3/22/2019       History     Chief Complaint   Patient presents with    Dizziness     "I am in a-fib, its been that way since 7pm. I felt myself go into it. I took a calcium channel blocker at 7pm." Denies pain, sob, or any other complaints.      Patient presents for evaluation of palpitations that started about 7:30 last night.  Patient has a history of paroxysmal atrial fibrillation.  His last episode was approximately 3 days ago.  Those symptoms resolved spontaneously.  Patient states he usually can take Cardizem 120 mg when his symptoms are persistent and he will improved.  He tried that tonight without improvement.  He has had a mild episode of shortness of breath earlier tonight which has resolved.  He denies chest pain. He denies diaphoresis.  He denies syncope.  He denies any focal numbness or weakness.  He denies any blurred vision.  He denies any calf pain or leg swelling. He states the symptoms are typical of his previous episodes of paroxysmal atrial fibrillation.  Patient had similar symptoms in December and spontaneously converted while in the emergency department without intervention.          Review of patient's allergies indicates:   Allergen Reactions    Eggs [egg derived] Anaphylaxis    Ativan [lorazepam]      nausea    Corticosteroids (glucocorticoids) Palpitations     Past Medical History:   Diagnosis Date    Anxiety     Atrial fibrillation     Chronic back pain      Past Surgical History:   Procedure Laterality Date    CHOLECYSTECTOMY      HERNIA REPAIR      NASAL SEPTUM SURGERY      RECTAL SURGERY       No family history on file.  Social History     Tobacco Use    Smoking status: Current Every Day Smoker     Packs/day: 0.50     Types: Cigarettes     Last attempt to quit: 2010     Years since quittin.2    Smokeless tobacco: Never Used   Substance Use Topics    Alcohol use: No    Drug use: No     Review of Systems   Constitutional: Negative for chills, " diaphoresis and fever.   Eyes: Negative.    Respiratory: Positive for shortness of breath. Negative for cough and wheezing.    Cardiovascular: Positive for palpitations. Negative for chest pain and leg swelling.   Gastrointestinal: Negative for abdominal pain, nausea and vomiting.        NO melena or rectal bleeding   Musculoskeletal: Negative for back pain.   Neurological: Negative for dizziness, seizures, syncope, facial asymmetry, speech difficulty, weakness, light-headedness, numbness and headaches.        No numbness   Psychiatric/Behavioral: Negative for confusion.   All other systems reviewed and are negative.      Physical Exam     Initial Vitals [03/22/19 0250]   BP Pulse Resp Temp SpO2   113/75 70 18 97.6 °F (36.4 °C) (!) 94 %      MAP       --         Physical Exam    Nursing note and vitals reviewed.  Constitutional: He appears well-developed and well-nourished. He is not diaphoretic. No distress.   HENT:   Head: Normocephalic and atraumatic.   Nose: Nose normal.   Mouth/Throat: Oropharynx is clear and moist.   Eyes: Conjunctivae and EOM are normal. Pupils are equal, round, and reactive to light. Right eye exhibits no discharge. Left eye exhibits no discharge.   Neck: Neck supple. No tracheal deviation present.   Cardiovascular: Normal heart sounds.   No murmur heard.  Irregularly irregular rhythm. Normal rate.   Pulmonary/Chest: Breath sounds normal. No stridor. No respiratory distress. He has no wheezes. He has no rhonchi. He has no rales.   Abdominal: Soft. He exhibits no distension. There is no tenderness. There is no rebound and no guarding.   Musculoskeletal: Normal range of motion. He exhibits no edema or tenderness.   Neurological: He is alert and oriented to person, place, and time. He has normal strength. No cranial nerve deficit.   Skin: Skin is warm and dry. No rash noted.   Psychiatric: He has a normal mood and affect. His behavior is normal. Judgment and thought content normal.         ED  Course   Procedures  Labs Reviewed   CBC W/ AUTO DIFFERENTIAL   COMPREHENSIVE METABOLIC PANEL   MAGNESIUM   TROPONIN I     EKG Readings: (Independently Interpreted)   Initial Reading: No STEMI. Rhythm: Atrial Fibrillation. Heart Rate: 103. Ectopy: No Ectopy. Conduction: RBBB. ST Segments: Non-Specific ST Segment Depression. T Waves Flipped: V1, V2 and V3. Axis: Normal.   Morphology unchanged from previous EKG on December 17, 2018.  Patient was in sinus rhythm at that time.  Atrial fibrillation is consistent with other previous EKGs from December 17, 2018, no acute ischemic changes       Imaging Results    None          Medical Decision Making:   Differential Diagnosis:   Electrolyte abnormality, paroxysmal atrial fibrillation, acute coronary syndrome.  Clinical Tests:   Lab Tests: Reviewed  The following lab test(s) were unremarkable: CBC, CMP and Troponin  Medical Tests: Reviewed  ED Management:  Patient remains atrial fibrillation.  He remains uncomfortable but is in no distress. Will admit for echocardiogram before cardioversion this patient is having symptoms intermittently frequently.  Discussed with patient.  He is agreeable.  Discussed with Dr. Carroll    Patient spontaneously converted to normal sinus rhythm. Repeat EKG shows no sign of ischemia.  Patient now asymptomatic.  Will discharge to follow up with his cardiologist as an outpatient.  Will continue his Cardizem as needed.  Patient will take aspirin daily.                      Clinical Impression:       ICD-10-CM ICD-9-CM   1. Persistent atrial fibrillation I48.1 427.31   2. Palpitations R00.2 785.1   3. SOB (shortness of breath) R06.02 786.05         Disposition:   Disposition: Discharged  Condition: Stable                        Wilfred Petersen MD  03/22/19 0445       Wilfred Petersen MD  03/22/19 0523

## 2019-04-09 ENCOUNTER — OFFICE VISIT (OUTPATIENT)
Dept: SURGERY | Facility: CLINIC | Age: 59
End: 2019-04-09
Payer: MEDICAID

## 2019-04-09 VITALS
SYSTOLIC BLOOD PRESSURE: 125 MMHG | TEMPERATURE: 98 F | WEIGHT: 226.19 LBS | DIASTOLIC BLOOD PRESSURE: 86 MMHG | HEIGHT: 67 IN | BODY MASS INDEX: 35.5 KG/M2 | HEART RATE: 99 BPM

## 2019-04-09 DIAGNOSIS — M62.08 RECTUS DIASTASIS: ICD-10-CM

## 2019-04-09 DIAGNOSIS — K42.9 UMBILICAL HERNIA WITHOUT OBSTRUCTION AND WITHOUT GANGRENE: Primary | ICD-10-CM

## 2019-04-09 PROCEDURE — 99213 OFFICE O/P EST LOW 20 MIN: CPT | Mod: PBBFAC,PO | Performed by: SURGERY

## 2019-04-09 PROCEDURE — 99213 OFFICE O/P EST LOW 20 MIN: CPT | Mod: S$PBB,,, | Performed by: SURGERY

## 2019-04-09 PROCEDURE — 99999 PR PBB SHADOW E&M-EST. PATIENT-LVL III: ICD-10-PCS | Mod: PBBFAC,,, | Performed by: SURGERY

## 2019-04-09 PROCEDURE — 99213 PR OFFICE/OUTPT VISIT, EST, LEVL III, 20-29 MIN: ICD-10-PCS | Mod: S$PBB,,, | Performed by: SURGERY

## 2019-04-09 PROCEDURE — 99999 PR PBB SHADOW E&M-EST. PATIENT-LVL III: CPT | Mod: PBBFAC,,, | Performed by: SURGERY

## 2019-04-09 RX ORDER — TRAMADOL HYDROCHLORIDE 50 MG/1
1 TABLET ORAL
Refills: 0 | Status: ON HOLD | COMMUNITY
Start: 2019-02-08 | End: 2022-05-19 | Stop reason: HOSPADM

## 2019-04-10 NOTE — PROGRESS NOTES
Subjective:       Patient ID: Timmy Wade is a 58 y.o. male.    Chief Complaint: Hernia ([facility]/Rectus diastasis-Dr. León's patient)    HPI  Pleasatn 57 yo M referred to me for evaluation of umbilical hernia and rectus diastasiss.  Pt notes that he was on schedule for surgery in 10/18 but had paroxysmal a fib which required work up. He now notes he has been cleared for surgery and is ready to proceed with repair.  He denies n/v.  No fever/chills.  No changes in bowel habits.  Pt is currently smoking and has recently started a diet.       Review of Systems   Constitutional: Negative for activity change, appetite change, diaphoresis, fever and unexpected weight change.   Respiratory: Negative for cough, chest tightness and wheezing.    Cardiovascular: Negative for chest pain and palpitations.   Gastrointestinal: Negative for abdominal distention, abdominal pain, anal bleeding, blood in stool, constipation, diarrhea, nausea, rectal pain and vomiting.   Genitourinary: Negative for difficulty urinating, dysuria and hematuria.   Musculoskeletal: Negative for back pain and gait problem.   Skin: Negative for color change and wound.   Neurological: Negative for tremors and seizures.       Objective:      Physical Exam   Constitutional: He is oriented to person, place, and time. He appears well-developed and well-nourished.   HENT:   Head: Normocephalic and atraumatic.   Eyes: Pupils are equal, round, and reactive to light.   Neck: Normal range of motion. No tracheal deviation present. No thyromegaly present.   Cardiovascular: Normal rate, regular rhythm and normal heart sounds. Exam reveals no gallop and no friction rub.   No murmur heard.  Pulmonary/Chest: Effort normal and breath sounds normal. He has no wheezes. He exhibits no tenderness.   Abdominal: He exhibits no distension and no mass. There is no tenderness. There is no rebound and no guarding. A hernia is present.       Musculoskeletal: Normal range of motion.    Neurological: He is alert and oriented to person, place, and time. Coordination normal.   Skin: Skin is warm.   Psychiatric: He has a normal mood and affect.   Vitals reviewed.        US; 2018 confirms umbilical hernia  Assessment:     Umbilical hernia with rectus diastasis  No diagnosis found.    Plan:       D/w pt. I have offered to repair hernia but did d/w pt that prior to surgical repair would need to lose weight and would also require smoking cessation.  He will work on these goals and will f/u when ready

## 2019-11-01 ENCOUNTER — HOSPITAL ENCOUNTER (EMERGENCY)
Facility: HOSPITAL | Age: 59
Discharge: HOME | End: 2019-11-01
Attending: EMERGENCY MEDICINE
Payer: MEDICAID

## 2019-11-01 VITALS
WEIGHT: 220 LBS | DIASTOLIC BLOOD PRESSURE: 71 MMHG | RESPIRATION RATE: 16 BRPM | BODY MASS INDEX: 35.36 KG/M2 | OXYGEN SATURATION: 97 % | HEIGHT: 66 IN | HEART RATE: 90 BPM | TEMPERATURE: 98 F | SYSTOLIC BLOOD PRESSURE: 110 MMHG

## 2019-11-01 DIAGNOSIS — I48.91 ATRIAL FIBRILLATION WITH RVR: Primary | ICD-10-CM

## 2019-11-01 DIAGNOSIS — R07.9 CHEST PAIN: ICD-10-CM

## 2019-11-01 LAB
ALBUMIN SERPL BCP-MCNC: 4.2 G/DL (ref 3.5–5.2)
ALP SERPL-CCNC: 63 U/L (ref 55–135)
ALT SERPL W/O P-5'-P-CCNC: 26 U/L (ref 10–44)
ANION GAP SERPL CALC-SCNC: 12 MMOL/L (ref 8–16)
AST SERPL-CCNC: 27 U/L (ref 10–40)
BASOPHILS # BLD AUTO: 0.08 K/UL (ref 0–0.2)
BASOPHILS NFR BLD: 0.8 % (ref 0–1.9)
BILIRUB SERPL-MCNC: 0.5 MG/DL (ref 0.1–1)
BNP SERPL-MCNC: 94 PG/ML (ref 0–99)
BUN SERPL-MCNC: 17 MG/DL (ref 6–20)
CALCIUM SERPL-MCNC: 10 MG/DL (ref 8.7–10.5)
CHLORIDE SERPL-SCNC: 106 MMOL/L (ref 95–110)
CO2 SERPL-SCNC: 22 MMOL/L (ref 23–29)
CREAT SERPL-MCNC: 0.9 MG/DL (ref 0.5–1.4)
DIFFERENTIAL METHOD: ABNORMAL
EOSINOPHIL # BLD AUTO: 0.3 K/UL (ref 0–0.5)
EOSINOPHIL NFR BLD: 2.9 % (ref 0–8)
ERYTHROCYTE [DISTWIDTH] IN BLOOD BY AUTOMATED COUNT: 13.3 % (ref 11.5–14.5)
EST. GFR  (AFRICAN AMERICAN): >60 ML/MIN/1.73 M^2
EST. GFR  (NON AFRICAN AMERICAN): >60 ML/MIN/1.73 M^2
GLUCOSE SERPL-MCNC: 119 MG/DL (ref 70–110)
HCT VFR BLD AUTO: 52.9 % (ref 40–54)
HGB BLD-MCNC: 17.9 G/DL (ref 14–18)
IMM GRANULOCYTES # BLD AUTO: 0.04 K/UL (ref 0–0.04)
IMM GRANULOCYTES NFR BLD AUTO: 0.4 % (ref 0–0.5)
LYMPHOCYTES # BLD AUTO: 3.4 K/UL (ref 1–4.8)
LYMPHOCYTES NFR BLD: 33.2 % (ref 18–48)
MCH RBC QN AUTO: 29.4 PG (ref 27–31)
MCHC RBC AUTO-ENTMCNC: 33.8 G/DL (ref 32–36)
MCV RBC AUTO: 87 FL (ref 82–98)
MONOCYTES # BLD AUTO: 0.8 K/UL (ref 0.3–1)
MONOCYTES NFR BLD: 7.9 % (ref 4–15)
NEUTROPHILS # BLD AUTO: 5.6 K/UL (ref 1.8–7.7)
NEUTROPHILS NFR BLD: 54.8 % (ref 38–73)
NRBC BLD-RTO: 0 /100 WBC
PLATELET # BLD AUTO: 378 K/UL (ref 150–350)
PMV BLD AUTO: 10.1 FL (ref 9.2–12.9)
POTASSIUM SERPL-SCNC: 4.6 MMOL/L (ref 3.5–5.1)
PROT SERPL-MCNC: 7.8 G/DL (ref 6–8.4)
RBC # BLD AUTO: 6.09 M/UL (ref 4.6–6.2)
SODIUM SERPL-SCNC: 140 MMOL/L (ref 136–145)
TROPONIN I SERPL DL<=0.01 NG/ML-MCNC: 0.02 NG/ML (ref 0–0.03)
TSH SERPL DL<=0.005 MIU/L-ACNC: 1.28 UIU/ML (ref 0.4–4)
WBC # BLD AUTO: 10.25 K/UL (ref 3.9–12.7)

## 2019-11-01 PROCEDURE — 80053 COMPREHEN METABOLIC PANEL: CPT

## 2019-11-01 PROCEDURE — 93005 ELECTROCARDIOGRAM TRACING: CPT

## 2019-11-01 PROCEDURE — 25000003 PHARM REV CODE 250: Performed by: EMERGENCY MEDICINE

## 2019-11-01 PROCEDURE — 93010 ELECTROCARDIOGRAM REPORT: CPT | Mod: ,,, | Performed by: INTERNAL MEDICINE

## 2019-11-01 PROCEDURE — 93010 EKG 12-LEAD: ICD-10-PCS | Mod: ,,, | Performed by: INTERNAL MEDICINE

## 2019-11-01 PROCEDURE — 99285 EMERGENCY DEPT VISIT HI MDM: CPT | Mod: 25

## 2019-11-01 PROCEDURE — 83880 ASSAY OF NATRIURETIC PEPTIDE: CPT

## 2019-11-01 PROCEDURE — 84484 ASSAY OF TROPONIN QUANT: CPT

## 2019-11-01 PROCEDURE — 84443 ASSAY THYROID STIM HORMONE: CPT

## 2019-11-01 PROCEDURE — 96375 TX/PRO/DX INJ NEW DRUG ADDON: CPT

## 2019-11-01 PROCEDURE — 85025 COMPLETE CBC W/AUTO DIFF WBC: CPT

## 2019-11-01 PROCEDURE — 96374 THER/PROPH/DIAG INJ IV PUSH: CPT

## 2019-11-01 RX ORDER — METOPROLOL TARTRATE 1 MG/ML
5 INJECTION, SOLUTION INTRAVENOUS
Status: COMPLETED | OUTPATIENT
Start: 2019-11-01 | End: 2019-11-01

## 2019-11-01 RX ORDER — DILTIAZEM HYDROCHLORIDE 5 MG/ML
15 INJECTION INTRAVENOUS
Status: COMPLETED | OUTPATIENT
Start: 2019-11-01 | End: 2019-11-01

## 2019-11-01 RX ORDER — METOPROLOL SUCCINATE 50 MG/1
50 TABLET, EXTENDED RELEASE ORAL DAILY
Qty: 30 TABLET | Refills: 1 | Status: ON HOLD | OUTPATIENT
Start: 2019-11-01 | End: 2021-08-26 | Stop reason: HOSPADM

## 2019-11-01 RX ADMIN — METOPROLOL TARTRATE 5 MG: 5 INJECTION INTRAVENOUS at 12:11

## 2019-11-01 RX ADMIN — DILTIAZEM HYDROCHLORIDE 15 MG: 5 INJECTION INTRAVENOUS at 11:11

## 2019-11-01 NOTE — ED NOTES
"Pt requested to speak to MD. Pt states "Im ready to go home, I feel much better." MD notified. States pt is stable. Will discharge  "

## 2019-11-01 NOTE — ED PROVIDER NOTES
Encounter Date: 2019    SCRIBE #1 NOTE: I, Rufus Enriquez, am scribing for, and in the presence of,  Jose Elias Ko MD. I have scribed the following portions of the note - Other sections scribed: HPI, ROS.       History     Chief Complaint   Patient presents with    Tachycardia     pt states he has a hx of Afib and he thinks he is in it; pt's HR is 150s in triage; says he takes a CCB for it but says it is not helping     This is a 59 y.o. male with history of paroxysmal atrial fibrillation who presents to the ED with complaint of palpitations. Patient also reports chest pain and mild shortness of breath. Patient thinks that he is in A fib. Patient states having episodes of similar symptoms in the past and that this usually happens about 1-3 times monthly. Patient states this current episode started at 3 PM yesterday and is still ongoing. Patient states the episodes always start around 3 PM and last until 10 AM the next day. Patient denies taking any blood thinners. He states takes daily Cardizem and aspirin  81 mg. Patient reports taking several doses of Aspirin prior to arrival.         Review of patient's allergies indicates:   Allergen Reactions    Corticosteroids (glucocorticoids) Palpitations and Anaphylaxis    Eggs [egg derived] Anaphylaxis    Eggshell membrane Anaphylaxis    Cephalexin      Anaphylaxis^    Lorazepam Other (See Comments)     nausea  Heart RACING     Past Medical History:   Diagnosis Date    Anxiety     Atrial fibrillation     Chronic back pain      Past Surgical History:   Procedure Laterality Date    CHOLECYSTECTOMY      HERNIA REPAIR      NASAL SEPTUM SURGERY      RECTAL SURGERY       History reviewed. No pertinent family history.  Social History     Tobacco Use    Smoking status: Current Every Day Smoker     Packs/day: 0.50     Types: Cigarettes     Last attempt to quit: 2010     Years since quittin.9    Smokeless tobacco: Never Used   Substance Use Topics     Alcohol use: No    Drug use: No     Review of Systems   Constitutional: Negative for chills and fever.   HENT: Negative for congestion and sore throat.    Eyes: Negative for visual disturbance.   Respiratory: Positive for shortness of breath. Negative for cough.    Cardiovascular: Positive for chest pain and palpitations.   Gastrointestinal: Negative for abdominal pain, nausea and vomiting.   Genitourinary: Negative for dysuria.   Skin: Negative for rash.   Allergic/Immunologic: Negative for immunocompromised state.   Neurological: Negative for headaches.       Physical Exam     Initial Vitals   BP Pulse Resp Temp SpO2   11/01/19 1126 11/01/19 1126 11/01/19 1126 11/01/19 1332 11/01/19 1126   (!) 115/90 (!) 157 18 98.1 °F (36.7 °C) 97 %      MAP       --                Physical Exam    Constitutional: He appears well-developed and well-nourished.   HENT:   Head: Normocephalic and atraumatic.   Eyes: EOM are normal. Pupils are equal, round, and reactive to light.   Neck: Normal range of motion. Neck supple. No thyromegaly present. No JVD present.   Cardiovascular: An irregular rhythm present.  Tachycardia present.  Exam reveals no gallop and no friction rub.    No murmur heard.  Heart rate in the 150s.    Pulmonary/Chest: Breath sounds normal. No respiratory distress.   Abdominal: Soft. Bowel sounds are normal. He exhibits no distension. There is no tenderness.   Musculoskeletal: Normal range of motion. He exhibits no edema or tenderness.   Neurological: He is alert and oriented to person, place, and time. He has normal strength. GCS score is 15. GCS eye subscore is 4. GCS verbal subscore is 5. GCS motor subscore is 6.   Skin: Skin is warm and dry. Capillary refill takes less than 2 seconds.         ED Course   Critical Care  Date/Time: 11/1/2019 1:32 PM  Performed by: Jose Elias Ko MD  Authorized by: Jose Elias Ko MD   Direct patient critical care time: 15 minutes  Additional history critical care  time: 10 minutes  Ordering / reviewing critical care time: 8 minutes  Documentation critical care time: 8 minutes  Consulting other physicians critical care time: 7 minutes  Total critical care time (exclusive of procedural time) : 48 minutes  Critical care time was exclusive of separately billable procedures and treating other patients and teaching time.  Critical care was necessary to treat or prevent imminent or life-threatening deterioration of the following conditions: cardiac failure and circulatory failure.  Critical care was time spent personally by me on the following activities: development of treatment plan with patient or surrogate, discussions with consultants, interpretation of cardiac output measurements, evaluation of patient's response to treatment, examination of patient, obtaining history from patient or surrogate, ordering and performing treatments and interventions, ordering and review of laboratory studies, ordering and review of radiographic studies, pulse oximetry, re-evaluation of patient's condition and review of old charts.        Labs Reviewed   CBC W/ AUTO DIFFERENTIAL - Abnormal; Notable for the following components:       Result Value    Platelets 378 (*)     All other components within normal limits   COMPREHENSIVE METABOLIC PANEL - Abnormal; Notable for the following components:    CO2 22 (*)     Glucose 119 (*)     All other components within normal limits   TROPONIN I   B-TYPE NATRIURETIC PEPTIDE   TSH     EKG Readings: (Independently Interpreted)   Initial Reading: No STEMI. Rhythm: Atrial Flutter. Heart Rate: 154. Conduction: RBBB. Axis: Left Axis Deviation.   2:! Conduction.      ECG Results          EKG 12-lead (Final result)  Result time 11/02/19 13:34:33    Final result by Interface, Lab In [facility] (11/02/19 13:34:33)                 Narrative:    Test Reason : R07.9,    Vent. Rate : 154 BPM     Atrial Rate : 047 BPM     P-R Int : 000 ms          QRS Dur : 130 ms      QT Int  : 342 ms       P-R-T Axes : 000 -68 041 degrees     QTc Int : 547 ms    Atrial flutter with 2 to 1 block  Left axis deviation  Right bundle branch block  Anterior infarct ,age undetermined  Abnormal ECG  When compared with ECG of 22-MAR-2019 05:32,  Significant changes have occurred  Confirmed by Chris Vaughan MD ([id number]) on 11/2/2019 1:34:21 PM    Referred By: System System           Confirmed By:Chris Vaughan MD                             EKG 12-LEAD (Final result)  Result time 11/13/19 07:06:02    Final result by Unknown User (11/13/19 07:06:02)                                Imaging Results          X-Ray Chest AP Portable (Final result)  Result time 11/01/19 12:13:16    Final result by Glenn Tovar MD (11/01/19 12:13:16)                 Impression:      1. No acute cardiopulmonary process, hypoventilatory exam.      Electronically signed by: Glenn Tovar MD  Date:    11/01/2019  Time:    12:13             Narrative:    EXAMINATION:  XR CHEST AP PORTABLE    CLINICAL HISTORY:  Chest Pain;    TECHNIQUE:  Single frontal view of the chest was performed.    COMPARISON:  03/22/2019    FINDINGS:  The cardiomediastinal silhouette is not enlarged, stable..  There is no pleural effusion.  The trachea is midline.  The lungs are symmetrically expanded bilaterally without evidence of acute parenchymal process. No large focal consolidation seen.  There is no pneumothorax.  The osseous structures are remarkable for degenerative change..                              X-Rays:   Independently Interpreted Readings:   Chest X-Ray: Normal heart size.  No infiltrates.  No acute abnormalities.      Atrial flutter with history of atrial flutter. Rate controlled but still in atrial flutter. Discussed with Cardiology. Recommends metoprolol. Patient refusing blood thinners at this time. Close cardiology follow up to discuss further.           Scribe Attestation:   Scribe #1: I performed the above scribed service and the  documentation accurately describes the services I performed. I attest to the accuracy of the note.               Clinical Impression:       ICD-10-CM ICD-9-CM   1. Atrial fibrillation with RVR I48.91 427.31   2. Chest pain R07.9 786.50                            I, Jose Elias Ko, personally performed the services described in this documentation. All medical record entries made by the scribe were at my direction and in my presence.  I have reviewed the chart and agree that the record reflects my personal performance and is accurate and complete.       Jose Elias Ko MD  11/13/19 9721

## 2019-11-01 NOTE — ED TRIAGE NOTES
"Pt presents to ED with complaints of increased heart rate. Pt reports " I go into Afib around 3 and I'm usually out of it by 10". Pt report feeling light headed, denies chest pain.   "

## 2021-07-16 ENCOUNTER — HOSPITAL ENCOUNTER (EMERGENCY)
Facility: HOSPITAL | Age: 61
Discharge: LEFT BEFORE DISCHARGE | End: 2021-07-16
Attending: EMERGENCY MEDICINE | Admitting: EMERGENCY MEDICINE
Payer: MEDICAID

## 2021-07-16 VITALS
HEART RATE: 92 BPM | BODY MASS INDEX: 35.36 KG/M2 | OXYGEN SATURATION: 95 % | DIASTOLIC BLOOD PRESSURE: 97 MMHG | HEIGHT: 66 IN | SYSTOLIC BLOOD PRESSURE: 119 MMHG | WEIGHT: 220 LBS | RESPIRATION RATE: 21 BRPM | TEMPERATURE: 98 F

## 2021-07-16 DIAGNOSIS — R10.84 GENERALIZED ABDOMINAL PAIN: ICD-10-CM

## 2021-07-16 DIAGNOSIS — I49.9 IRREGULAR HEART RHYTHM: ICD-10-CM

## 2021-07-16 DIAGNOSIS — I48.91 ATRIAL FIBRILLATION WITH RVR: Primary | ICD-10-CM

## 2021-07-16 LAB
ALBUMIN SERPL BCP-MCNC: 3.6 G/DL (ref 3.5–5.2)
ALP SERPL-CCNC: 63 U/L (ref 55–135)
ALT SERPL W/O P-5'-P-CCNC: 38 U/L (ref 10–44)
ANION GAP SERPL CALC-SCNC: 7 MMOL/L (ref 8–16)
AST SERPL-CCNC: 25 U/L (ref 10–40)
BACTERIA #/AREA URNS HPF: NORMAL /HPF
BASOPHILS # BLD AUTO: 0.06 K/UL (ref 0–0.2)
BASOPHILS NFR BLD: 0.5 % (ref 0–1.9)
BILIRUB SERPL-MCNC: 1 MG/DL (ref 0.1–1)
BILIRUB UR QL STRIP: NEGATIVE
BUN SERPL-MCNC: 18 MG/DL (ref 6–20)
CALCIUM SERPL-MCNC: 9.4 MG/DL (ref 8.7–10.5)
CHLORIDE SERPL-SCNC: 110 MMOL/L (ref 95–110)
CLARITY UR: CLEAR
CO2 SERPL-SCNC: 23 MMOL/L (ref 23–29)
COLOR UR: YELLOW
CREAT SERPL-MCNC: 0.9 MG/DL (ref 0.5–1.4)
CTP QC/QA: YES
DIFFERENTIAL METHOD: ABNORMAL
EOSINOPHIL # BLD AUTO: 0.2 K/UL (ref 0–0.5)
EOSINOPHIL NFR BLD: 1.5 % (ref 0–8)
ERYTHROCYTE [DISTWIDTH] IN BLOOD BY AUTOMATED COUNT: 14.3 % (ref 11.5–14.5)
EST. GFR  (AFRICAN AMERICAN): >60 ML/MIN/1.73 M^2
EST. GFR  (NON AFRICAN AMERICAN): >60 ML/MIN/1.73 M^2
GLUCOSE SERPL-MCNC: 141 MG/DL (ref 70–110)
GLUCOSE UR QL STRIP: NEGATIVE
HCT VFR BLD AUTO: 49.6 % (ref 40–54)
HGB BLD-MCNC: 17 G/DL (ref 14–18)
HGB UR QL STRIP: NEGATIVE
HYALINE CASTS #/AREA URNS LPF: 1 /LPF
IMM GRANULOCYTES # BLD AUTO: 0.04 K/UL (ref 0–0.04)
IMM GRANULOCYTES NFR BLD AUTO: 0.3 % (ref 0–0.5)
KETONES UR QL STRIP: NEGATIVE
LEUKOCYTE ESTERASE UR QL STRIP: NEGATIVE
LIPASE SERPL-CCNC: 37 U/L (ref 4–60)
LYMPHOCYTES # BLD AUTO: 3.6 K/UL (ref 1–4.8)
LYMPHOCYTES NFR BLD: 28.5 % (ref 18–48)
MAGNESIUM SERPL-MCNC: 2.2 MG/DL (ref 1.6–2.6)
MCH RBC QN AUTO: 30.4 PG (ref 27–31)
MCHC RBC AUTO-ENTMCNC: 34.3 G/DL (ref 32–36)
MCV RBC AUTO: 89 FL (ref 82–98)
MICROSCOPIC COMMENT: NORMAL
MONOCYTES # BLD AUTO: 1.1 K/UL (ref 0.3–1)
MONOCYTES NFR BLD: 9.1 % (ref 4–15)
NEUTROPHILS # BLD AUTO: 7.5 K/UL (ref 1.8–7.7)
NEUTROPHILS NFR BLD: 60.1 % (ref 38–73)
NITRITE UR QL STRIP: NEGATIVE
NRBC BLD-RTO: 0 /100 WBC
PH UR STRIP: 6 [PH] (ref 5–8)
PLATELET # BLD AUTO: 244 K/UL (ref 150–450)
PMV BLD AUTO: 10.4 FL (ref 9.2–12.9)
POTASSIUM SERPL-SCNC: 4 MMOL/L (ref 3.5–5.1)
PROT SERPL-MCNC: 6.2 G/DL (ref 6–8.4)
PROT UR QL STRIP: ABNORMAL
RBC # BLD AUTO: 5.6 M/UL (ref 4.6–6.2)
RBC #/AREA URNS HPF: 4 /HPF (ref 0–4)
SARS-COV-2 RDRP RESP QL NAA+PROBE: NEGATIVE
SODIUM SERPL-SCNC: 140 MMOL/L (ref 136–145)
SP GR UR STRIP: 1.03 (ref 1–1.03)
TROPONIN I SERPL DL<=0.01 NG/ML-MCNC: 0.02 NG/ML (ref 0–0.03)
TSH SERPL DL<=0.005 MIU/L-ACNC: 1.63 UIU/ML (ref 0.4–4)
URN SPEC COLLECT METH UR: ABNORMAL
UROBILINOGEN UR STRIP-ACNC: ABNORMAL EU/DL
WBC # BLD AUTO: 12.48 K/UL (ref 3.9–12.7)
WBC #/AREA URNS HPF: 2 /HPF (ref 0–5)

## 2021-07-16 PROCEDURE — 83735 ASSAY OF MAGNESIUM: CPT | Performed by: EMERGENCY MEDICINE

## 2021-07-16 PROCEDURE — 80053 COMPREHEN METABOLIC PANEL: CPT | Performed by: EMERGENCY MEDICINE

## 2021-07-16 PROCEDURE — 84484 ASSAY OF TROPONIN QUANT: CPT | Performed by: EMERGENCY MEDICINE

## 2021-07-16 PROCEDURE — 93010 ELECTROCARDIOGRAM REPORT: CPT | Mod: ,,, | Performed by: INTERNAL MEDICINE

## 2021-07-16 PROCEDURE — 93005 ELECTROCARDIOGRAM TRACING: CPT

## 2021-07-16 PROCEDURE — 96374 THER/PROPH/DIAG INJ IV PUSH: CPT

## 2021-07-16 PROCEDURE — 83690 ASSAY OF LIPASE: CPT | Performed by: EMERGENCY MEDICINE

## 2021-07-16 PROCEDURE — 63600175 PHARM REV CODE 636 W HCPCS: Performed by: EMERGENCY MEDICINE

## 2021-07-16 PROCEDURE — 84443 ASSAY THYROID STIM HORMONE: CPT | Performed by: EMERGENCY MEDICINE

## 2021-07-16 PROCEDURE — 85025 COMPLETE CBC W/AUTO DIFF WBC: CPT | Performed by: EMERGENCY MEDICINE

## 2021-07-16 PROCEDURE — 25000003 PHARM REV CODE 250: Performed by: EMERGENCY MEDICINE

## 2021-07-16 PROCEDURE — U0002 COVID-19 LAB TEST NON-CDC: HCPCS | Performed by: EMERGENCY MEDICINE

## 2021-07-16 PROCEDURE — 81000 URINALYSIS NONAUTO W/SCOPE: CPT | Performed by: EMERGENCY MEDICINE

## 2021-07-16 PROCEDURE — 96375 TX/PRO/DX INJ NEW DRUG ADDON: CPT

## 2021-07-16 PROCEDURE — 99291 CRITICAL CARE FIRST HOUR: CPT | Mod: 25

## 2021-07-16 PROCEDURE — 93010 EKG 12-LEAD: ICD-10-PCS | Mod: ,,, | Performed by: INTERNAL MEDICINE

## 2021-07-16 RX ORDER — KETOROLAC TROMETHAMINE 30 MG/ML
15 INJECTION, SOLUTION INTRAMUSCULAR; INTRAVENOUS
Status: COMPLETED | OUTPATIENT
Start: 2021-07-16 | End: 2021-07-16

## 2021-07-16 RX ORDER — DILTIAZEM HYDROCHLORIDE 5 MG/ML
0.25 INJECTION INTRAVENOUS
Status: COMPLETED | OUTPATIENT
Start: 2021-07-16 | End: 2021-07-16

## 2021-07-16 RX ORDER — DILTIAZEM HYDROCHLORIDE 120 MG/1
120 CAPSULE, COATED, EXTENDED RELEASE ORAL DAILY
Qty: 30 CAPSULE | Refills: 11 | Status: ON HOLD | OUTPATIENT
Start: 2021-07-16 | End: 2021-08-26 | Stop reason: HOSPADM

## 2021-07-16 RX ORDER — DILTIAZEM HYDROCHLORIDE 120 MG/1
120 CAPSULE, COATED, EXTENDED RELEASE ORAL
Status: COMPLETED | OUTPATIENT
Start: 2021-07-16 | End: 2021-07-16

## 2021-07-16 RX ORDER — NAPROXEN SODIUM 220 MG/1
81 TABLET, FILM COATED ORAL DAILY
Qty: 30 TABLET | Refills: 11 | Status: SHIPPED | OUTPATIENT
Start: 2021-07-16 | End: 2021-09-17

## 2021-07-16 RX ADMIN — DILTIAZEM HYDROCHLORIDE 25 MG: 5 INJECTION INTRAVENOUS at 07:07

## 2021-07-16 RX ADMIN — KETOROLAC TROMETHAMINE 15 MG: 30 INJECTION, SOLUTION INTRAMUSCULAR; INTRAVENOUS at 07:07

## 2021-07-16 RX ADMIN — DILTIAZEM HYDROCHLORIDE 120 MG: 120 CAPSULE, COATED, EXTENDED RELEASE ORAL at 07:07

## 2021-07-20 ENCOUNTER — HOSPITAL ENCOUNTER (EMERGENCY)
Facility: HOSPITAL | Age: 61
Discharge: HOME | End: 2021-07-20
Attending: EMERGENCY MEDICINE
Payer: MEDICAID

## 2021-07-20 VITALS
TEMPERATURE: 98 F | WEIGHT: 220 LBS | RESPIRATION RATE: 18 BRPM | OXYGEN SATURATION: 95 % | HEART RATE: 107 BPM | SYSTOLIC BLOOD PRESSURE: 108 MMHG | DIASTOLIC BLOOD PRESSURE: 80 MMHG | BODY MASS INDEX: 35.36 KG/M2 | HEIGHT: 66 IN

## 2021-07-20 DIAGNOSIS — I48.91 ATRIAL FIBRILLATION WITH RAPID VENTRICULAR RESPONSE: Primary | ICD-10-CM

## 2021-07-20 DIAGNOSIS — R07.9 CHEST PAIN: ICD-10-CM

## 2021-07-20 DIAGNOSIS — R10.9 ABDOMINAL PAIN: ICD-10-CM

## 2021-07-20 LAB
ALBUMIN SERPL-MCNC: 3.6 G/DL (ref 3.3–5.5)
ALBUMIN SERPL-MCNC: 3.6 G/DL (ref 3.3–5.5)
ALP SERPL-CCNC: 64 U/L (ref 42–141)
ALP SERPL-CCNC: 66 U/L (ref 42–141)
BILIRUB SERPL-MCNC: 1.4 MG/DL (ref 0.2–1.6)
BILIRUB SERPL-MCNC: 1.5 MG/DL (ref 0.2–1.6)
BUN SERPL-MCNC: 17 MG/DL (ref 7–22)
CALCIUM SERPL-MCNC: 9.5 MG/DL (ref 8–10.3)
CHLORIDE SERPL-SCNC: 107 MMOL/L (ref 98–108)
CREAT SERPL-MCNC: 1.3 MG/DL (ref 0.6–1.2)
GLUCOSE SERPL-MCNC: 176 MG/DL (ref 73–118)
POC ALT (SGPT): 33 U/L (ref 10–47)
POC ALT (SGPT): 45 U/L (ref 10–47)
POC AMYLASE: 23 U/L (ref 14–97)
POC AST (SGOT): 36 U/L (ref 11–38)
POC AST (SGOT): 38 U/L (ref 11–38)
POC B-TYPE NATRIURETIC PEPTIDE: 811 PG/ML (ref 0–100)
POC CARDIAC TROPONIN I: 0.03 NG/ML
POC GGT: 59 U/L (ref 5–65)
POC TCO2: 29 MMOL/L (ref 18–33)
POTASSIUM BLD-SCNC: 4.1 MMOL/L (ref 3.6–5.1)
PROTEIN, POC: 6.4 G/DL (ref 6.4–8.1)
PROTEIN, POC: 6.5 G/DL (ref 6.4–8.1)
SAMPLE: NORMAL
SODIUM BLD-SCNC: 142 MMOL/L (ref 128–145)

## 2021-07-20 PROCEDURE — 93005 ELECTROCARDIOGRAM TRACING: CPT | Mod: ER

## 2021-07-20 PROCEDURE — 96374 THER/PROPH/DIAG INJ IV PUSH: CPT | Mod: ER

## 2021-07-20 PROCEDURE — 99285 EMERGENCY DEPT VISIT HI MDM: CPT | Mod: 25,ER

## 2021-07-20 PROCEDURE — 82040 ASSAY OF SERUM ALBUMIN: CPT | Mod: ER

## 2021-07-20 PROCEDURE — 81003 URINALYSIS AUTO W/O SCOPE: CPT | Mod: ER

## 2021-07-20 PROCEDURE — 93010 ELECTROCARDIOGRAM REPORT: CPT | Mod: ,,, | Performed by: INTERNAL MEDICINE

## 2021-07-20 PROCEDURE — 85025 COMPLETE CBC W/AUTO DIFF WBC: CPT | Mod: ER

## 2021-07-20 PROCEDURE — 25000003 PHARM REV CODE 250: Mod: ER | Performed by: EMERGENCY MEDICINE

## 2021-07-20 PROCEDURE — 96361 HYDRATE IV INFUSION ADD-ON: CPT | Mod: ER

## 2021-07-20 PROCEDURE — 80053 COMPREHEN METABOLIC PANEL: CPT | Mod: ER

## 2021-07-20 PROCEDURE — 93010 EKG 12-LEAD: ICD-10-PCS | Mod: ,,, | Performed by: INTERNAL MEDICINE

## 2021-07-20 PROCEDURE — 84484 ASSAY OF TROPONIN QUANT: CPT | Mod: ER

## 2021-07-20 PROCEDURE — 83880 ASSAY OF NATRIURETIC PEPTIDE: CPT | Mod: ER

## 2021-07-20 RX ORDER — DILTIAZEM HYDROCHLORIDE 5 MG/ML
0.25 INJECTION INTRAVENOUS
Status: COMPLETED | OUTPATIENT
Start: 2021-07-20 | End: 2021-07-20

## 2021-07-20 RX ADMIN — SODIUM CHLORIDE 1000 ML: 0.9 INJECTION, SOLUTION INTRAVENOUS at 11:07

## 2021-07-20 RX ADMIN — DILTIAZEM HYDROCHLORIDE 25 MG: 5 INJECTION INTRAVENOUS at 11:07

## 2021-08-09 ENCOUNTER — HOSPITAL ENCOUNTER (INPATIENT)
Facility: HOSPITAL | Age: 61
LOS: 1 days | Discharge: LEFT BEFORE DISCHARGE | DRG: 308 | End: 2021-08-10
Attending: EMERGENCY MEDICINE | Admitting: HOSPITALIST
Payer: MEDICAID

## 2021-08-09 DIAGNOSIS — R06.02 SOB (SHORTNESS OF BREATH): ICD-10-CM

## 2021-08-09 DIAGNOSIS — I48.0 PAROXYSMAL ATRIAL FIBRILLATION WITH RVR: ICD-10-CM

## 2021-08-09 DIAGNOSIS — Z95.828 STATUS POST PICC CENTRAL LINE PLACEMENT: ICD-10-CM

## 2021-08-09 DIAGNOSIS — I48.91 ATRIAL FIBRILLATION WITH RAPID VENTRICULAR RESPONSE: ICD-10-CM

## 2021-08-09 DIAGNOSIS — R07.9 CHEST PAIN: ICD-10-CM

## 2021-08-09 DIAGNOSIS — I48.91 A-FIB: ICD-10-CM

## 2021-08-09 PROBLEM — E66.9 OBESITY (BMI 30-39.9): Status: ACTIVE | Noted: 2021-08-09

## 2021-08-09 PROBLEM — R79.89 ELEVATED TROPONIN: Status: ACTIVE | Noted: 2021-08-09

## 2021-08-09 PROBLEM — F17.200 TOBACCO USE DISORDER: Status: ACTIVE | Noted: 2021-08-09

## 2021-08-09 PROBLEM — R79.89 ELEVATED BRAIN NATRIURETIC PEPTIDE (BNP) LEVEL: Status: ACTIVE | Noted: 2021-08-09

## 2021-08-09 PROBLEM — R57.9 SHOCK: Status: ACTIVE | Noted: 2021-08-09

## 2021-08-09 PROBLEM — I50.21 ACUTE SYSTOLIC CONGESTIVE HEART FAILURE: Status: ACTIVE | Noted: 2021-08-09

## 2021-08-09 LAB
ALBUMIN SERPL BCP-MCNC: 3.4 G/DL (ref 3.5–5.2)
ALP SERPL-CCNC: 69 U/L (ref 55–135)
ALT SERPL W/O P-5'-P-CCNC: 40 U/L (ref 10–44)
AMPHET+METHAMPHET UR QL: NEGATIVE
ANION GAP SERPL CALC-SCNC: 9 MMOL/L (ref 8–16)
AORTIC ROOT ANNULUS: 2.95 CM
AORTIC VALVE CUSP SEPERATION: 2.17 CM
ASCENDING AORTA: 3.35 CM
AST SERPL-CCNC: 32 U/L (ref 10–40)
AV INDEX (PROSTH): 1.01
AV MEAN GRADIENT: 1 MMHG
AV PEAK GRADIENT: 1 MMHG
AV VALVE AREA: 3.71 CM2
AV VELOCITY RATIO: 1.12
BACTERIA #/AREA URNS HPF: ABNORMAL /HPF
BARBITURATES UR QL SCN>200 NG/ML: NEGATIVE
BASOPHILS # BLD AUTO: 0.08 K/UL (ref 0–0.2)
BASOPHILS NFR BLD: 0.6 % (ref 0–1.9)
BENZODIAZ UR QL SCN>200 NG/ML: NEGATIVE
BILIRUB SERPL-MCNC: 1.9 MG/DL (ref 0.1–1)
BILIRUB UR QL STRIP: NEGATIVE
BNP SERPL-MCNC: 1528 PG/ML (ref 0–99)
BSA FOR ECHO PROCEDURE: 2.17 M2
BUN SERPL-MCNC: 23 MG/DL (ref 6–20)
BZE UR QL SCN: NEGATIVE
CALCIUM SERPL-MCNC: 9.7 MG/DL (ref 8.7–10.5)
CANNABINOIDS UR QL SCN: NEGATIVE
CHLORIDE SERPL-SCNC: 109 MMOL/L (ref 95–110)
CLARITY UR: CLEAR
CO2 SERPL-SCNC: 22 MMOL/L (ref 23–29)
COLOR UR: YELLOW
CREAT SERPL-MCNC: 1.2 MG/DL (ref 0.5–1.4)
CREAT UR-MCNC: 271 MG/DL (ref 23–375)
CTP QC/QA: YES
CV ECHO LV RWT: 0.45 CM
DIFFERENTIAL METHOD: ABNORMAL
DOP CALC AO PEAK VEL: 0.6 M/S
DOP CALC AO VTI: 10.96 CM
DOP CALC LVOT AREA: 3.7 CM2
DOP CALC LVOT DIAMETER: 2.16 CM
DOP CALC LVOT PEAK VEL: 0.67 M/S
DOP CALC LVOT STROKE VOLUME: 40.62 CM3
DOP CALCLVOT PEAK VEL VTI: 11.09 CM
ECHO LV POSTERIOR WALL: 1.25 CM (ref 0.6–1.1)
EJECTION FRACTION: 20 %
EOSINOPHIL # BLD AUTO: 0.1 K/UL (ref 0–0.5)
EOSINOPHIL NFR BLD: 0.5 % (ref 0–8)
ERYTHROCYTE [DISTWIDTH] IN BLOOD BY AUTOMATED COUNT: 15.2 % (ref 11.5–14.5)
EST. GFR  (AFRICAN AMERICAN): >60 ML/MIN/1.73 M^2
EST. GFR  (NON AFRICAN AMERICAN): >60 ML/MIN/1.73 M^2
ETHANOL SERPL-MCNC: <10 MG/DL
FRACTIONAL SHORTENING: 9 % (ref 28–44)
GLUCOSE SERPL-MCNC: 173 MG/DL (ref 70–110)
GLUCOSE UR QL STRIP: NEGATIVE
HCT VFR BLD AUTO: 53.8 % (ref 40–54)
HGB BLD-MCNC: 17.7 G/DL (ref 14–18)
HGB UR QL STRIP: NEGATIVE
HYALINE CASTS #/AREA URNS LPF: 23 /LPF
IMM GRANULOCYTES # BLD AUTO: 0.04 K/UL (ref 0–0.04)
IMM GRANULOCYTES NFR BLD AUTO: 0.3 % (ref 0–0.5)
INR PPP: 1.2 (ref 0.8–1.2)
INTERVENTRICULAR SEPTUM: 1.11 CM (ref 0.6–1.1)
IVRT: 78.43 MSEC
KETONES UR QL STRIP: NEGATIVE
LA MAJOR: 7.07 CM
LA MINOR: 6.18 CM
LA WIDTH: 4.01 CM
LACTATE SERPL-SCNC: 1.5 MMOL/L (ref 0.5–2.2)
LEFT ATRIUM SIZE: 4.5 CM
LEFT ATRIUM VOLUME INDEX: 47.9 ML/M2
LEFT ATRIUM VOLUME: 101.16 CM3
LEFT INTERNAL DIMENSION IN SYSTOLE: 5.11 CM (ref 2.1–4)
LEFT VENTRICLE DIASTOLIC VOLUME INDEX: 72.86 ML/M2
LEFT VENTRICLE DIASTOLIC VOLUME: 153.74 ML
LEFT VENTRICLE MASS INDEX: 130 G/M2
LEFT VENTRICLE SYSTOLIC VOLUME INDEX: 59 ML/M2
LEFT VENTRICLE SYSTOLIC VOLUME: 124.53 ML
LEFT VENTRICULAR INTERNAL DIMENSION IN DIASTOLE: 5.6 CM (ref 3.5–6)
LEFT VENTRICULAR MASS: 274.11 G
LEUKOCYTE ESTERASE UR QL STRIP: NEGATIVE
LYMPHOCYTES # BLD AUTO: 2.2 K/UL (ref 1–4.8)
LYMPHOCYTES NFR BLD: 17.1 % (ref 18–48)
MAGNESIUM SERPL-MCNC: 2 MG/DL (ref 1.6–2.6)
MCH RBC QN AUTO: 30.2 PG (ref 27–31)
MCHC RBC AUTO-ENTMCNC: 32.9 G/DL (ref 32–36)
MCV RBC AUTO: 92 FL (ref 82–98)
METHADONE UR QL SCN>300 NG/ML: NEGATIVE
MICROSCOPIC COMMENT: ABNORMAL
MONOCYTES # BLD AUTO: 0.8 K/UL (ref 0.3–1)
MONOCYTES NFR BLD: 6 % (ref 4–15)
MV PEAK E VEL: 0.56 M/S
NEUTROPHILS # BLD AUTO: 9.8 K/UL (ref 1.8–7.7)
NEUTROPHILS NFR BLD: 75.5 % (ref 38–73)
NITRITE UR QL STRIP: NEGATIVE
NRBC BLD-RTO: 0 /100 WBC
OPIATES UR QL SCN: NEGATIVE
PCP UR QL SCN>25 NG/ML: NEGATIVE
PH UR STRIP: 6 [PH] (ref 5–8)
PHOSPHATE SERPL-MCNC: 4.5 MG/DL (ref 2.7–4.5)
PISA TR MAX VEL: 1.75 M/S
PLATELET # BLD AUTO: 264 K/UL (ref 150–450)
PMV BLD AUTO: 10.7 FL (ref 9.2–12.9)
POCT GLUCOSE: 147 MG/DL (ref 70–110)
POTASSIUM SERPL-SCNC: 4.7 MMOL/L (ref 3.5–5.1)
PROT SERPL-MCNC: 6.1 G/DL (ref 6–8.4)
PROT UR QL STRIP: ABNORMAL
PROTHROMBIN TIME: 13 SEC (ref 9–12.5)
PV PEAK VELOCITY: 0.66 CM/S
RA MAJOR: 7.06 CM
RA PRESSURE: 8 MMHG
RA WIDTH: 5.3 CM
RBC # BLD AUTO: 5.86 M/UL (ref 4.6–6.2)
RBC #/AREA URNS HPF: 1 /HPF (ref 0–4)
RIGHT VENTRICULAR END-DIASTOLIC DIMENSION: 5.34 CM
RV TISSUE DOPPLER FREE WALL SYSTOLIC VELOCITY 1 (APICAL 4 CHAMBER VIEW): 6.36 CM/S
SARS-COV-2 RDRP RESP QL NAA+PROBE: NEGATIVE
SINUS: 3.41 CM
SODIUM SERPL-SCNC: 140 MMOL/L (ref 136–145)
SP GR UR STRIP: 1.03 (ref 1–1.03)
STJ: 2.59 CM
T4 FREE SERPL-MCNC: 1.12 NG/DL (ref 0.71–1.51)
TOXICOLOGY INFORMATION: NORMAL
TR MAX PG: 12 MMHG
TRICUSPID ANNULAR PLANE SYSTOLIC EXCURSION: 0.95 CM
TROPONIN I SERPL DL<=0.01 NG/ML-MCNC: 0.06 NG/ML (ref 0–0.03)
TSH SERPL DL<=0.005 MIU/L-ACNC: 2.73 UIU/ML (ref 0.4–4)
TV REST PULMONARY ARTERY PRESSURE: 20 MMHG
URN SPEC COLLECT METH UR: ABNORMAL
UROBILINOGEN UR STRIP-ACNC: NEGATIVE EU/DL
WBC # BLD AUTO: 12.99 K/UL (ref 3.9–12.7)
WBC #/AREA URNS HPF: 9 /HPF (ref 0–5)
WBC CLUMPS URNS QL MICRO: ABNORMAL

## 2021-08-09 PROCEDURE — 93005 ELECTROCARDIOGRAM TRACING: CPT

## 2021-08-09 PROCEDURE — U0002 COVID-19 LAB TEST NON-CDC: HCPCS | Performed by: EMERGENCY MEDICINE

## 2021-08-09 PROCEDURE — 63600175 PHARM REV CODE 636 W HCPCS: Performed by: HOSPITALIST

## 2021-08-09 PROCEDURE — 12000002 HC ACUTE/MED SURGE SEMI-PRIVATE ROOM

## 2021-08-09 PROCEDURE — 25000003 PHARM REV CODE 250: Performed by: HOSPITALIST

## 2021-08-09 PROCEDURE — 80053 COMPREHEN METABOLIC PANEL: CPT | Performed by: PHYSICIAN ASSISTANT

## 2021-08-09 PROCEDURE — 84443 ASSAY THYROID STIM HORMONE: CPT | Performed by: PHYSICIAN ASSISTANT

## 2021-08-09 PROCEDURE — 25000003 PHARM REV CODE 250: Performed by: EMERGENCY MEDICINE

## 2021-08-09 PROCEDURE — 51702 INSERT TEMP BLADDER CATH: CPT

## 2021-08-09 PROCEDURE — 96361 HYDRATE IV INFUSION ADD-ON: CPT

## 2021-08-09 PROCEDURE — 84484 ASSAY OF TROPONIN QUANT: CPT | Performed by: PHYSICIAN ASSISTANT

## 2021-08-09 PROCEDURE — 63600175 PHARM REV CODE 636 W HCPCS: Performed by: EMERGENCY MEDICINE

## 2021-08-09 PROCEDURE — 84100 ASSAY OF PHOSPHORUS: CPT | Performed by: PHYSICIAN ASSISTANT

## 2021-08-09 PROCEDURE — 93010 ELECTROCARDIOGRAM REPORT: CPT | Mod: ,,, | Performed by: INTERNAL MEDICINE

## 2021-08-09 PROCEDURE — 93010 EKG 12-LEAD: ICD-10-PCS | Mod: ,,, | Performed by: INTERNAL MEDICINE

## 2021-08-09 PROCEDURE — 96375 TX/PRO/DX INJ NEW DRUG ADDON: CPT

## 2021-08-09 PROCEDURE — 96374 THER/PROPH/DIAG INJ IV PUSH: CPT

## 2021-08-09 PROCEDURE — 63600175 PHARM REV CODE 636 W HCPCS: Performed by: INTERNAL MEDICINE

## 2021-08-09 PROCEDURE — 99285 EMERGENCY DEPT VISIT HI MDM: CPT | Mod: 25

## 2021-08-09 PROCEDURE — C1751 CATH, INF, PER/CENT/MIDLINE: HCPCS

## 2021-08-09 PROCEDURE — 83735 ASSAY OF MAGNESIUM: CPT | Performed by: PHYSICIAN ASSISTANT

## 2021-08-09 PROCEDURE — 81000 URINALYSIS NONAUTO W/SCOPE: CPT | Mod: 59 | Performed by: HOSPITALIST

## 2021-08-09 PROCEDURE — A4216 STERILE WATER/SALINE, 10 ML: HCPCS | Performed by: HOSPITALIST

## 2021-08-09 PROCEDURE — 80307 DRUG TEST PRSMV CHEM ANLYZR: CPT | Performed by: HOSPITALIST

## 2021-08-09 PROCEDURE — 83036 HEMOGLOBIN GLYCOSYLATED A1C: CPT | Performed by: HOSPITALIST

## 2021-08-09 PROCEDURE — 36569 INSJ PICC 5 YR+ W/O IMAGING: CPT

## 2021-08-09 PROCEDURE — 83880 ASSAY OF NATRIURETIC PEPTIDE: CPT | Performed by: PHYSICIAN ASSISTANT

## 2021-08-09 PROCEDURE — 99291 CRITICAL CARE FIRST HOUR: CPT | Mod: 25,,, | Performed by: INTERNAL MEDICINE

## 2021-08-09 PROCEDURE — 51798 US URINE CAPACITY MEASURE: CPT

## 2021-08-09 PROCEDURE — 99291 PR CRITICAL CARE, E/M 30-74 MINUTES: ICD-10-PCS | Mod: 25,,, | Performed by: INTERNAL MEDICINE

## 2021-08-09 PROCEDURE — 82077 ASSAY SPEC XCP UR&BREATH IA: CPT | Performed by: PHYSICIAN ASSISTANT

## 2021-08-09 PROCEDURE — 85025 COMPLETE CBC W/AUTO DIFF WBC: CPT | Performed by: PHYSICIAN ASSISTANT

## 2021-08-09 PROCEDURE — 83605 ASSAY OF LACTIC ACID: CPT | Performed by: HOSPITALIST

## 2021-08-09 PROCEDURE — 84439 ASSAY OF FREE THYROXINE: CPT | Performed by: PHYSICIAN ASSISTANT

## 2021-08-09 PROCEDURE — 85610 PROTHROMBIN TIME: CPT | Performed by: PHYSICIAN ASSISTANT

## 2021-08-09 RX ORDER — AMOXICILLIN 250 MG
1 CAPSULE ORAL 2 TIMES DAILY PRN
Status: DISCONTINUED | OUTPATIENT
Start: 2021-08-09 | End: 2021-08-10 | Stop reason: HOSPADM

## 2021-08-09 RX ORDER — SODIUM,POTASSIUM PHOSPHATES 280-250MG
2 POWDER IN PACKET (EA) ORAL
Status: DISCONTINUED | OUTPATIENT
Start: 2021-08-09 | End: 2021-08-10 | Stop reason: HOSPADM

## 2021-08-09 RX ORDER — DIGOXIN 0.25 MG/ML
500 INJECTION INTRAMUSCULAR; INTRAVENOUS ONCE
Status: COMPLETED | OUTPATIENT
Start: 2021-08-09 | End: 2021-08-09

## 2021-08-09 RX ORDER — IBUPROFEN 200 MG
1 TABLET ORAL DAILY
Status: DISCONTINUED | OUTPATIENT
Start: 2021-08-10 | End: 2021-08-10 | Stop reason: HOSPADM

## 2021-08-09 RX ORDER — DIGOXIN 250 MCG
0.25 TABLET ORAL DAILY
Status: DISCONTINUED | OUTPATIENT
Start: 2021-08-10 | End: 2021-08-10 | Stop reason: HOSPADM

## 2021-08-09 RX ORDER — DIAZEPAM 10 MG/2ML
2.5 INJECTION INTRAMUSCULAR
Status: COMPLETED | OUTPATIENT
Start: 2021-08-09 | End: 2021-08-09

## 2021-08-09 RX ORDER — SODIUM CHLORIDE 0.9 % (FLUSH) 0.9 %
10 SYRINGE (ML) INJECTION EVERY 6 HOURS
Status: DISCONTINUED | OUTPATIENT
Start: 2021-08-10 | End: 2021-08-10 | Stop reason: HOSPADM

## 2021-08-09 RX ORDER — LORAZEPAM 2 MG/ML
0.5 INJECTION INTRAMUSCULAR
Status: DISCONTINUED | OUTPATIENT
Start: 2021-08-09 | End: 2021-08-09

## 2021-08-09 RX ORDER — SODIUM CHLORIDE 0.9 % (FLUSH) 0.9 %
10 SYRINGE (ML) INJECTION EVERY 8 HOURS
Status: DISCONTINUED | OUTPATIENT
Start: 2021-08-09 | End: 2021-08-10 | Stop reason: HOSPADM

## 2021-08-09 RX ORDER — DIAZEPAM 10 MG/2ML
5 INJECTION INTRAMUSCULAR
Status: COMPLETED | OUTPATIENT
Start: 2021-08-09 | End: 2021-08-09

## 2021-08-09 RX ORDER — IBUPROFEN 200 MG
16 TABLET ORAL
Status: DISCONTINUED | OUTPATIENT
Start: 2021-08-09 | End: 2021-08-10 | Stop reason: HOSPADM

## 2021-08-09 RX ORDER — PROCHLORPERAZINE EDISYLATE 5 MG/ML
8 INJECTION INTRAMUSCULAR; INTRAVENOUS EVERY 6 HOURS PRN
Status: DISCONTINUED | OUTPATIENT
Start: 2021-08-09 | End: 2021-08-10 | Stop reason: HOSPADM

## 2021-08-09 RX ORDER — ONDANSETRON 2 MG/ML
4 INJECTION INTRAMUSCULAR; INTRAVENOUS
Status: COMPLETED | OUTPATIENT
Start: 2021-08-09 | End: 2021-08-09

## 2021-08-09 RX ORDER — TALC
6 POWDER (GRAM) TOPICAL NIGHTLY PRN
Status: DISCONTINUED | OUTPATIENT
Start: 2021-08-09 | End: 2021-08-10 | Stop reason: HOSPADM

## 2021-08-09 RX ORDER — GLUCAGON 1 MG
1 KIT INJECTION
Status: DISCONTINUED | OUTPATIENT
Start: 2021-08-09 | End: 2021-08-10 | Stop reason: HOSPADM

## 2021-08-09 RX ORDER — NOREPINEPHRINE BITARTRATE/D5W 4MG/250ML
0.05 PLASTIC BAG, INJECTION (ML) INTRAVENOUS CONTINUOUS
Status: DISCONTINUED | OUTPATIENT
Start: 2021-08-09 | End: 2021-08-09

## 2021-08-09 RX ORDER — METOPROLOL TARTRATE 25 MG/1
25 TABLET, FILM COATED ORAL
Status: COMPLETED | OUTPATIENT
Start: 2021-08-09 | End: 2021-08-09

## 2021-08-09 RX ORDER — LANOLIN ALCOHOL/MO/W.PET/CERES
800 CREAM (GRAM) TOPICAL
Status: DISCONTINUED | OUTPATIENT
Start: 2021-08-09 | End: 2021-08-10 | Stop reason: HOSPADM

## 2021-08-09 RX ORDER — ENOXAPARIN SODIUM 100 MG/ML
1 INJECTION SUBCUTANEOUS
Status: DISCONTINUED | OUTPATIENT
Start: 2021-08-09 | End: 2021-08-10

## 2021-08-09 RX ORDER — SODIUM CHLORIDE 0.9 % (FLUSH) 0.9 %
10 SYRINGE (ML) INJECTION
Status: DISCONTINUED | OUTPATIENT
Start: 2021-08-09 | End: 2021-08-10 | Stop reason: HOSPADM

## 2021-08-09 RX ORDER — DIGOXIN 0.25 MG/ML
250 INJECTION INTRAMUSCULAR; INTRAVENOUS ONCE
Status: COMPLETED | OUTPATIENT
Start: 2021-08-09 | End: 2021-08-09

## 2021-08-09 RX ORDER — ONDANSETRON 2 MG/ML
4 INJECTION INTRAMUSCULAR; INTRAVENOUS EVERY 8 HOURS PRN
Status: DISCONTINUED | OUTPATIENT
Start: 2021-08-09 | End: 2021-08-10 | Stop reason: HOSPADM

## 2021-08-09 RX ORDER — METOPROLOL TARTRATE 1 MG/ML
5 INJECTION, SOLUTION INTRAVENOUS EVERY 5 MIN PRN
Status: DISCONTINUED | OUTPATIENT
Start: 2021-08-09 | End: 2021-08-09

## 2021-08-09 RX ORDER — IBUPROFEN 200 MG
24 TABLET ORAL
Status: DISCONTINUED | OUTPATIENT
Start: 2021-08-09 | End: 2021-08-10 | Stop reason: HOSPADM

## 2021-08-09 RX ORDER — ACETAMINOPHEN 325 MG/1
650 TABLET ORAL EVERY 4 HOURS PRN
Status: DISCONTINUED | OUTPATIENT
Start: 2021-08-09 | End: 2021-08-10 | Stop reason: HOSPADM

## 2021-08-09 RX ADMIN — PHENYLEPHRINE HYDROCHLORIDE 1 MCG/KG/MIN: 10 INJECTION INTRAVENOUS at 04:08

## 2021-08-09 RX ADMIN — DIAZEPAM 5 MG: 5 INJECTION, SOLUTION INTRAMUSCULAR; INTRAVENOUS at 08:08

## 2021-08-09 RX ADMIN — ENOXAPARIN SODIUM 100 MG: 60 INJECTION SUBCUTANEOUS at 02:08

## 2021-08-09 RX ADMIN — PHENYLEPHRINE HYDROCHLORIDE 4 MCG/KG/MIN: 10 INJECTION INTRAVENOUS at 05:08

## 2021-08-09 RX ADMIN — ONDANSETRON 4 MG: 2 INJECTION INTRAMUSCULAR; INTRAVENOUS at 08:08

## 2021-08-09 RX ADMIN — DIGOXIN 250 MCG: 0.25 INJECTION INTRAMUSCULAR; INTRAVENOUS at 09:08

## 2021-08-09 RX ADMIN — METOPROLOL TARTRATE 25 MG: 25 TABLET, FILM COATED ORAL at 12:08

## 2021-08-09 RX ADMIN — DIAZEPAM 2.5 MG: 5 INJECTION, SOLUTION INTRAMUSCULAR; INTRAVENOUS at 02:08

## 2021-08-09 RX ADMIN — DIGOXIN 500 MCG: 0.25 INJECTION INTRAMUSCULAR; INTRAVENOUS at 06:08

## 2021-08-09 RX ADMIN — Medication 0.05 MCG/KG/MIN: at 01:08

## 2021-08-09 RX ADMIN — PHENYLEPHRINE HYDROCHLORIDE 4 MCG/KG/MIN: 10 INJECTION INTRAVENOUS at 07:08

## 2021-08-09 RX ADMIN — SODIUM CHLORIDE 1000 ML: 0.9 INJECTION, SOLUTION INTRAVENOUS at 12:08

## 2021-08-09 RX ADMIN — Medication 10 ML: at 10:08

## 2021-08-09 RX ADMIN — AMIODARONE HYDROCHLORIDE 1 MG/MIN: 1.8 INJECTION, SOLUTION INTRAVENOUS at 02:08

## 2021-08-09 RX ADMIN — ONDANSETRON 4 MG: 2 INJECTION INTRAMUSCULAR; INTRAVENOUS at 12:08

## 2021-08-09 RX ADMIN — Medication 10 ML: at 05:08

## 2021-08-09 RX ADMIN — PHENYLEPHRINE HYDROCHLORIDE 4 MCG/KG/MIN: 10 INJECTION INTRAVENOUS at 06:08

## 2021-08-09 RX ADMIN — AMIODARONE HYDROCHLORIDE 150 MG: 1.5 INJECTION, SOLUTION INTRAVENOUS at 01:08

## 2021-08-10 VITALS
BODY MASS INDEX: 34.53 KG/M2 | HEIGHT: 67 IN | SYSTOLIC BLOOD PRESSURE: 109 MMHG | WEIGHT: 220 LBS | TEMPERATURE: 98 F | DIASTOLIC BLOOD PRESSURE: 89 MMHG | RESPIRATION RATE: 16 BRPM | HEART RATE: 84 BPM | OXYGEN SATURATION: 95 %

## 2021-08-10 LAB
ALBUMIN SERPL BCP-MCNC: 3 G/DL (ref 3.5–5.2)
ALP SERPL-CCNC: 70 U/L (ref 55–135)
ALT SERPL W/O P-5'-P-CCNC: 197 U/L (ref 10–44)
ANION GAP SERPL CALC-SCNC: 8 MMOL/L (ref 8–16)
AST SERPL-CCNC: 232 U/L (ref 10–40)
BASOPHILS # BLD AUTO: 0.03 K/UL (ref 0–0.2)
BASOPHILS NFR BLD: 0.2 % (ref 0–1.9)
BILIRUB SERPL-MCNC: 1 MG/DL (ref 0.1–1)
BUN SERPL-MCNC: 28 MG/DL (ref 6–20)
CALCIUM SERPL-MCNC: 8.7 MG/DL (ref 8.7–10.5)
CHLORIDE SERPL-SCNC: 111 MMOL/L (ref 95–110)
CO2 SERPL-SCNC: 22 MMOL/L (ref 23–29)
CREAT SERPL-MCNC: 1.7 MG/DL (ref 0.5–1.4)
DIFFERENTIAL METHOD: ABNORMAL
EOSINOPHIL # BLD AUTO: 0 K/UL (ref 0–0.5)
EOSINOPHIL NFR BLD: 0 % (ref 0–8)
ERYTHROCYTE [DISTWIDTH] IN BLOOD BY AUTOMATED COUNT: 15.2 % (ref 11.5–14.5)
EST. GFR  (AFRICAN AMERICAN): 50 ML/MIN/1.73 M^2
EST. GFR  (NON AFRICAN AMERICAN): 43 ML/MIN/1.73 M^2
ESTIMATED AVG GLUCOSE: 117 MG/DL (ref 68–131)
GLUCOSE SERPL-MCNC: 126 MG/DL (ref 70–110)
HBA1C MFR BLD: 5.7 % (ref 4–5.6)
HCT VFR BLD AUTO: 52 % (ref 40–54)
HGB BLD-MCNC: 17.1 G/DL (ref 14–18)
IMM GRANULOCYTES # BLD AUTO: 0.07 K/UL (ref 0–0.04)
IMM GRANULOCYTES NFR BLD AUTO: 0.5 % (ref 0–0.5)
LYMPHOCYTES # BLD AUTO: 1.9 K/UL (ref 1–4.8)
LYMPHOCYTES NFR BLD: 13.2 % (ref 18–48)
MAGNESIUM SERPL-MCNC: 1.9 MG/DL (ref 1.6–2.6)
MCH RBC QN AUTO: 30.5 PG (ref 27–31)
MCHC RBC AUTO-ENTMCNC: 32.9 G/DL (ref 32–36)
MCV RBC AUTO: 93 FL (ref 82–98)
MONOCYTES # BLD AUTO: 1.5 K/UL (ref 0.3–1)
MONOCYTES NFR BLD: 10.2 % (ref 4–15)
NEUTROPHILS # BLD AUTO: 10.9 K/UL (ref 1.8–7.7)
NEUTROPHILS NFR BLD: 75.9 % (ref 38–73)
NRBC BLD-RTO: 0 /100 WBC
PLATELET # BLD AUTO: 220 K/UL (ref 150–450)
PMV BLD AUTO: 10.9 FL (ref 9.2–12.9)
POTASSIUM SERPL-SCNC: 4.6 MMOL/L (ref 3.5–5.1)
PROT SERPL-MCNC: 5.2 G/DL (ref 6–8.4)
RBC # BLD AUTO: 5.61 M/UL (ref 4.6–6.2)
SODIUM SERPL-SCNC: 141 MMOL/L (ref 136–145)
WBC # BLD AUTO: 14.37 K/UL (ref 3.9–12.7)

## 2021-08-10 PROCEDURE — 25000003 PHARM REV CODE 250: Performed by: HOSPITALIST

## 2021-08-10 PROCEDURE — 85025 COMPLETE CBC W/AUTO DIFF WBC: CPT | Performed by: HOSPITALIST

## 2021-08-10 PROCEDURE — 63600175 PHARM REV CODE 636 W HCPCS: Performed by: INTERNAL MEDICINE

## 2021-08-10 PROCEDURE — A4216 STERILE WATER/SALINE, 10 ML: HCPCS | Performed by: HOSPITALIST

## 2021-08-10 PROCEDURE — 80053 COMPREHEN METABOLIC PANEL: CPT | Performed by: HOSPITALIST

## 2021-08-10 PROCEDURE — 63600175 PHARM REV CODE 636 W HCPCS: Performed by: HOSPITALIST

## 2021-08-10 PROCEDURE — 83735 ASSAY OF MAGNESIUM: CPT | Performed by: HOSPITALIST

## 2021-08-10 RX ORDER — ENOXAPARIN SODIUM 100 MG/ML
1 INJECTION SUBCUTANEOUS
Status: DISCONTINUED | OUTPATIENT
Start: 2021-08-10 | End: 2021-08-10 | Stop reason: HOSPADM

## 2021-08-10 RX ORDER — DIAZEPAM 10 MG/2ML
2.5 INJECTION INTRAMUSCULAR EVERY 8 HOURS PRN
Status: DISCONTINUED | OUTPATIENT
Start: 2021-08-10 | End: 2021-08-10 | Stop reason: HOSPADM

## 2021-08-10 RX ADMIN — Medication 10 ML: at 12:08

## 2021-08-10 RX ADMIN — Medication 10 ML: at 05:08

## 2021-08-10 RX ADMIN — DIAZEPAM 2.5 MG: 5 INJECTION, SOLUTION INTRAMUSCULAR; INTRAVENOUS at 12:08

## 2021-08-10 RX ADMIN — ENOXAPARIN SODIUM 100 MG: 100 INJECTION, SOLUTION INTRAVENOUS; SUBCUTANEOUS at 06:08

## 2021-08-24 ENCOUNTER — HOSPITAL ENCOUNTER (INPATIENT)
Facility: HOSPITAL | Age: 61
LOS: 2 days | Discharge: HOME | DRG: 308 | End: 2021-08-26
Attending: EMERGENCY MEDICINE | Admitting: HOSPITALIST
Payer: MEDICAID

## 2021-08-24 DIAGNOSIS — I48.91 ATRIAL FIBRILLATION: ICD-10-CM

## 2021-08-24 DIAGNOSIS — R06.02 SOB (SHORTNESS OF BREATH): ICD-10-CM

## 2021-08-24 DIAGNOSIS — R07.9 CHEST PAIN: ICD-10-CM

## 2021-08-24 DIAGNOSIS — I48.91 ATRIAL FIBRILLATION STATUS POST CARDIOVERSION: ICD-10-CM

## 2021-08-24 DIAGNOSIS — I48.0 PAROXYSMAL ATRIAL FIBRILLATION WITH RVR: Primary | ICD-10-CM

## 2021-08-24 DIAGNOSIS — F41.9 ANXIETY: ICD-10-CM

## 2021-08-24 DIAGNOSIS — I48.91 ATRIAL FIBRILLATION, UNSPECIFIED TYPE: ICD-10-CM

## 2021-08-24 DIAGNOSIS — I50.41 ACUTE COMBINED SYSTOLIC AND DIASTOLIC CONGESTIVE HEART FAILURE: ICD-10-CM

## 2021-08-24 DIAGNOSIS — F41.1 ANXIETY REACTION: ICD-10-CM

## 2021-08-24 DIAGNOSIS — R00.2 PALPITATIONS: ICD-10-CM

## 2021-08-24 DIAGNOSIS — I48.0 PAROXYSMAL A-FIB: ICD-10-CM

## 2021-08-24 LAB
ALBUMIN SERPL BCP-MCNC: 3.3 G/DL (ref 3.5–5.2)
ALP SERPL-CCNC: 76 U/L (ref 55–135)
ALT SERPL W/O P-5'-P-CCNC: 42 U/L (ref 10–44)
ANION GAP SERPL CALC-SCNC: 9 MMOL/L (ref 8–16)
AST SERPL-CCNC: 37 U/L (ref 10–40)
BASOPHILS # BLD AUTO: 0.11 K/UL (ref 0–0.2)
BASOPHILS NFR BLD: 0.9 % (ref 0–1.9)
BILIRUB SERPL-MCNC: 1.4 MG/DL (ref 0.1–1)
BNP SERPL-MCNC: 1713 PG/ML (ref 0–99)
BUN SERPL-MCNC: 22 MG/DL (ref 6–20)
CALCIUM SERPL-MCNC: 9.8 MG/DL (ref 8.7–10.5)
CHLORIDE SERPL-SCNC: 104 MMOL/L (ref 95–110)
CO2 SERPL-SCNC: 24 MMOL/L (ref 23–29)
CREAT SERPL-MCNC: 1.1 MG/DL (ref 0.5–1.4)
CTP QC/QA: YES
DIFFERENTIAL METHOD: ABNORMAL
EOSINOPHIL # BLD AUTO: 0.2 K/UL (ref 0–0.5)
EOSINOPHIL NFR BLD: 1.5 % (ref 0–8)
ERYTHROCYTE [DISTWIDTH] IN BLOOD BY AUTOMATED COUNT: 15.3 % (ref 11.5–14.5)
EST. GFR  (AFRICAN AMERICAN): >60 ML/MIN/1.73 M^2
EST. GFR  (NON AFRICAN AMERICAN): >60 ML/MIN/1.73 M^2
GLUCOSE SERPL-MCNC: 103 MG/DL (ref 70–110)
HCT VFR BLD AUTO: 51.8 % (ref 40–54)
HGB BLD-MCNC: 17.2 G/DL (ref 14–18)
IMM GRANULOCYTES # BLD AUTO: 0.03 K/UL (ref 0–0.04)
IMM GRANULOCYTES NFR BLD AUTO: 0.2 % (ref 0–0.5)
LYMPHOCYTES # BLD AUTO: 3.9 K/UL (ref 1–4.8)
LYMPHOCYTES NFR BLD: 29.9 % (ref 18–48)
MCH RBC QN AUTO: 30 PG (ref 27–31)
MCHC RBC AUTO-ENTMCNC: 33.2 G/DL (ref 32–36)
MCV RBC AUTO: 90 FL (ref 82–98)
MONOCYTES # BLD AUTO: 1.1 K/UL (ref 0.3–1)
MONOCYTES NFR BLD: 8.8 % (ref 4–15)
NEUTROPHILS # BLD AUTO: 7.6 K/UL (ref 1.8–7.7)
NEUTROPHILS NFR BLD: 58.7 % (ref 38–73)
NRBC BLD-RTO: 0 /100 WBC
PLATELET # BLD AUTO: 273 K/UL (ref 150–450)
PMV BLD AUTO: 11.7 FL (ref 9.2–12.9)
POTASSIUM SERPL-SCNC: 4.3 MMOL/L (ref 3.5–5.1)
PROT SERPL-MCNC: 5.8 G/DL (ref 6–8.4)
RBC # BLD AUTO: 5.74 M/UL (ref 4.6–6.2)
SARS-COV-2 RDRP RESP QL NAA+PROBE: NEGATIVE
SODIUM SERPL-SCNC: 137 MMOL/L (ref 136–145)
TROPONIN I SERPL DL<=0.01 NG/ML-MCNC: 0.09 NG/ML (ref 0–0.03)
WBC # BLD AUTO: 12.89 K/UL (ref 3.9–12.7)

## 2021-08-24 PROCEDURE — 25000003 PHARM REV CODE 250: Performed by: EMERGENCY MEDICINE

## 2021-08-24 PROCEDURE — 87389 HIV-1 AG W/HIV-1&-2 AB AG IA: CPT | Performed by: EMERGENCY MEDICINE

## 2021-08-24 PROCEDURE — 99291 CRITICAL CARE FIRST HOUR: CPT | Mod: CR,CS,, | Performed by: EMERGENCY MEDICINE

## 2021-08-24 PROCEDURE — 96365 THER/PROPH/DIAG IV INF INIT: CPT | Mod: 59

## 2021-08-24 PROCEDURE — 84484 ASSAY OF TROPONIN QUANT: CPT | Performed by: EMERGENCY MEDICINE

## 2021-08-24 PROCEDURE — 93005 ELECTROCARDIOGRAM TRACING: CPT

## 2021-08-24 PROCEDURE — 99291 PR CRITICAL CARE, E/M 30-74 MINUTES: ICD-10-PCS | Mod: CR,CS,, | Performed by: EMERGENCY MEDICINE

## 2021-08-24 PROCEDURE — 81001 URINALYSIS AUTO W/SCOPE: CPT

## 2021-08-24 PROCEDURE — 96375 TX/PRO/DX INJ NEW DRUG ADDON: CPT

## 2021-08-24 PROCEDURE — 93010 ELECTROCARDIOGRAM REPORT: CPT | Mod: ,,, | Performed by: INTERNAL MEDICINE

## 2021-08-24 PROCEDURE — 80053 COMPREHEN METABOLIC PANEL: CPT | Performed by: EMERGENCY MEDICINE

## 2021-08-24 PROCEDURE — 80307 DRUG TEST PRSMV CHEM ANLYZR: CPT

## 2021-08-24 PROCEDURE — 63600175 PHARM REV CODE 636 W HCPCS: Performed by: EMERGENCY MEDICINE

## 2021-08-24 PROCEDURE — 93010 EKG 12-LEAD: ICD-10-PCS | Mod: ,,, | Performed by: INTERNAL MEDICINE

## 2021-08-24 PROCEDURE — U0002 COVID-19 LAB TEST NON-CDC: HCPCS

## 2021-08-24 PROCEDURE — 85025 COMPLETE CBC W/AUTO DIFF WBC: CPT | Performed by: EMERGENCY MEDICINE

## 2021-08-24 PROCEDURE — 83880 ASSAY OF NATRIURETIC PEPTIDE: CPT | Performed by: EMERGENCY MEDICINE

## 2021-08-24 PROCEDURE — 12000002 HC ACUTE/MED SURGE SEMI-PRIVATE ROOM

## 2021-08-24 PROCEDURE — 86803 HEPATITIS C AB TEST: CPT | Performed by: EMERGENCY MEDICINE

## 2021-08-24 PROCEDURE — 99291 CRITICAL CARE FIRST HOUR: CPT | Mod: 25

## 2021-08-24 RX ORDER — DIGOXIN 0.25 MG/ML
500 INJECTION INTRAMUSCULAR; INTRAVENOUS ONCE
Status: DISCONTINUED | OUTPATIENT
Start: 2021-08-25 | End: 2021-08-25

## 2021-08-24 RX ORDER — DIGOXIN 0.25 MG/ML
250 INJECTION INTRAMUSCULAR; INTRAVENOUS EVERY 6 HOURS
Status: DISCONTINUED | OUTPATIENT
Start: 2021-08-25 | End: 2021-08-25

## 2021-08-24 RX ORDER — FUROSEMIDE 10 MG/ML
80 INJECTION INTRAMUSCULAR; INTRAVENOUS
Status: COMPLETED | OUTPATIENT
Start: 2021-08-24 | End: 2021-08-24

## 2021-08-24 RX ORDER — DILTIAZEM HYDROCHLORIDE 5 MG/ML
5 INJECTION INTRAVENOUS ONCE
Status: CANCELLED | OUTPATIENT
Start: 2021-08-24

## 2021-08-24 RX ORDER — AMIODARONE HYDROCHLORIDE 150 MG/3ML
150 INJECTION, SOLUTION INTRAVENOUS
Status: DISCONTINUED | OUTPATIENT
Start: 2021-08-24 | End: 2021-08-24

## 2021-08-24 RX ADMIN — AMIODARONE HYDROCHLORIDE 150 MG: 1.5 INJECTION, SOLUTION INTRAVENOUS at 10:08

## 2021-08-24 RX ADMIN — APIXABAN 5 MG: 5 TABLET, FILM COATED ORAL at 10:08

## 2021-08-24 RX ADMIN — FUROSEMIDE 80 MG: 10 INJECTION, SOLUTION INTRAMUSCULAR; INTRAVENOUS at 10:08

## 2021-08-24 RX ADMIN — AMIODARONE HYDROCHLORIDE 1 MG/MIN: 1.8 INJECTION, SOLUTION INTRAVENOUS at 11:08

## 2021-08-25 PROBLEM — F41.9 ANXIETY: Status: ACTIVE | Noted: 2021-08-25

## 2021-08-25 PROBLEM — I50.41 ACUTE COMBINED SYSTOLIC AND DIASTOLIC CONGESTIVE HEART FAILURE: Status: ACTIVE | Noted: 2021-08-09

## 2021-08-25 PROBLEM — D72.829 LEUKOCYTOSIS: Status: ACTIVE | Noted: 2021-08-25

## 2021-08-25 PROBLEM — R73.03 PREDIABETES: Status: ACTIVE | Noted: 2021-08-25

## 2021-08-25 LAB
AMPHET+METHAMPHET UR QL: NEGATIVE
BACTERIA #/AREA URNS AUTO: ABNORMAL /HPF
BARBITURATES UR QL SCN>200 NG/ML: NEGATIVE
BENZODIAZ UR QL SCN>200 NG/ML: NEGATIVE
BILIRUB UR QL STRIP: NEGATIVE
BZE UR QL SCN: NEGATIVE
CANNABINOIDS UR QL SCN: NEGATIVE
CLARITY UR REFRACT.AUTO: CLEAR
COLOR UR AUTO: YELLOW
CREAT UR-MCNC: 136 MG/DL (ref 23–375)
GLUCOSE UR QL STRIP: NEGATIVE
HCV AB SERPL QL IA: NEGATIVE
HGB UR QL STRIP: NEGATIVE
HIV 1+2 AB+HIV1 P24 AG SERPL QL IA: NEGATIVE
HYALINE CASTS UR QL AUTO: 10 /LPF
KETONES UR QL STRIP: NEGATIVE
LEUKOCYTE ESTERASE UR QL STRIP: NEGATIVE
METHADONE UR QL SCN>300 NG/ML: NEGATIVE
MICROSCOPIC COMMENT: ABNORMAL
NITRITE UR QL STRIP: NEGATIVE
OPIATES UR QL SCN: NEGATIVE
PCP UR QL SCN>25 NG/ML: NEGATIVE
PH UR STRIP: 5 [PH] (ref 5–8)
POCT GLUCOSE: 101 MG/DL (ref 70–110)
POCT GLUCOSE: 128 MG/DL (ref 70–110)
POCT GLUCOSE: 130 MG/DL (ref 70–110)
POCT GLUCOSE: 144 MG/DL (ref 70–110)
PROT UR QL STRIP: ABNORMAL
RBC #/AREA URNS AUTO: 2 /HPF (ref 0–4)
SP GR UR STRIP: 1.01 (ref 1–1.03)
SQUAMOUS #/AREA URNS AUTO: 1 /HPF
TOXICOLOGY INFORMATION: NORMAL
URN SPEC COLLECT METH UR: ABNORMAL
WBC #/AREA URNS AUTO: 4 /HPF (ref 0–5)

## 2021-08-25 PROCEDURE — 96375 TX/PRO/DX INJ NEW DRUG ADDON: CPT

## 2021-08-25 PROCEDURE — 99222 PR INITIAL HOSPITAL CARE,LEVL II: ICD-10-PCS | Mod: ,,, | Performed by: INTERNAL MEDICINE

## 2021-08-25 PROCEDURE — 63600175 PHARM REV CODE 636 W HCPCS: Performed by: EMERGENCY MEDICINE

## 2021-08-25 PROCEDURE — 99220 PR INITIAL OBSERVATION CARE,LEVL III: ICD-10-PCS | Mod: ,,, | Performed by: PHYSICIAN ASSISTANT

## 2021-08-25 PROCEDURE — 99220 PR INITIAL OBSERVATION CARE,LEVL III: CPT | Mod: ,,, | Performed by: PHYSICIAN ASSISTANT

## 2021-08-25 PROCEDURE — 63600175 PHARM REV CODE 636 W HCPCS: Performed by: STUDENT IN AN ORGANIZED HEALTH CARE EDUCATION/TRAINING PROGRAM

## 2021-08-25 PROCEDURE — 63600175 PHARM REV CODE 636 W HCPCS: Performed by: PHYSICIAN ASSISTANT

## 2021-08-25 PROCEDURE — 25000003 PHARM REV CODE 250: Performed by: INTERNAL MEDICINE

## 2021-08-25 PROCEDURE — 11000001 HC ACUTE MED/SURG PRIVATE ROOM

## 2021-08-25 PROCEDURE — 25000003 PHARM REV CODE 250: Performed by: STUDENT IN AN ORGANIZED HEALTH CARE EDUCATION/TRAINING PROGRAM

## 2021-08-25 PROCEDURE — S4991 NICOTINE PATCH NONLEGEND: HCPCS | Performed by: INTERNAL MEDICINE

## 2021-08-25 PROCEDURE — 99222 1ST HOSP IP/OBS MODERATE 55: CPT | Mod: ,,, | Performed by: INTERNAL MEDICINE

## 2021-08-25 PROCEDURE — 25000003 PHARM REV CODE 250: Performed by: PHYSICIAN ASSISTANT

## 2021-08-25 RX ORDER — SODIUM CHLORIDE 0.9 % (FLUSH) 0.9 %
10 SYRINGE (ML) INJECTION
Status: DISCONTINUED | OUTPATIENT
Start: 2021-08-25 | End: 2021-08-26 | Stop reason: HOSPADM

## 2021-08-25 RX ORDER — DIGOXIN 0.25 MG/ML
250 INJECTION INTRAMUSCULAR; INTRAVENOUS EVERY 6 HOURS
Status: DISCONTINUED | OUTPATIENT
Start: 2021-08-25 | End: 2021-08-25

## 2021-08-25 RX ORDER — GLUCAGON 1 MG
1 KIT INJECTION
Status: DISCONTINUED | OUTPATIENT
Start: 2021-08-25 | End: 2021-08-26 | Stop reason: HOSPADM

## 2021-08-25 RX ORDER — LANOLIN ALCOHOL/MO/W.PET/CERES
800 CREAM (GRAM) TOPICAL
Status: DISCONTINUED | OUTPATIENT
Start: 2021-08-25 | End: 2021-08-26

## 2021-08-25 RX ORDER — ACETAMINOPHEN 325 MG/1
650 TABLET ORAL EVERY 4 HOURS PRN
Status: DISCONTINUED | OUTPATIENT
Start: 2021-08-25 | End: 2021-08-26 | Stop reason: HOSPADM

## 2021-08-25 RX ORDER — FUROSEMIDE 10 MG/ML
40 INJECTION INTRAMUSCULAR; INTRAVENOUS 2 TIMES DAILY
Status: DISCONTINUED | OUTPATIENT
Start: 2021-08-25 | End: 2021-08-25

## 2021-08-25 RX ORDER — SODIUM,POTASSIUM PHOSPHATES 280-250MG
2 POWDER IN PACKET (EA) ORAL
Status: DISCONTINUED | OUTPATIENT
Start: 2021-08-25 | End: 2021-08-26

## 2021-08-25 RX ORDER — IBUPROFEN 200 MG
1 TABLET ORAL DAILY
Status: DISCONTINUED | OUTPATIENT
Start: 2021-08-26 | End: 2021-08-25

## 2021-08-25 RX ORDER — IPRATROPIUM BROMIDE AND ALBUTEROL SULFATE 2.5; .5 MG/3ML; MG/3ML
3 SOLUTION RESPIRATORY (INHALATION) EVERY 4 HOURS PRN
Status: DISCONTINUED | OUTPATIENT
Start: 2021-08-25 | End: 2021-08-26 | Stop reason: HOSPADM

## 2021-08-25 RX ORDER — FUROSEMIDE 10 MG/ML
80 INJECTION INTRAMUSCULAR; INTRAVENOUS 2 TIMES DAILY
Status: DISCONTINUED | OUTPATIENT
Start: 2021-08-25 | End: 2021-08-25

## 2021-08-25 RX ORDER — DIGOXIN 0.25 MG/ML
250 INJECTION INTRAMUSCULAR; INTRAVENOUS EVERY 6 HOURS
Status: COMPLETED | OUTPATIENT
Start: 2021-08-25 | End: 2021-08-25

## 2021-08-25 RX ORDER — BISACODYL 10 MG
10 SUPPOSITORY, RECTAL RECTAL DAILY PRN
Status: DISCONTINUED | OUTPATIENT
Start: 2021-08-25 | End: 2021-08-26 | Stop reason: HOSPADM

## 2021-08-25 RX ORDER — POLYETHYLENE GLYCOL 3350 17 G/17G
17 POWDER, FOR SOLUTION ORAL DAILY
Status: DISCONTINUED | OUTPATIENT
Start: 2021-08-25 | End: 2021-08-26 | Stop reason: HOSPADM

## 2021-08-25 RX ORDER — IBUPROFEN 200 MG
1 TABLET ORAL DAILY
Status: DISCONTINUED | OUTPATIENT
Start: 2021-08-25 | End: 2021-08-26 | Stop reason: HOSPADM

## 2021-08-25 RX ORDER — SODIUM CHLORIDE 0.9 % (FLUSH) 0.9 %
5 SYRINGE (ML) INJECTION
Status: DISCONTINUED | OUTPATIENT
Start: 2021-08-25 | End: 2021-08-26 | Stop reason: HOSPADM

## 2021-08-25 RX ORDER — FUROSEMIDE 40 MG/1
80 TABLET ORAL 2 TIMES DAILY
Status: DISCONTINUED | OUTPATIENT
Start: 2021-08-25 | End: 2021-08-26 | Stop reason: HOSPADM

## 2021-08-25 RX ORDER — IBUPROFEN 200 MG
16 TABLET ORAL
Status: DISCONTINUED | OUTPATIENT
Start: 2021-08-25 | End: 2021-08-26 | Stop reason: HOSPADM

## 2021-08-25 RX ORDER — KETOROLAC TROMETHAMINE 30 MG/ML
30 INJECTION, SOLUTION INTRAMUSCULAR; INTRAVENOUS EVERY 6 HOURS PRN
Status: DISCONTINUED | OUTPATIENT
Start: 2021-08-25 | End: 2021-08-26 | Stop reason: HOSPADM

## 2021-08-25 RX ORDER — ACETAMINOPHEN 500 MG
1000 TABLET ORAL EVERY 8 HOURS PRN
Status: DISCONTINUED | OUTPATIENT
Start: 2021-08-25 | End: 2021-08-26 | Stop reason: HOSPADM

## 2021-08-25 RX ORDER — NAPROXEN SODIUM 220 MG/1
81 TABLET, FILM COATED ORAL DAILY
Status: DISCONTINUED | OUTPATIENT
Start: 2021-08-25 | End: 2021-08-26 | Stop reason: HOSPADM

## 2021-08-25 RX ORDER — HYDROXYZINE PAMOATE 25 MG/1
25 CAPSULE ORAL EVERY 8 HOURS PRN
Status: DISCONTINUED | OUTPATIENT
Start: 2021-08-25 | End: 2021-08-26 | Stop reason: HOSPADM

## 2021-08-25 RX ORDER — DIGOXIN 0.25 MG/ML
500 INJECTION INTRAMUSCULAR; INTRAVENOUS
Status: COMPLETED | OUTPATIENT
Start: 2021-08-25 | End: 2021-08-25

## 2021-08-25 RX ORDER — ONDANSETRON 8 MG/1
8 TABLET, ORALLY DISINTEGRATING ORAL EVERY 8 HOURS PRN
Status: DISCONTINUED | OUTPATIENT
Start: 2021-08-25 | End: 2021-08-26 | Stop reason: HOSPADM

## 2021-08-25 RX ORDER — TALC
6 POWDER (GRAM) TOPICAL NIGHTLY PRN
Status: DISCONTINUED | OUTPATIENT
Start: 2021-08-25 | End: 2021-08-26 | Stop reason: HOSPADM

## 2021-08-25 RX ORDER — PROCHLORPERAZINE EDISYLATE 5 MG/ML
5 INJECTION INTRAMUSCULAR; INTRAVENOUS EVERY 6 HOURS PRN
Status: DISCONTINUED | OUTPATIENT
Start: 2021-08-25 | End: 2021-08-26 | Stop reason: HOSPADM

## 2021-08-25 RX ORDER — IBUPROFEN 200 MG
24 TABLET ORAL
Status: DISCONTINUED | OUTPATIENT
Start: 2021-08-25 | End: 2021-08-26 | Stop reason: HOSPADM

## 2021-08-25 RX ADMIN — APIXABAN 5 MG: 5 TABLET, FILM COATED ORAL at 08:08

## 2021-08-25 RX ADMIN — DIGOXIN 500 MCG: 0.25 INJECTION INTRAMUSCULAR; INTRAVENOUS at 12:08

## 2021-08-25 RX ADMIN — NICOTINE 1 PATCH: 14 PATCH TRANSDERMAL at 08:08

## 2021-08-25 RX ADMIN — DIGOXIN 250 MCG: 0.25 INJECTION INTRAMUSCULAR; INTRAVENOUS at 06:08

## 2021-08-25 RX ADMIN — ASPIRIN 81 MG: 81 TABLET, CHEWABLE ORAL at 08:08

## 2021-08-25 RX ADMIN — FUROSEMIDE 80 MG: 10 INJECTION, SOLUTION INTRAVENOUS at 08:08

## 2021-08-25 RX ADMIN — DIGOXIN 250 MCG: 0.25 INJECTION INTRAMUSCULAR; INTRAVENOUS at 01:08

## 2021-08-25 RX ADMIN — FUROSEMIDE 80 MG: 40 TABLET ORAL at 06:08

## 2021-08-26 ENCOUNTER — ANESTHESIA (OUTPATIENT)
Dept: MEDSURG UNIT | Facility: HOSPITAL | Age: 61
DRG: 308 | End: 2021-08-26
Payer: MEDICAID

## 2021-08-26 ENCOUNTER — ANESTHESIA EVENT (OUTPATIENT)
Dept: MEDSURG UNIT | Facility: HOSPITAL | Age: 61
DRG: 308 | End: 2021-08-26
Payer: MEDICAID

## 2021-08-26 VITALS
HEART RATE: 77 BPM | DIASTOLIC BLOOD PRESSURE: 73 MMHG | BODY MASS INDEX: 34.53 KG/M2 | SYSTOLIC BLOOD PRESSURE: 112 MMHG | OXYGEN SATURATION: 90 % | WEIGHT: 220 LBS | TEMPERATURE: 98 F | HEIGHT: 67 IN | RESPIRATION RATE: 18 BRPM

## 2021-08-26 LAB
ANION GAP SERPL CALC-SCNC: 9 MMOL/L (ref 8–16)
ASCENDING AORTA: 3.3 CM
BASOPHILS # BLD AUTO: 0.07 K/UL (ref 0–0.2)
BASOPHILS NFR BLD: 0.8 % (ref 0–1.9)
BSA FOR ECHO PROCEDURE: 2.17 M2
BUN SERPL-MCNC: 15 MG/DL (ref 6–20)
CALCIUM SERPL-MCNC: 9 MG/DL (ref 8.7–10.5)
CHLORIDE SERPL-SCNC: 99 MMOL/L (ref 95–110)
CO2 SERPL-SCNC: 34 MMOL/L (ref 23–29)
CREAT SERPL-MCNC: 0.8 MG/DL (ref 0.5–1.4)
DIFFERENTIAL METHOD: ABNORMAL
EJECTION FRACTION: 15 %
EOSINOPHIL # BLD AUTO: 0.4 K/UL (ref 0–0.5)
EOSINOPHIL NFR BLD: 4.5 % (ref 0–8)
ERYTHROCYTE [DISTWIDTH] IN BLOOD BY AUTOMATED COUNT: 14.7 % (ref 11.5–14.5)
EST. GFR  (AFRICAN AMERICAN): >60 ML/MIN/1.73 M^2
EST. GFR  (NON AFRICAN AMERICAN): >60 ML/MIN/1.73 M^2
GLUCOSE SERPL-MCNC: 88 MG/DL (ref 70–110)
HCT VFR BLD AUTO: 47.7 % (ref 40–54)
HGB BLD-MCNC: 15.9 G/DL (ref 14–18)
IMM GRANULOCYTES # BLD AUTO: 0.03 K/UL (ref 0–0.04)
IMM GRANULOCYTES NFR BLD AUTO: 0.3 % (ref 0–0.5)
LYMPHOCYTES # BLD AUTO: 2.4 K/UL (ref 1–4.8)
LYMPHOCYTES NFR BLD: 28 % (ref 18–48)
MAGNESIUM SERPL-MCNC: 1.3 MG/DL (ref 1.6–2.6)
MCH RBC QN AUTO: 30.1 PG (ref 27–31)
MCHC RBC AUTO-ENTMCNC: 33.3 G/DL (ref 32–36)
MCV RBC AUTO: 90 FL (ref 82–98)
MONOCYTES # BLD AUTO: 0.9 K/UL (ref 0.3–1)
MONOCYTES NFR BLD: 10.2 % (ref 4–15)
NEUTROPHILS # BLD AUTO: 4.9 K/UL (ref 1.8–7.7)
NEUTROPHILS NFR BLD: 56.2 % (ref 38–73)
NRBC BLD-RTO: 0 /100 WBC
PHOSPHATE SERPL-MCNC: 4.1 MG/DL (ref 2.7–4.5)
PLATELET # BLD AUTO: 246 K/UL (ref 150–450)
PMV BLD AUTO: 11.5 FL (ref 9.2–12.9)
POTASSIUM SERPL-SCNC: 3 MMOL/L (ref 3.5–5.1)
RBC # BLD AUTO: 5.29 M/UL (ref 4.6–6.2)
SINUS: 3.2 CM
SODIUM SERPL-SCNC: 142 MMOL/L (ref 136–145)
STJ: 2.9 CM
WBC # BLD AUTO: 8.7 K/UL (ref 3.9–12.7)

## 2021-08-26 PROCEDURE — 36415 COLL VENOUS BLD VENIPUNCTURE: CPT | Performed by: PHYSICIAN ASSISTANT

## 2021-08-26 PROCEDURE — 84100 ASSAY OF PHOSPHORUS: CPT | Performed by: PHYSICIAN ASSISTANT

## 2021-08-26 PROCEDURE — 25000003 PHARM REV CODE 250: Performed by: NURSE ANESTHETIST, CERTIFIED REGISTERED

## 2021-08-26 PROCEDURE — 83735 ASSAY OF MAGNESIUM: CPT | Performed by: PHYSICIAN ASSISTANT

## 2021-08-26 PROCEDURE — 92960 PR CARDIOVERSION, ELECTIVE;EXTERN: ICD-10-PCS | Mod: ,,, | Performed by: INTERNAL MEDICINE

## 2021-08-26 PROCEDURE — 63600175 PHARM REV CODE 636 W HCPCS: Performed by: NURSE ANESTHETIST, CERTIFIED REGISTERED

## 2021-08-26 PROCEDURE — 92960 CARDIOVERSION ELECTRIC EXT: CPT | Mod: ,,, | Performed by: INTERNAL MEDICINE

## 2021-08-26 PROCEDURE — 63600175 PHARM REV CODE 636 W HCPCS: Performed by: STUDENT IN AN ORGANIZED HEALTH CARE EDUCATION/TRAINING PROGRAM

## 2021-08-26 PROCEDURE — D9220A PRA ANESTHESIA: Mod: CRNA,,, | Performed by: NURSE ANESTHETIST, CERTIFIED REGISTERED

## 2021-08-26 PROCEDURE — 93010 ELECTROCARDIOGRAM REPORT: CPT | Mod: ,,, | Performed by: INTERNAL MEDICINE

## 2021-08-26 PROCEDURE — S4991 NICOTINE PATCH NONLEGEND: HCPCS | Performed by: INTERNAL MEDICINE

## 2021-08-26 PROCEDURE — D9220A PRA ANESTHESIA: ICD-10-PCS | Mod: CRNA,,, | Performed by: NURSE ANESTHETIST, CERTIFIED REGISTERED

## 2021-08-26 PROCEDURE — 99238 HOSP IP/OBS DSCHRG MGMT 30/<: CPT | Mod: ,,, | Performed by: INTERNAL MEDICINE

## 2021-08-26 PROCEDURE — 93010 EKG 12-LEAD: ICD-10-PCS | Mod: ,,, | Performed by: INTERNAL MEDICINE

## 2021-08-26 PROCEDURE — 25000003 PHARM REV CODE 250: Performed by: STUDENT IN AN ORGANIZED HEALTH CARE EDUCATION/TRAINING PROGRAM

## 2021-08-26 PROCEDURE — 99238 PR HOSPITAL DISCHARGE DAY,<30 MIN: ICD-10-PCS | Mod: ,,, | Performed by: INTERNAL MEDICINE

## 2021-08-26 PROCEDURE — D9220A PRA ANESTHESIA: Mod: ANES,,, | Performed by: ANESTHESIOLOGY

## 2021-08-26 PROCEDURE — 80048 BASIC METABOLIC PNL TOTAL CA: CPT | Performed by: PHYSICIAN ASSISTANT

## 2021-08-26 PROCEDURE — 93005 ELECTROCARDIOGRAM TRACING: CPT

## 2021-08-26 PROCEDURE — 25000003 PHARM REV CODE 250: Performed by: PHYSICIAN ASSISTANT

## 2021-08-26 PROCEDURE — 37000009 HC ANESTHESIA EA ADD 15 MINS: Performed by: INTERNAL MEDICINE

## 2021-08-26 PROCEDURE — 92960 CARDIOVERSION ELECTRIC EXT: CPT | Performed by: INTERNAL MEDICINE

## 2021-08-26 PROCEDURE — D9220A PRA ANESTHESIA: ICD-10-PCS | Mod: ANES,,, | Performed by: ANESTHESIOLOGY

## 2021-08-26 PROCEDURE — 25000003 PHARM REV CODE 250: Performed by: INTERNAL MEDICINE

## 2021-08-26 PROCEDURE — 37000008 HC ANESTHESIA 1ST 15 MINUTES: Performed by: INTERNAL MEDICINE

## 2021-08-26 PROCEDURE — 85025 COMPLETE CBC W/AUTO DIFF WBC: CPT | Performed by: PHYSICIAN ASSISTANT

## 2021-08-26 RX ORDER — SACUBITRIL AND VALSARTAN 24; 26 MG/1; MG/1
1 TABLET, FILM COATED ORAL 2 TIMES DAILY
Qty: 60 TABLET | Refills: 11 | Status: ON HOLD | OUTPATIENT
Start: 2021-08-26 | End: 2022-05-19 | Stop reason: SDUPTHER

## 2021-08-26 RX ORDER — LIDOCAINE HYDROCHLORIDE 20 MG/ML
SOLUTION OROPHARYNGEAL
Status: DISCONTINUED | OUTPATIENT
Start: 2021-08-26 | End: 2021-08-26

## 2021-08-26 RX ORDER — POTASSIUM CHLORIDE 20 MEQ/1
40 TABLET, EXTENDED RELEASE ORAL
Status: COMPLETED | OUTPATIENT
Start: 2021-08-26 | End: 2021-08-26

## 2021-08-26 RX ORDER — FUROSEMIDE 40 MG/1
40 TABLET ORAL 2 TIMES DAILY
Qty: 60 TABLET | Refills: 11 | Status: SHIPPED | OUTPATIENT
Start: 2021-08-26 | End: 2021-08-26

## 2021-08-26 RX ORDER — CARVEDILOL 3.12 MG/1
3.12 TABLET ORAL 2 TIMES DAILY
Qty: 60 TABLET | Refills: 11 | Status: ON HOLD | OUTPATIENT
Start: 2021-08-26 | End: 2021-09-08 | Stop reason: SDUPTHER

## 2021-08-26 RX ORDER — DEXMEDETOMIDINE HYDROCHLORIDE 100 UG/ML
INJECTION, SOLUTION INTRAVENOUS
Status: DISCONTINUED | OUTPATIENT
Start: 2021-08-26 | End: 2021-08-26

## 2021-08-26 RX ORDER — CARVEDILOL 3.12 MG/1
3.12 TABLET ORAL 2 TIMES DAILY
Status: DISCONTINUED | OUTPATIENT
Start: 2021-08-26 | End: 2021-08-26 | Stop reason: HOSPADM

## 2021-08-26 RX ORDER — LIDOCAINE HCL/PF 100 MG/5ML
SYRINGE (ML) INTRAVENOUS
Status: DISCONTINUED | OUTPATIENT
Start: 2021-08-26 | End: 2021-08-26

## 2021-08-26 RX ORDER — ONDANSETRON 2 MG/ML
INJECTION INTRAMUSCULAR; INTRAVENOUS
Status: DISCONTINUED | OUTPATIENT
Start: 2021-08-26 | End: 2021-08-26

## 2021-08-26 RX ORDER — FUROSEMIDE 40 MG/1
40 TABLET ORAL DAILY
Qty: 30 TABLET | Refills: 11 | OUTPATIENT
Start: 2021-08-26 | End: 2022-08-26

## 2021-08-26 RX ORDER — FUROSEMIDE 40 MG/1
40 TABLET ORAL DAILY
Qty: 90 TABLET | Refills: 3 | Status: ON HOLD | OUTPATIENT
Start: 2021-08-26 | End: 2021-09-08 | Stop reason: SDUPTHER

## 2021-08-26 RX ORDER — ETOMIDATE 2 MG/ML
INJECTION INTRAVENOUS
Status: DISCONTINUED | OUTPATIENT
Start: 2021-08-26 | End: 2021-08-26

## 2021-08-26 RX ORDER — PHENYLEPHRINE HCL IN 0.9% NACL 1 MG/10 ML
SYRINGE (ML) INTRAVENOUS
Status: DISCONTINUED | OUTPATIENT
Start: 2021-08-26 | End: 2021-08-26

## 2021-08-26 RX ORDER — MAGNESIUM SULFATE HEPTAHYDRATE 40 MG/ML
2 INJECTION, SOLUTION INTRAVENOUS
Status: COMPLETED | OUTPATIENT
Start: 2021-08-26 | End: 2021-08-26

## 2021-08-26 RX ORDER — FUROSEMIDE 40 MG/1
40 TABLET ORAL DAILY
Qty: 30 TABLET | Refills: 11 | Status: ON HOLD | OUTPATIENT
Start: 2021-08-26 | End: 2021-09-08 | Stop reason: HOSPADM

## 2021-08-26 RX ORDER — IBUPROFEN 200 MG
1 TABLET ORAL DAILY
Qty: 30 PATCH | Refills: 0 | Status: ON HOLD | OUTPATIENT
Start: 2021-08-27 | End: 2021-09-22 | Stop reason: SDUPTHER

## 2021-08-26 RX ADMIN — MAGNESIUM SULFATE 2 G: 2 INJECTION INTRAVENOUS at 01:08

## 2021-08-26 RX ADMIN — POTASSIUM CHLORIDE 40 MEQ: 1500 TABLET, EXTENDED RELEASE ORAL at 01:08

## 2021-08-26 RX ADMIN — LIDOCAINE HYDROCHLORIDE 15 ML: 20 SOLUTION ORAL; TOPICAL at 09:08

## 2021-08-26 RX ADMIN — NICOTINE 1 PATCH: 14 PATCH TRANSDERMAL at 11:08

## 2021-08-26 RX ADMIN — APIXABAN 5 MG: 5 TABLET, FILM COATED ORAL at 11:08

## 2021-08-26 RX ADMIN — ONDANSETRON 4 MG: 2 INJECTION INTRAMUSCULAR; INTRAVENOUS at 09:08

## 2021-08-26 RX ADMIN — FUROSEMIDE 80 MG: 40 TABLET ORAL at 11:08

## 2021-08-26 RX ADMIN — MAGNESIUM SULFATE 2 G: 2 INJECTION INTRAVENOUS at 11:08

## 2021-08-26 RX ADMIN — POTASSIUM CHLORIDE 40 MEQ: 1500 TABLET, EXTENDED RELEASE ORAL at 11:08

## 2021-08-26 RX ADMIN — DEXMEDETOMIDINE HYDROCHLORIDE 0.25 MCG/KG/HR: 100 INJECTION, SOLUTION INTRAVENOUS at 09:08

## 2021-08-26 RX ADMIN — DEXMEDETOMIDINE HYDROCHLORIDE 50 MCG: 100 INJECTION, SOLUTION INTRAVENOUS at 09:08

## 2021-08-26 RX ADMIN — ASPIRIN 81 MG: 81 TABLET, CHEWABLE ORAL at 11:08

## 2021-08-26 RX ADMIN — VALSARTAN 20 MG: 40 TABLET, FILM COATED ORAL at 11:08

## 2021-08-26 RX ADMIN — Medication 100 MCG: at 09:08

## 2021-08-26 RX ADMIN — ETOMIDATE 14 MG: 2 INJECTION, SOLUTION INTRAVENOUS at 09:08

## 2021-08-26 RX ADMIN — LIDOCAINE HYDROCHLORIDE 100 MG: 20 INJECTION, SOLUTION INTRAVENOUS at 09:08

## 2021-08-26 RX ADMIN — CARVEDILOL 3.12 MG: 3.12 TABLET, FILM COATED ORAL at 11:08

## 2021-08-27 LAB — POCT GLUCOSE: 112 MG/DL (ref 70–110)

## 2021-09-06 ENCOUNTER — HOSPITAL ENCOUNTER (INPATIENT)
Facility: HOSPITAL | Age: 61
LOS: 2 days | Discharge: HOME | DRG: 308 | End: 2021-09-08
Attending: EMERGENCY MEDICINE | Admitting: INTERNAL MEDICINE
Payer: MEDICAID

## 2021-09-06 DIAGNOSIS — R07.9 CHEST PAIN: ICD-10-CM

## 2021-09-06 DIAGNOSIS — R00.2 PALPITATIONS: ICD-10-CM

## 2021-09-06 DIAGNOSIS — I48.91 A-FIB: ICD-10-CM

## 2021-09-06 DIAGNOSIS — I48.91 ATRIAL FIBRILLATION WITH RVR: Primary | ICD-10-CM

## 2021-09-06 LAB
ALBUMIN SERPL BCP-MCNC: 3.4 G/DL (ref 3.5–5.2)
ALP SERPL-CCNC: 76 U/L (ref 55–135)
ALT SERPL W/O P-5'-P-CCNC: 32 U/L (ref 10–44)
ANION GAP SERPL CALC-SCNC: 10 MMOL/L (ref 8–16)
AST SERPL-CCNC: 30 U/L (ref 10–40)
BASOPHILS # BLD AUTO: 0.08 K/UL (ref 0–0.2)
BASOPHILS NFR BLD: 0.9 % (ref 0–1.9)
BILIRUB SERPL-MCNC: 1.2 MG/DL (ref 0.1–1)
BNP SERPL-MCNC: 2079 PG/ML (ref 0–99)
BUN SERPL-MCNC: 20 MG/DL (ref 8–23)
CALCIUM SERPL-MCNC: 9.5 MG/DL (ref 8.7–10.5)
CHLORIDE SERPL-SCNC: 111 MMOL/L (ref 95–110)
CO2 SERPL-SCNC: 21 MMOL/L (ref 23–29)
CREAT SERPL-MCNC: 0.8 MG/DL (ref 0.5–1.4)
DIFFERENTIAL METHOD: ABNORMAL
EOSINOPHIL # BLD AUTO: 0.5 K/UL (ref 0–0.5)
EOSINOPHIL NFR BLD: 5.6 % (ref 0–8)
ERYTHROCYTE [DISTWIDTH] IN BLOOD BY AUTOMATED COUNT: 15 % (ref 11.5–14.5)
EST. GFR  (AFRICAN AMERICAN): >60 ML/MIN/1.73 M^2
EST. GFR  (NON AFRICAN AMERICAN): >60 ML/MIN/1.73 M^2
GLUCOSE SERPL-MCNC: 99 MG/DL (ref 70–110)
HCT VFR BLD AUTO: 51.5 % (ref 40–54)
HGB BLD-MCNC: 17.6 G/DL (ref 14–18)
IMM GRANULOCYTES # BLD AUTO: 0.02 K/UL (ref 0–0.04)
IMM GRANULOCYTES NFR BLD AUTO: 0.2 % (ref 0–0.5)
LYMPHOCYTES # BLD AUTO: 3.5 K/UL (ref 1–4.8)
LYMPHOCYTES NFR BLD: 39.3 % (ref 18–48)
MAGNESIUM SERPL-MCNC: 2 MG/DL (ref 1.6–2.6)
MCH RBC QN AUTO: 30.7 PG (ref 27–31)
MCHC RBC AUTO-ENTMCNC: 34.2 G/DL (ref 32–36)
MCV RBC AUTO: 90 FL (ref 82–98)
MONOCYTES # BLD AUTO: 1 K/UL (ref 0.3–1)
MONOCYTES NFR BLD: 11.4 % (ref 4–15)
NEUTROPHILS # BLD AUTO: 3.8 K/UL (ref 1.8–7.7)
NEUTROPHILS NFR BLD: 42.6 % (ref 38–73)
NRBC BLD-RTO: 0 /100 WBC
PLATELET # BLD AUTO: 202 K/UL (ref 150–450)
PMV BLD AUTO: 11.1 FL (ref 9.2–12.9)
POTASSIUM SERPL-SCNC: 4.1 MMOL/L (ref 3.5–5.1)
PROT SERPL-MCNC: 6.1 G/DL (ref 6–8.4)
RBC # BLD AUTO: 5.74 M/UL (ref 4.6–6.2)
SODIUM SERPL-SCNC: 142 MMOL/L (ref 136–145)
TROPONIN I SERPL DL<=0.01 NG/ML-MCNC: 0.03 NG/ML (ref 0–0.03)
WBC # BLD AUTO: 8.95 K/UL (ref 3.9–12.7)

## 2021-09-06 PROCEDURE — G0378 HOSPITAL OBSERVATION PER HR: HCPCS

## 2021-09-06 PROCEDURE — 93005 ELECTROCARDIOGRAM TRACING: CPT

## 2021-09-06 PROCEDURE — 99285 EMERGENCY DEPT VISIT HI MDM: CPT | Mod: 25

## 2021-09-06 PROCEDURE — 99285 EMERGENCY DEPT VISIT HI MDM: CPT | Mod: CR,CS,, | Performed by: EMERGENCY MEDICINE

## 2021-09-06 PROCEDURE — 93010 EKG 12-LEAD: ICD-10-PCS | Mod: ,,, | Performed by: INTERNAL MEDICINE

## 2021-09-06 PROCEDURE — 84484 ASSAY OF TROPONIN QUANT: CPT | Performed by: EMERGENCY MEDICINE

## 2021-09-06 PROCEDURE — 96375 TX/PRO/DX INJ NEW DRUG ADDON: CPT

## 2021-09-06 PROCEDURE — 12000002 HC ACUTE/MED SURGE SEMI-PRIVATE ROOM

## 2021-09-06 PROCEDURE — 63600175 PHARM REV CODE 636 W HCPCS: Performed by: EMERGENCY MEDICINE

## 2021-09-06 PROCEDURE — 83735 ASSAY OF MAGNESIUM: CPT | Performed by: EMERGENCY MEDICINE

## 2021-09-06 PROCEDURE — 93010 ELECTROCARDIOGRAM REPORT: CPT | Mod: ,,, | Performed by: INTERNAL MEDICINE

## 2021-09-06 PROCEDURE — 83880 ASSAY OF NATRIURETIC PEPTIDE: CPT | Performed by: EMERGENCY MEDICINE

## 2021-09-06 PROCEDURE — 85025 COMPLETE CBC W/AUTO DIFF WBC: CPT | Performed by: EMERGENCY MEDICINE

## 2021-09-06 PROCEDURE — 80053 COMPREHEN METABOLIC PANEL: CPT | Performed by: EMERGENCY MEDICINE

## 2021-09-06 PROCEDURE — 99285 PR EMERGENCY DEPT VISIT,LEVEL V: ICD-10-PCS | Mod: CR,CS,, | Performed by: EMERGENCY MEDICINE

## 2021-09-06 RX ORDER — FUROSEMIDE 10 MG/ML
80 INJECTION INTRAMUSCULAR; INTRAVENOUS
Status: COMPLETED | OUTPATIENT
Start: 2021-09-06 | End: 2021-09-06

## 2021-09-06 RX ORDER — DIGOXIN 0.25 MG/ML
500 INJECTION INTRAMUSCULAR; INTRAVENOUS
Status: DISCONTINUED | OUTPATIENT
Start: 2021-09-06 | End: 2021-09-07

## 2021-09-06 RX ADMIN — FUROSEMIDE 80 MG: 10 INJECTION, SOLUTION INTRAMUSCULAR; INTRAVENOUS at 11:09

## 2021-09-07 PROBLEM — D72.829 LEUKOCYTOSIS: Status: RESOLVED | Noted: 2021-08-25 | Resolved: 2021-09-07

## 2021-09-07 PROBLEM — R79.89 ELEVATED TROPONIN: Status: RESOLVED | Noted: 2021-08-09 | Resolved: 2021-09-07

## 2021-09-07 PROBLEM — R57.9 SHOCK: Status: RESOLVED | Noted: 2021-08-09 | Resolved: 2021-09-07

## 2021-09-07 PROBLEM — R00.2 PALPITATIONS: Status: RESOLVED | Noted: 2018-12-16 | Resolved: 2021-09-07

## 2021-09-07 PROBLEM — E66.9 OBESITY (BMI 35.0-39.9 WITHOUT COMORBIDITY): Chronic | Status: RESOLVED | Noted: 2017-04-08 | Resolved: 2021-09-07

## 2021-09-07 PROBLEM — K08.89 TOOTHACHE: Status: RESOLVED | Noted: 2018-12-16 | Resolved: 2021-09-07

## 2021-09-07 LAB
ANION GAP SERPL CALC-SCNC: 9 MMOL/L (ref 8–16)
BASOPHILS # BLD AUTO: 0.08 K/UL (ref 0–0.2)
BASOPHILS NFR BLD: 1 % (ref 0–1.9)
BUN SERPL-MCNC: 17 MG/DL (ref 8–23)
CALCIUM SERPL-MCNC: 9.4 MG/DL (ref 8.7–10.5)
CHLORIDE SERPL-SCNC: 102 MMOL/L (ref 95–110)
CO2 SERPL-SCNC: 30 MMOL/L (ref 23–29)
CREAT SERPL-MCNC: 0.9 MG/DL (ref 0.5–1.4)
CTP QC/QA: YES
DIFFERENTIAL METHOD: ABNORMAL
EOSINOPHIL # BLD AUTO: 0.5 K/UL (ref 0–0.5)
EOSINOPHIL NFR BLD: 6 % (ref 0–8)
ERYTHROCYTE [DISTWIDTH] IN BLOOD BY AUTOMATED COUNT: 14.8 % (ref 11.5–14.5)
EST. GFR  (AFRICAN AMERICAN): >60 ML/MIN/1.73 M^2
EST. GFR  (NON AFRICAN AMERICAN): >60 ML/MIN/1.73 M^2
ESTIMATED AVG GLUCOSE: 120 MG/DL (ref 68–131)
GLUCOSE SERPL-MCNC: 98 MG/DL (ref 70–110)
HBA1C MFR BLD: 5.8 % (ref 4–5.6)
HCT VFR BLD AUTO: 51.8 % (ref 40–54)
HGB BLD-MCNC: 17.2 G/DL (ref 14–18)
IMM GRANULOCYTES # BLD AUTO: 0.02 K/UL (ref 0–0.04)
IMM GRANULOCYTES NFR BLD AUTO: 0.3 % (ref 0–0.5)
LYMPHOCYTES # BLD AUTO: 2.8 K/UL (ref 1–4.8)
LYMPHOCYTES NFR BLD: 36.1 % (ref 18–48)
MAGNESIUM SERPL-MCNC: 1.7 MG/DL (ref 1.6–2.6)
MCH RBC QN AUTO: 29.9 PG (ref 27–31)
MCHC RBC AUTO-ENTMCNC: 33.2 G/DL (ref 32–36)
MCV RBC AUTO: 90 FL (ref 82–98)
MONOCYTES # BLD AUTO: 0.8 K/UL (ref 0.3–1)
MONOCYTES NFR BLD: 9.7 % (ref 4–15)
NEUTROPHILS # BLD AUTO: 3.7 K/UL (ref 1.8–7.7)
NEUTROPHILS NFR BLD: 46.9 % (ref 38–73)
NRBC BLD-RTO: 0 /100 WBC
PHOSPHATE SERPL-MCNC: 4.2 MG/DL (ref 2.7–4.5)
PLATELET # BLD AUTO: 188 K/UL (ref 150–450)
PMV BLD AUTO: 10.8 FL (ref 9.2–12.9)
POTASSIUM SERPL-SCNC: 3.6 MMOL/L (ref 3.5–5.1)
RBC # BLD AUTO: 5.76 M/UL (ref 4.6–6.2)
SARS-COV-2 RDRP RESP QL NAA+PROBE: NEGATIVE
SODIUM SERPL-SCNC: 141 MMOL/L (ref 136–145)
TSH SERPL DL<=0.005 MIU/L-ACNC: 1.54 UIU/ML (ref 0.4–4)
WBC # BLD AUTO: 7.83 K/UL (ref 3.9–12.7)

## 2021-09-07 PROCEDURE — 99213 OFFICE O/P EST LOW 20 MIN: CPT | Mod: ,,, | Performed by: INTERNAL MEDICINE

## 2021-09-07 PROCEDURE — U0002 COVID-19 LAB TEST NON-CDC: HCPCS | Performed by: INTERNAL MEDICINE

## 2021-09-07 PROCEDURE — G0378 HOSPITAL OBSERVATION PER HR: HCPCS

## 2021-09-07 PROCEDURE — 83735 ASSAY OF MAGNESIUM: CPT | Performed by: PHYSICIAN ASSISTANT

## 2021-09-07 PROCEDURE — 96366 THER/PROPH/DIAG IV INF ADDON: CPT

## 2021-09-07 PROCEDURE — 93010 EKG 12-LEAD: ICD-10-PCS | Mod: ,,, | Performed by: INTERNAL MEDICINE

## 2021-09-07 PROCEDURE — S4991 NICOTINE PATCH NONLEGEND: HCPCS | Performed by: STUDENT IN AN ORGANIZED HEALTH CARE EDUCATION/TRAINING PROGRAM

## 2021-09-07 PROCEDURE — 36415 COLL VENOUS BLD VENIPUNCTURE: CPT | Performed by: PHYSICIAN ASSISTANT

## 2021-09-07 PROCEDURE — 83036 HEMOGLOBIN GLYCOSYLATED A1C: CPT | Performed by: PHYSICIAN ASSISTANT

## 2021-09-07 PROCEDURE — 85025 COMPLETE CBC W/AUTO DIFF WBC: CPT | Performed by: PHYSICIAN ASSISTANT

## 2021-09-07 PROCEDURE — 96365 THER/PROPH/DIAG IV INF INIT: CPT

## 2021-09-07 PROCEDURE — 93010 ELECTROCARDIOGRAM REPORT: CPT | Mod: ,,, | Performed by: INTERNAL MEDICINE

## 2021-09-07 PROCEDURE — 36415 COLL VENOUS BLD VENIPUNCTURE: CPT | Performed by: STUDENT IN AN ORGANIZED HEALTH CARE EDUCATION/TRAINING PROGRAM

## 2021-09-07 PROCEDURE — 63600175 PHARM REV CODE 636 W HCPCS: Performed by: PHYSICIAN ASSISTANT

## 2021-09-07 PROCEDURE — 25000003 PHARM REV CODE 250: Performed by: STUDENT IN AN ORGANIZED HEALTH CARE EDUCATION/TRAINING PROGRAM

## 2021-09-07 PROCEDURE — 99220 PR INITIAL OBSERVATION CARE,LEVL III: CPT | Mod: ,,, | Performed by: PHYSICIAN ASSISTANT

## 2021-09-07 PROCEDURE — 99220 PR INITIAL OBSERVATION CARE,LEVL III: ICD-10-PCS | Mod: ,,, | Performed by: PHYSICIAN ASSISTANT

## 2021-09-07 PROCEDURE — 93005 ELECTROCARDIOGRAM TRACING: CPT

## 2021-09-07 PROCEDURE — 96376 TX/PRO/DX INJ SAME DRUG ADON: CPT

## 2021-09-07 PROCEDURE — 20600001 HC STEP DOWN PRIVATE ROOM

## 2021-09-07 PROCEDURE — 63600175 PHARM REV CODE 636 W HCPCS: Performed by: STUDENT IN AN ORGANIZED HEALTH CARE EDUCATION/TRAINING PROGRAM

## 2021-09-07 PROCEDURE — 80048 BASIC METABOLIC PNL TOTAL CA: CPT | Performed by: PHYSICIAN ASSISTANT

## 2021-09-07 PROCEDURE — 99213 PR OFFICE/OUTPT VISIT, EST, LEVL III, 20-29 MIN: ICD-10-PCS | Mod: ,,, | Performed by: INTERNAL MEDICINE

## 2021-09-07 PROCEDURE — 84100 ASSAY OF PHOSPHORUS: CPT | Performed by: PHYSICIAN ASSISTANT

## 2021-09-07 PROCEDURE — 25000003 PHARM REV CODE 250: Performed by: PHYSICIAN ASSISTANT

## 2021-09-07 PROCEDURE — 84443 ASSAY THYROID STIM HORMONE: CPT | Performed by: STUDENT IN AN ORGANIZED HEALTH CARE EDUCATION/TRAINING PROGRAM

## 2021-09-07 RX ORDER — LANOLIN ALCOHOL/MO/W.PET/CERES
800 CREAM (GRAM) TOPICAL
Status: DISCONTINUED | OUTPATIENT
Start: 2021-09-07 | End: 2021-09-07

## 2021-09-07 RX ORDER — IPRATROPIUM BROMIDE AND ALBUTEROL SULFATE 2.5; .5 MG/3ML; MG/3ML
3 SOLUTION RESPIRATORY (INHALATION) EVERY 4 HOURS PRN
Status: DISCONTINUED | OUTPATIENT
Start: 2021-09-07 | End: 2021-09-08

## 2021-09-07 RX ORDER — IBUPROFEN 200 MG
16 TABLET ORAL
Status: DISCONTINUED | OUTPATIENT
Start: 2021-09-07 | End: 2021-09-08 | Stop reason: HOSPADM

## 2021-09-07 RX ORDER — SODIUM,POTASSIUM PHOSPHATES 280-250MG
2 POWDER IN PACKET (EA) ORAL
Status: DISCONTINUED | OUTPATIENT
Start: 2021-09-07 | End: 2021-09-07

## 2021-09-07 RX ORDER — ACETAMINOPHEN 325 MG/1
650 TABLET ORAL EVERY 4 HOURS PRN
Status: DISCONTINUED | OUTPATIENT
Start: 2021-09-07 | End: 2021-09-08 | Stop reason: HOSPADM

## 2021-09-07 RX ORDER — DIGOXIN 0.25 MG/ML
250 INJECTION INTRAMUSCULAR; INTRAVENOUS ONCE
Status: DISCONTINUED | OUTPATIENT
Start: 2021-09-07 | End: 2021-09-07

## 2021-09-07 RX ORDER — ACETAMINOPHEN 500 MG
1000 TABLET ORAL EVERY 8 HOURS PRN
Status: DISCONTINUED | OUTPATIENT
Start: 2021-09-07 | End: 2021-09-08 | Stop reason: HOSPADM

## 2021-09-07 RX ORDER — GLUCAGON 1 MG
1 KIT INJECTION
Status: DISCONTINUED | OUTPATIENT
Start: 2021-09-07 | End: 2021-09-08 | Stop reason: HOSPADM

## 2021-09-07 RX ORDER — PROCHLORPERAZINE EDISYLATE 5 MG/ML
5 INJECTION INTRAMUSCULAR; INTRAVENOUS EVERY 6 HOURS PRN
Status: DISCONTINUED | OUTPATIENT
Start: 2021-09-07 | End: 2021-09-08 | Stop reason: HOSPADM

## 2021-09-07 RX ORDER — IBUPROFEN 200 MG
24 TABLET ORAL
Status: DISCONTINUED | OUTPATIENT
Start: 2021-09-07 | End: 2021-09-08 | Stop reason: HOSPADM

## 2021-09-07 RX ORDER — AMIODARONE HYDROCHLORIDE 200 MG/1
400 TABLET ORAL 2 TIMES DAILY
Status: DISCONTINUED | OUTPATIENT
Start: 2021-09-07 | End: 2021-09-07

## 2021-09-07 RX ORDER — SODIUM CHLORIDE 0.9 % (FLUSH) 0.9 %
5 SYRINGE (ML) INJECTION
Status: DISCONTINUED | OUTPATIENT
Start: 2021-09-07 | End: 2021-09-08 | Stop reason: HOSPADM

## 2021-09-07 RX ORDER — ONDANSETRON 8 MG/1
8 TABLET, ORALLY DISINTEGRATING ORAL EVERY 8 HOURS PRN
Status: DISCONTINUED | OUTPATIENT
Start: 2021-09-07 | End: 2021-09-08 | Stop reason: HOSPADM

## 2021-09-07 RX ORDER — CARVEDILOL 6.25 MG/1
6.25 TABLET ORAL 2 TIMES DAILY
Status: DISCONTINUED | OUTPATIENT
Start: 2021-09-07 | End: 2021-09-08 | Stop reason: HOSPADM

## 2021-09-07 RX ORDER — CARVEDILOL 3.12 MG/1
3.12 TABLET ORAL ONCE
Status: COMPLETED | OUTPATIENT
Start: 2021-09-07 | End: 2021-09-07

## 2021-09-07 RX ORDER — FUROSEMIDE 10 MG/ML
80 INJECTION INTRAMUSCULAR; INTRAVENOUS 2 TIMES DAILY
Status: DISCONTINUED | OUTPATIENT
Start: 2021-09-07 | End: 2021-09-07

## 2021-09-07 RX ORDER — POLYETHYLENE GLYCOL 3350 17 G/17G
17 POWDER, FOR SOLUTION ORAL DAILY
Status: DISCONTINUED | OUTPATIENT
Start: 2021-09-07 | End: 2021-09-08 | Stop reason: HOSPADM

## 2021-09-07 RX ORDER — IBUPROFEN 200 MG
1 TABLET ORAL DAILY
Status: DISCONTINUED | OUTPATIENT
Start: 2021-09-08 | End: 2021-09-07

## 2021-09-07 RX ORDER — CARVEDILOL 3.12 MG/1
3.12 TABLET ORAL 2 TIMES DAILY
Status: DISCONTINUED | OUTPATIENT
Start: 2021-09-07 | End: 2021-09-07

## 2021-09-07 RX ORDER — NAPROXEN SODIUM 220 MG/1
81 TABLET, FILM COATED ORAL DAILY
Status: DISCONTINUED | OUTPATIENT
Start: 2021-09-07 | End: 2021-09-07

## 2021-09-07 RX ORDER — DIGOXIN 0.25 MG/ML
500 INJECTION INTRAMUSCULAR; INTRAVENOUS ONCE
Status: DISCONTINUED | OUTPATIENT
Start: 2021-09-07 | End: 2021-09-07

## 2021-09-07 RX ORDER — TALC
6 POWDER (GRAM) TOPICAL NIGHTLY PRN
Status: DISCONTINUED | OUTPATIENT
Start: 2021-09-07 | End: 2021-09-08 | Stop reason: HOSPADM

## 2021-09-07 RX ORDER — FUROSEMIDE 10 MG/ML
40 INJECTION INTRAMUSCULAR; INTRAVENOUS 2 TIMES DAILY
Status: DISCONTINUED | OUTPATIENT
Start: 2021-09-07 | End: 2021-09-08

## 2021-09-07 RX ORDER — BISACODYL 10 MG
10 SUPPOSITORY, RECTAL RECTAL DAILY PRN
Status: DISCONTINUED | OUTPATIENT
Start: 2021-09-07 | End: 2021-09-08 | Stop reason: HOSPADM

## 2021-09-07 RX ORDER — IBUPROFEN 200 MG
1 TABLET ORAL DAILY
Status: DISCONTINUED | OUTPATIENT
Start: 2021-09-07 | End: 2021-09-08 | Stop reason: HOSPADM

## 2021-09-07 RX ADMIN — AMIODARONE HYDROCHLORIDE 1 MG/MIN: 1.8 INJECTION, SOLUTION INTRAVENOUS at 04:09

## 2021-09-07 RX ADMIN — NICOTINE 1 PATCH: 14 PATCH TRANSDERMAL at 09:09

## 2021-09-07 RX ADMIN — AMIODARONE HYDROCHLORIDE 400 MG: 200 TABLET ORAL at 01:09

## 2021-09-07 RX ADMIN — APIXABAN 5 MG: 5 TABLET, FILM COATED ORAL at 08:09

## 2021-09-07 RX ADMIN — FUROSEMIDE 40 MG: 10 INJECTION, SOLUTION INTRAMUSCULAR; INTRAVENOUS at 08:09

## 2021-09-07 RX ADMIN — CARVEDILOL 6.25 MG: 6.25 TABLET, FILM COATED ORAL at 08:09

## 2021-09-07 RX ADMIN — AMIODARONE HYDROCHLORIDE 0.5 MG/MIN: 1.8 INJECTION, SOLUTION INTRAVENOUS at 09:09

## 2021-09-07 RX ADMIN — SACUBITRIL AND VALSARTAN 1 TABLET: 24; 26 TABLET, FILM COATED ORAL at 08:09

## 2021-09-07 RX ADMIN — ASPIRIN 81 MG CHEWABLE TABLET 81 MG: 81 TABLET CHEWABLE at 08:09

## 2021-09-07 RX ADMIN — CARVEDILOL 3.12 MG: 3.12 TABLET, FILM COATED ORAL at 08:09

## 2021-09-07 RX ADMIN — AMIODARONE HYDROCHLORIDE 150 MG: 1.5 INJECTION, SOLUTION INTRAVENOUS at 03:09

## 2021-09-07 RX ADMIN — CARVEDILOL 3.12 MG: 3.12 TABLET, FILM COATED ORAL at 11:09

## 2021-09-08 VITALS
DIASTOLIC BLOOD PRESSURE: 64 MMHG | HEART RATE: 98 BPM | WEIGHT: 197.56 LBS | RESPIRATION RATE: 18 BRPM | HEIGHT: 67 IN | BODY MASS INDEX: 31.01 KG/M2 | OXYGEN SATURATION: 93 % | TEMPERATURE: 97 F | SYSTOLIC BLOOD PRESSURE: 92 MMHG

## 2021-09-08 LAB
ANION GAP SERPL CALC-SCNC: 13 MMOL/L (ref 8–16)
BASOPHILS # BLD AUTO: 0.07 K/UL (ref 0–0.2)
BASOPHILS NFR BLD: 0.8 % (ref 0–1.9)
BUN SERPL-MCNC: 27 MG/DL (ref 8–23)
CALCIUM SERPL-MCNC: 9 MG/DL (ref 8.7–10.5)
CHLORIDE SERPL-SCNC: 101 MMOL/L (ref 95–110)
CO2 SERPL-SCNC: 29 MMOL/L (ref 23–29)
CREAT SERPL-MCNC: 1.2 MG/DL (ref 0.5–1.4)
DIFFERENTIAL METHOD: ABNORMAL
EOSINOPHIL # BLD AUTO: 0.4 K/UL (ref 0–0.5)
EOSINOPHIL NFR BLD: 4.7 % (ref 0–8)
ERYTHROCYTE [DISTWIDTH] IN BLOOD BY AUTOMATED COUNT: 15 % (ref 11.5–14.5)
EST. GFR  (AFRICAN AMERICAN): >60 ML/MIN/1.73 M^2
EST. GFR  (NON AFRICAN AMERICAN): >60 ML/MIN/1.73 M^2
GLUCOSE SERPL-MCNC: 104 MG/DL (ref 70–110)
HCT VFR BLD AUTO: 48.8 % (ref 40–54)
HGB BLD-MCNC: 16.2 G/DL (ref 14–18)
IMM GRANULOCYTES # BLD AUTO: 0.02 K/UL (ref 0–0.04)
IMM GRANULOCYTES NFR BLD AUTO: 0.2 % (ref 0–0.5)
LYMPHOCYTES # BLD AUTO: 3.3 K/UL (ref 1–4.8)
LYMPHOCYTES NFR BLD: 36.8 % (ref 18–48)
MAGNESIUM SERPL-MCNC: 1.7 MG/DL (ref 1.6–2.6)
MCH RBC QN AUTO: 30.3 PG (ref 27–31)
MCHC RBC AUTO-ENTMCNC: 33.2 G/DL (ref 32–36)
MCV RBC AUTO: 91 FL (ref 82–98)
MONOCYTES # BLD AUTO: 0.8 K/UL (ref 0.3–1)
MONOCYTES NFR BLD: 9.2 % (ref 4–15)
NEUTROPHILS # BLD AUTO: 4.3 K/UL (ref 1.8–7.7)
NEUTROPHILS NFR BLD: 48.3 % (ref 38–73)
NRBC BLD-RTO: 0 /100 WBC
PHOSPHATE SERPL-MCNC: 4.7 MG/DL (ref 2.7–4.5)
PLATELET # BLD AUTO: 177 K/UL (ref 150–450)
PMV BLD AUTO: 11.4 FL (ref 9.2–12.9)
POTASSIUM SERPL-SCNC: 3 MMOL/L (ref 3.5–5.1)
RBC # BLD AUTO: 5.34 M/UL (ref 4.6–6.2)
SODIUM SERPL-SCNC: 143 MMOL/L (ref 136–145)
WBC # BLD AUTO: 8.95 K/UL (ref 3.9–12.7)

## 2021-09-08 PROCEDURE — 20600001 HC STEP DOWN PRIVATE ROOM

## 2021-09-08 PROCEDURE — 85025 COMPLETE CBC W/AUTO DIFF WBC: CPT | Performed by: PHYSICIAN ASSISTANT

## 2021-09-08 PROCEDURE — 25000003 PHARM REV CODE 250: Performed by: PHYSICIAN ASSISTANT

## 2021-09-08 PROCEDURE — 36415 COLL VENOUS BLD VENIPUNCTURE: CPT | Performed by: PHYSICIAN ASSISTANT

## 2021-09-08 PROCEDURE — 99232 PR SUBSEQUENT HOSPITAL CARE,LEVL II: ICD-10-PCS | Mod: ,,, | Performed by: INTERNAL MEDICINE

## 2021-09-08 PROCEDURE — 96366 THER/PROPH/DIAG IV INF ADDON: CPT

## 2021-09-08 PROCEDURE — 63600175 PHARM REV CODE 636 W HCPCS: Performed by: STUDENT IN AN ORGANIZED HEALTH CARE EDUCATION/TRAINING PROGRAM

## 2021-09-08 PROCEDURE — 25000003 PHARM REV CODE 250: Performed by: STUDENT IN AN ORGANIZED HEALTH CARE EDUCATION/TRAINING PROGRAM

## 2021-09-08 PROCEDURE — 80048 BASIC METABOLIC PNL TOTAL CA: CPT | Performed by: PHYSICIAN ASSISTANT

## 2021-09-08 PROCEDURE — 83735 ASSAY OF MAGNESIUM: CPT | Performed by: PHYSICIAN ASSISTANT

## 2021-09-08 PROCEDURE — 99232 SBSQ HOSP IP/OBS MODERATE 35: CPT | Mod: ,,, | Performed by: INTERNAL MEDICINE

## 2021-09-08 PROCEDURE — 84100 ASSAY OF PHOSPHORUS: CPT | Performed by: PHYSICIAN ASSISTANT

## 2021-09-08 RX ORDER — POTASSIUM CHLORIDE 20 MEQ/1
40 TABLET, EXTENDED RELEASE ORAL 2 TIMES DAILY
Status: DISCONTINUED | OUTPATIENT
Start: 2021-09-08 | End: 2021-09-08 | Stop reason: HOSPADM

## 2021-09-08 RX ORDER — CARVEDILOL 6.25 MG/1
6.25 TABLET ORAL 2 TIMES DAILY
Qty: 60 TABLET | Refills: 5 | Status: ON HOLD | OUTPATIENT
Start: 2021-09-08 | End: 2022-05-19 | Stop reason: SDUPTHER

## 2021-09-08 RX ORDER — POTASSIUM CHLORIDE 750 MG/1
10 TABLET, EXTENDED RELEASE ORAL DAILY
Qty: 30 TABLET | Refills: 2 | Status: ON HOLD | OUTPATIENT
Start: 2021-09-08 | End: 2022-05-19 | Stop reason: HOSPADM

## 2021-09-08 RX ORDER — FUROSEMIDE 40 MG/1
40 TABLET ORAL DAILY
Qty: 90 TABLET | Refills: 3 | Status: ON HOLD | OUTPATIENT
Start: 2021-09-08 | End: 2022-05-19 | Stop reason: HOSPADM

## 2021-09-08 RX ORDER — AMIODARONE HYDROCHLORIDE 200 MG/1
TABLET ORAL
Qty: 72 TABLET | Refills: 5 | Status: ON HOLD | OUTPATIENT
Start: 2021-09-09 | End: 2021-09-22 | Stop reason: SDUPTHER

## 2021-09-08 RX ORDER — AMIODARONE HYDROCHLORIDE 200 MG/1
400 TABLET ORAL DAILY
Status: DISCONTINUED | OUTPATIENT
Start: 2021-09-08 | End: 2021-09-08 | Stop reason: HOSPADM

## 2021-09-08 RX ORDER — POTASSIUM CHLORIDE 20 MEQ/1
40 TABLET, EXTENDED RELEASE ORAL ONCE
Status: COMPLETED | OUTPATIENT
Start: 2021-09-08 | End: 2021-09-08

## 2021-09-08 RX ORDER — MICONAZOLE NITRATE 2 %
POWDER (GRAM) TOPICAL 2 TIMES DAILY
Qty: 71 G | Refills: 1 | Status: ON HOLD | OUTPATIENT
Start: 2021-09-08 | End: 2022-05-19 | Stop reason: HOSPADM

## 2021-09-08 RX ORDER — MAGNESIUM SULFATE HEPTAHYDRATE 40 MG/ML
2 INJECTION, SOLUTION INTRAVENOUS ONCE
Status: COMPLETED | OUTPATIENT
Start: 2021-09-08 | End: 2021-09-08

## 2021-09-08 RX ADMIN — CARVEDILOL 6.25 MG: 6.25 TABLET, FILM COATED ORAL at 09:09

## 2021-09-08 RX ADMIN — POTASSIUM CHLORIDE 40 MEQ: 1500 TABLET, EXTENDED RELEASE ORAL at 01:09

## 2021-09-08 RX ADMIN — POTASSIUM CHLORIDE 40 MEQ: 1500 TABLET, EXTENDED RELEASE ORAL at 09:09

## 2021-09-08 RX ADMIN — AMIODARONE HYDROCHLORIDE 0.5 MG/MIN: 1.8 INJECTION, SOLUTION INTRAVENOUS at 12:09

## 2021-09-08 RX ADMIN — SACUBITRIL AND VALSARTAN 1 TABLET: 24; 26 TABLET, FILM COATED ORAL at 09:09

## 2021-09-08 RX ADMIN — APIXABAN 5 MG: 5 TABLET, FILM COATED ORAL at 09:09

## 2021-09-08 RX ADMIN — AMIODARONE HYDROCHLORIDE 400 MG: 200 TABLET ORAL at 04:09

## 2021-09-08 RX ADMIN — MAGNESIUM SULFATE 2 G: 2 INJECTION INTRAVENOUS at 11:09

## 2021-09-09 ENCOUNTER — PATIENT OUTREACH (OUTPATIENT)
Dept: ADMINISTRATIVE | Facility: CLINIC | Age: 61
End: 2021-09-09

## 2021-09-15 ENCOUNTER — TELEPHONE (OUTPATIENT)
Dept: ELECTROPHYSIOLOGY | Facility: CLINIC | Age: 61
End: 2021-09-15

## 2021-09-15 DIAGNOSIS — I48.91 ATRIAL FIBRILLATION WITH RVR: Primary | ICD-10-CM

## 2021-09-16 ENCOUNTER — TELEPHONE (OUTPATIENT)
Dept: ELECTROPHYSIOLOGY | Facility: CLINIC | Age: 61
End: 2021-09-16

## 2021-09-16 ENCOUNTER — HOSPITAL ENCOUNTER (OUTPATIENT)
Dept: CARDIOLOGY | Facility: CLINIC | Age: 61
Discharge: HOME | End: 2021-09-16
Payer: MEDICAID

## 2021-09-16 ENCOUNTER — OFFICE VISIT (OUTPATIENT)
Dept: ELECTROPHYSIOLOGY | Facility: CLINIC | Age: 61
End: 2021-09-16
Payer: MEDICAID

## 2021-09-16 DIAGNOSIS — I48.91 ATRIAL FIBRILLATION WITH RVR: ICD-10-CM

## 2021-09-16 PROCEDURE — 93010 ELECTROCARDIOGRAM REPORT: CPT | Mod: S$PBB,,, | Performed by: INTERNAL MEDICINE

## 2021-09-16 PROCEDURE — 99499 NO LOS: ICD-10-PCS | Mod: S$PBB,,, | Performed by: STUDENT IN AN ORGANIZED HEALTH CARE EDUCATION/TRAINING PROGRAM

## 2021-09-16 PROCEDURE — 93005 ELECTROCARDIOGRAM TRACING: CPT | Mod: PBBFAC | Performed by: INTERNAL MEDICINE

## 2021-09-16 PROCEDURE — 4010F ACE/ARB THERAPY RXD/TAKEN: CPT | Mod: CPTII,,, | Performed by: STUDENT IN AN ORGANIZED HEALTH CARE EDUCATION/TRAINING PROGRAM

## 2021-09-16 PROCEDURE — 99499 UNLISTED E&M SERVICE: CPT | Mod: S$PBB,,, | Performed by: STUDENT IN AN ORGANIZED HEALTH CARE EDUCATION/TRAINING PROGRAM

## 2021-09-16 PROCEDURE — 4010F PR ACE/ARB THEARPY RXD/TAKEN: ICD-10-PCS | Mod: CPTII,,, | Performed by: STUDENT IN AN ORGANIZED HEALTH CARE EDUCATION/TRAINING PROGRAM

## 2021-09-16 PROCEDURE — 93010 RHYTHM STRIP: ICD-10-PCS | Mod: S$PBB,,, | Performed by: INTERNAL MEDICINE

## 2021-09-17 ENCOUNTER — OFFICE VISIT (OUTPATIENT)
Dept: ELECTROPHYSIOLOGY | Facility: CLINIC | Age: 61
End: 2021-09-17
Payer: MEDICAID

## 2021-09-17 ENCOUNTER — TELEPHONE (OUTPATIENT)
Dept: ELECTROPHYSIOLOGY | Facility: CLINIC | Age: 61
End: 2021-09-17

## 2021-09-17 VITALS
WEIGHT: 198 LBS | HEIGHT: 67 IN | DIASTOLIC BLOOD PRESSURE: 70 MMHG | HEART RATE: 100 BPM | SYSTOLIC BLOOD PRESSURE: 110 MMHG | BODY MASS INDEX: 31.08 KG/M2

## 2021-09-17 DIAGNOSIS — I48.0 PAROXYSMAL ATRIAL FIBRILLATION: Primary | ICD-10-CM

## 2021-09-17 DIAGNOSIS — F41.9 ANXIETY: ICD-10-CM

## 2021-09-17 DIAGNOSIS — I50.41 ACUTE COMBINED SYSTOLIC AND DIASTOLIC CONGESTIVE HEART FAILURE: ICD-10-CM

## 2021-09-17 DIAGNOSIS — I48.0 PAROXYSMAL ATRIAL FIBRILLATION WITH RVR: Primary | ICD-10-CM

## 2021-09-17 PROCEDURE — 99214 PR OFFICE/OUTPT VISIT, EST, LEVL IV, 30-39 MIN: ICD-10-PCS | Mod: S$PBB,,, | Performed by: INTERNAL MEDICINE

## 2021-09-17 PROCEDURE — 99214 OFFICE O/P EST MOD 30 MIN: CPT | Mod: S$PBB,,, | Performed by: INTERNAL MEDICINE

## 2021-09-17 PROCEDURE — 99999 PR PBB SHADOW E&M-EST. PATIENT-LVL III: ICD-10-PCS | Mod: PBBFAC,,, | Performed by: INTERNAL MEDICINE

## 2021-09-17 PROCEDURE — 99213 OFFICE O/P EST LOW 20 MIN: CPT | Mod: PBBFAC | Performed by: INTERNAL MEDICINE

## 2021-09-17 PROCEDURE — 99999 PR PBB SHADOW E&M-EST. PATIENT-LVL III: CPT | Mod: PBBFAC,,, | Performed by: INTERNAL MEDICINE

## 2021-09-17 RX ORDER — PIMECROLIMUS 10 MG/G
CREAM TOPICAL
Status: ON HOLD | COMMUNITY
Start: 2021-06-14 | End: 2021-09-22 | Stop reason: HOSPADM

## 2021-09-17 RX ORDER — CETIRIZINE HYDROCHLORIDE 10 MG/1
10 TABLET ORAL DAILY
Status: ON HOLD | COMMUNITY
Start: 2021-06-14 | End: 2021-09-22 | Stop reason: HOSPADM

## 2021-09-17 RX ORDER — TAMSULOSIN HYDROCHLORIDE 0.4 MG/1
0.4 CAPSULE ORAL
Status: ON HOLD | COMMUNITY
Start: 2021-07-14 | End: 2021-09-22 | Stop reason: HOSPADM

## 2021-09-18 ENCOUNTER — HOSPITAL ENCOUNTER (EMERGENCY)
Facility: HOSPITAL | Age: 61
Discharge: HOME | End: 2021-09-18
Attending: EMERGENCY MEDICINE
Payer: MEDICAID

## 2021-09-18 VITALS
SYSTOLIC BLOOD PRESSURE: 106 MMHG | OXYGEN SATURATION: 98 % | DIASTOLIC BLOOD PRESSURE: 65 MMHG | RESPIRATION RATE: 15 BRPM | HEART RATE: 94 BPM | TEMPERATURE: 98 F

## 2021-09-18 DIAGNOSIS — I48.91 ATRIAL FIBRILLATION WITH RVR: ICD-10-CM

## 2021-09-18 DIAGNOSIS — R00.2 PALPITATIONS: Primary | ICD-10-CM

## 2021-09-18 LAB
ALBUMIN SERPL BCP-MCNC: 3.5 G/DL (ref 3.5–5.2)
ALP SERPL-CCNC: 70 U/L (ref 55–135)
ALT SERPL W/O P-5'-P-CCNC: 26 U/L (ref 10–44)
ANION GAP SERPL CALC-SCNC: 12 MMOL/L (ref 8–16)
AST SERPL-CCNC: 28 U/L (ref 10–40)
BASOPHILS # BLD AUTO: 0.07 K/UL (ref 0–0.2)
BASOPHILS NFR BLD: 0.8 % (ref 0–1.9)
BILIRUB SERPL-MCNC: 1.2 MG/DL (ref 0.1–1)
BNP SERPL-MCNC: 1212 PG/ML (ref 0–99)
BUN SERPL-MCNC: 18 MG/DL (ref 8–23)
CALCIUM SERPL-MCNC: 9.3 MG/DL (ref 8.7–10.5)
CHLORIDE SERPL-SCNC: 108 MMOL/L (ref 95–110)
CO2 SERPL-SCNC: 21 MMOL/L (ref 23–29)
CREAT SERPL-MCNC: 0.9 MG/DL (ref 0.5–1.4)
DIFFERENTIAL METHOD: NORMAL
EOSINOPHIL # BLD AUTO: 0.3 K/UL (ref 0–0.5)
EOSINOPHIL NFR BLD: 3.4 % (ref 0–8)
ERYTHROCYTE [DISTWIDTH] IN BLOOD BY AUTOMATED COUNT: 13.9 % (ref 11.5–14.5)
EST. GFR  (AFRICAN AMERICAN): >60 ML/MIN/1.73 M^2
EST. GFR  (NON AFRICAN AMERICAN): >60 ML/MIN/1.73 M^2
GLUCOSE SERPL-MCNC: 95 MG/DL (ref 70–110)
HCT VFR BLD AUTO: 50 % (ref 40–54)
HGB BLD-MCNC: 17 G/DL (ref 14–18)
IMM GRANULOCYTES # BLD AUTO: 0.01 K/UL (ref 0–0.04)
IMM GRANULOCYTES NFR BLD AUTO: 0.1 % (ref 0–0.5)
LYMPHOCYTES # BLD AUTO: 2.9 K/UL (ref 1–4.8)
LYMPHOCYTES NFR BLD: 34.7 % (ref 18–48)
MCH RBC QN AUTO: 30.5 PG (ref 27–31)
MCHC RBC AUTO-ENTMCNC: 34 G/DL (ref 32–36)
MCV RBC AUTO: 90 FL (ref 82–98)
MONOCYTES # BLD AUTO: 0.8 K/UL (ref 0.3–1)
MONOCYTES NFR BLD: 9.1 % (ref 4–15)
NEUTROPHILS # BLD AUTO: 4.4 K/UL (ref 1.8–7.7)
NEUTROPHILS NFR BLD: 51.9 % (ref 38–73)
NRBC BLD-RTO: 0 /100 WBC
PLATELET # BLD AUTO: 222 K/UL (ref 150–450)
PMV BLD AUTO: 10.7 FL (ref 9.2–12.9)
POTASSIUM SERPL-SCNC: 3.9 MMOL/L (ref 3.5–5.1)
PROT SERPL-MCNC: 6.3 G/DL (ref 6–8.4)
RBC # BLD AUTO: 5.58 M/UL (ref 4.6–6.2)
SODIUM SERPL-SCNC: 141 MMOL/L (ref 136–145)
TROPONIN I SERPL DL<=0.01 NG/ML-MCNC: 0.03 NG/ML (ref 0–0.03)
WBC # BLD AUTO: 8.44 K/UL (ref 3.9–12.7)

## 2021-09-18 PROCEDURE — 25000003 PHARM REV CODE 250: Performed by: PHYSICIAN ASSISTANT

## 2021-09-18 PROCEDURE — 93005 ELECTROCARDIOGRAM TRACING: CPT

## 2021-09-18 PROCEDURE — 99284 EMERGENCY DEPT VISIT MOD MDM: CPT | Mod: ,,, | Performed by: PHYSICIAN ASSISTANT

## 2021-09-18 PROCEDURE — 99284 PR EMERGENCY DEPT VISIT,LEVEL IV: ICD-10-PCS | Mod: ,,, | Performed by: PHYSICIAN ASSISTANT

## 2021-09-18 PROCEDURE — 84484 ASSAY OF TROPONIN QUANT: CPT | Performed by: PHYSICIAN ASSISTANT

## 2021-09-18 PROCEDURE — 80053 COMPREHEN METABOLIC PANEL: CPT | Performed by: PHYSICIAN ASSISTANT

## 2021-09-18 PROCEDURE — 93010 EKG 12-LEAD: ICD-10-PCS | Mod: ,,, | Performed by: INTERNAL MEDICINE

## 2021-09-18 PROCEDURE — 93010 ELECTROCARDIOGRAM REPORT: CPT | Mod: ,,, | Performed by: INTERNAL MEDICINE

## 2021-09-18 PROCEDURE — 85025 COMPLETE CBC W/AUTO DIFF WBC: CPT | Performed by: PHYSICIAN ASSISTANT

## 2021-09-18 PROCEDURE — 99284 EMERGENCY DEPT VISIT MOD MDM: CPT | Mod: 25

## 2021-09-18 PROCEDURE — 83880 ASSAY OF NATRIURETIC PEPTIDE: CPT | Performed by: PHYSICIAN ASSISTANT

## 2021-09-18 RX ORDER — CARVEDILOL 3.12 MG/1
6.25 TABLET ORAL
Status: COMPLETED | OUTPATIENT
Start: 2021-09-18 | End: 2021-09-18

## 2021-09-18 RX ORDER — AMIODARONE HYDROCHLORIDE 200 MG/1
400 TABLET ORAL
Status: COMPLETED | OUTPATIENT
Start: 2021-09-18 | End: 2021-09-18

## 2021-09-18 RX ADMIN — CARVEDILOL 6.25 MG: 3.12 TABLET, FILM COATED ORAL at 07:09

## 2021-09-18 RX ADMIN — AMIODARONE HYDROCHLORIDE 400 MG: 200 TABLET ORAL at 07:09

## 2021-09-19 DIAGNOSIS — I48.0 PAROXYSMAL ATRIAL FIBRILLATION: Primary | ICD-10-CM

## 2021-09-20 ENCOUNTER — TELEPHONE (OUTPATIENT)
Dept: ELECTROPHYSIOLOGY | Facility: CLINIC | Age: 61
End: 2021-09-20

## 2021-09-21 ENCOUNTER — TELEPHONE (OUTPATIENT)
Dept: ELECTROPHYSIOLOGY | Facility: CLINIC | Age: 61
End: 2021-09-21

## 2021-09-22 ENCOUNTER — HOSPITAL ENCOUNTER (OUTPATIENT)
Dept: CARDIOLOGY | Facility: HOSPITAL | Age: 61
Discharge: HOME | End: 2021-09-22
Attending: INTERNAL MEDICINE | Admitting: INTERNAL MEDICINE
Payer: MEDICAID

## 2021-09-22 ENCOUNTER — ANESTHESIA (OUTPATIENT)
Dept: MEDSURG UNIT | Facility: HOSPITAL | Age: 61
End: 2021-09-22
Payer: MEDICAID

## 2021-09-22 ENCOUNTER — TELEPHONE (OUTPATIENT)
Dept: ELECTROPHYSIOLOGY | Facility: CLINIC | Age: 61
End: 2021-09-22

## 2021-09-22 ENCOUNTER — SOCIAL WORK (OUTPATIENT)
Dept: TRANSPLANT | Facility: HOSPITAL | Age: 61
End: 2021-09-22

## 2021-09-22 ENCOUNTER — HOSPITAL ENCOUNTER (OUTPATIENT)
Facility: HOSPITAL | Age: 61
Discharge: HOME | End: 2021-09-22
Attending: INTERNAL MEDICINE | Admitting: INTERNAL MEDICINE
Payer: MEDICAID

## 2021-09-22 ENCOUNTER — ANESTHESIA EVENT (OUTPATIENT)
Dept: MEDSURG UNIT | Facility: HOSPITAL | Age: 61
End: 2021-09-22
Payer: MEDICAID

## 2021-09-22 VITALS
HEIGHT: 66 IN | WEIGHT: 198 LBS | DIASTOLIC BLOOD PRESSURE: 80 MMHG | SYSTOLIC BLOOD PRESSURE: 123 MMHG | BODY MASS INDEX: 31.82 KG/M2

## 2021-09-22 VITALS
WEIGHT: 198 LBS | HEART RATE: 63 BPM | SYSTOLIC BLOOD PRESSURE: 91 MMHG | TEMPERATURE: 98 F | OXYGEN SATURATION: 100 % | RESPIRATION RATE: 18 BRPM | HEIGHT: 66 IN | DIASTOLIC BLOOD PRESSURE: 57 MMHG | BODY MASS INDEX: 31.82 KG/M2

## 2021-09-22 DIAGNOSIS — I48.0 PAROXYSMAL ATRIAL FIBRILLATION: ICD-10-CM

## 2021-09-22 DIAGNOSIS — I48.91 ATRIAL FIBRILLATION: ICD-10-CM

## 2021-09-22 DIAGNOSIS — I48.91 ATRIAL FIBRILLATION WITH RVR: Primary | ICD-10-CM

## 2021-09-22 LAB
ASCENDING AORTA: 3.4 CM
BSA FOR ECHO PROCEDURE: 2.04 M2
EJECTION FRACTION: 15 %
SARS-COV-2 RDRP RESP QL NAA+PROBE: NEGATIVE
SINUS: 3.7 CM
STJ: 3 CM

## 2021-09-22 PROCEDURE — 93325 DOPPLER ECHO COLOR FLOW MAPG: CPT | Mod: 26,,, | Performed by: INTERNAL MEDICINE

## 2021-09-22 PROCEDURE — 37000009 HC ANESTHESIA EA ADD 15 MINS: Performed by: INTERNAL MEDICINE

## 2021-09-22 PROCEDURE — D9220A PRA ANESTHESIA: ICD-10-PCS | Mod: CRNA,,, | Performed by: NURSE ANESTHETIST, CERTIFIED REGISTERED

## 2021-09-22 PROCEDURE — D9220A PRA ANESTHESIA: Mod: CRNA,,, | Performed by: NURSE ANESTHETIST, CERTIFIED REGISTERED

## 2021-09-22 PROCEDURE — 93005 ELECTROCARDIOGRAM TRACING: CPT | Mod: 59

## 2021-09-22 PROCEDURE — 93312 ECHO TRANSESOPHAGEAL: CPT | Mod: 26,,, | Performed by: INTERNAL MEDICINE

## 2021-09-22 PROCEDURE — 93325 DOPPLER ECHO COLOR FLOW MAPG: CPT

## 2021-09-22 PROCEDURE — U0002 COVID-19 LAB TEST NON-CDC: HCPCS | Performed by: INTERNAL MEDICINE

## 2021-09-22 PROCEDURE — 25000003 PHARM REV CODE 250: Performed by: NURSE PRACTITIONER

## 2021-09-22 PROCEDURE — 92960 CARDIOVERSION ELECTRIC EXT: CPT | Mod: ,,, | Performed by: INTERNAL MEDICINE

## 2021-09-22 PROCEDURE — 01922 ANES N-INVAS IMG/RADJ THER: CPT | Performed by: INTERNAL MEDICINE

## 2021-09-22 PROCEDURE — 92960 CARDIOVERSION ELECTRIC EXT: CPT | Performed by: INTERNAL MEDICINE

## 2021-09-22 PROCEDURE — 93010 EKG 12-LEAD: ICD-10-PCS | Mod: ,,, | Performed by: INTERNAL MEDICINE

## 2021-09-22 PROCEDURE — 25000003 PHARM REV CODE 250: Performed by: NURSE ANESTHETIST, CERTIFIED REGISTERED

## 2021-09-22 PROCEDURE — 93010 ELECTROCARDIOGRAM REPORT: CPT | Mod: ,,, | Performed by: INTERNAL MEDICINE

## 2021-09-22 PROCEDURE — 93312 TRANSESOPHAGEAL ECHO (TEE) (CUPID ONLY): ICD-10-PCS | Mod: 26,,, | Performed by: INTERNAL MEDICINE

## 2021-09-22 PROCEDURE — 93005 ELECTROCARDIOGRAM TRACING: CPT

## 2021-09-22 PROCEDURE — 93325 TRANSESOPHAGEAL ECHO (TEE) (CUPID ONLY): ICD-10-PCS | Mod: 26,,, | Performed by: INTERNAL MEDICINE

## 2021-09-22 PROCEDURE — 37000008 HC ANESTHESIA 1ST 15 MINUTES: Performed by: INTERNAL MEDICINE

## 2021-09-22 PROCEDURE — 93320 TRANSESOPHAGEAL ECHO (TEE) (CUPID ONLY): ICD-10-PCS | Mod: 26,,, | Performed by: INTERNAL MEDICINE

## 2021-09-22 PROCEDURE — D9220A PRA ANESTHESIA: ICD-10-PCS | Mod: ANES,,, | Performed by: STUDENT IN AN ORGANIZED HEALTH CARE EDUCATION/TRAINING PROGRAM

## 2021-09-22 PROCEDURE — 92960 PR CARDIOVERSION, ELECTIVE;EXTERN: ICD-10-PCS | Mod: ,,, | Performed by: INTERNAL MEDICINE

## 2021-09-22 PROCEDURE — 93320 DOPPLER ECHO COMPLETE: CPT | Mod: 26,,, | Performed by: INTERNAL MEDICINE

## 2021-09-22 PROCEDURE — 63600175 PHARM REV CODE 636 W HCPCS: Performed by: NURSE ANESTHETIST, CERTIFIED REGISTERED

## 2021-09-22 PROCEDURE — D9220A PRA ANESTHESIA: Mod: ANES,,, | Performed by: STUDENT IN AN ORGANIZED HEALTH CARE EDUCATION/TRAINING PROGRAM

## 2021-09-22 PROCEDURE — 25000003 PHARM REV CODE 250: Performed by: INTERNAL MEDICINE

## 2021-09-22 RX ORDER — DEXMEDETOMIDINE HYDROCHLORIDE 100 UG/ML
INJECTION, SOLUTION INTRAVENOUS CONTINUOUS PRN
Status: DISCONTINUED | OUTPATIENT
Start: 2021-09-22 | End: 2021-09-22

## 2021-09-22 RX ORDER — SILVER SULFADIAZINE 10 G/1000G
CREAM TOPICAL
Status: DISCONTINUED | OUTPATIENT
Start: 2021-09-22 | End: 2021-09-22 | Stop reason: HOSPADM

## 2021-09-22 RX ORDER — ETOMIDATE 2 MG/ML
INJECTION INTRAVENOUS
Status: DISCONTINUED | OUTPATIENT
Start: 2021-09-22 | End: 2021-09-22

## 2021-09-22 RX ORDER — MIDAZOLAM HYDROCHLORIDE 1 MG/ML
INJECTION, SOLUTION INTRAMUSCULAR; INTRAVENOUS
Status: DISCONTINUED | OUTPATIENT
Start: 2021-09-22 | End: 2021-09-22

## 2021-09-22 RX ORDER — IBUPROFEN 200 MG
1 TABLET ORAL DAILY
Qty: 30 PATCH | Refills: 3 | Status: ON HOLD | OUTPATIENT
Start: 2021-09-22 | End: 2022-05-19 | Stop reason: SDUPTHER

## 2021-09-22 RX ORDER — AMIODARONE HYDROCHLORIDE 200 MG/1
200 TABLET ORAL DAILY
Qty: 90 TABLET | Refills: 1 | Status: ON HOLD | OUTPATIENT
Start: 2021-09-22 | End: 2022-05-19 | Stop reason: HOSPADM

## 2021-09-22 RX ORDER — LIDOCAINE HYDROCHLORIDE 20 MG/ML
INJECTION, SOLUTION EPIDURAL; INFILTRATION; INTRACAUDAL; PERINEURAL
Status: DISCONTINUED | OUTPATIENT
Start: 2021-09-22 | End: 2021-09-22

## 2021-09-22 RX ORDER — SODIUM CHLORIDE 0.9 % (FLUSH) 0.9 %
10 SYRINGE (ML) INJECTION
Status: DISCONTINUED | OUTPATIENT
Start: 2021-09-22 | End: 2021-09-22 | Stop reason: HOSPADM

## 2021-09-22 RX ORDER — FENTANYL CITRATE 50 UG/ML
25 INJECTION, SOLUTION INTRAMUSCULAR; INTRAVENOUS EVERY 5 MIN PRN
Status: DISCONTINUED | OUTPATIENT
Start: 2021-09-22 | End: 2021-09-22 | Stop reason: HOSPADM

## 2021-09-22 RX ORDER — LIDOCAINE HYDROCHLORIDE 20 MG/ML
SOLUTION OROPHARYNGEAL
Status: DISCONTINUED | OUTPATIENT
Start: 2021-09-22 | End: 2021-09-22

## 2021-09-22 RX ORDER — ONDANSETRON 2 MG/ML
INJECTION INTRAMUSCULAR; INTRAVENOUS
Status: DISCONTINUED | OUTPATIENT
Start: 2021-09-22 | End: 2021-09-22

## 2021-09-22 RX ORDER — ONDANSETRON 2 MG/ML
4 INJECTION INTRAMUSCULAR; INTRAVENOUS ONCE
Status: DISCONTINUED | OUTPATIENT
Start: 2021-09-22 | End: 2021-09-22 | Stop reason: HOSPADM

## 2021-09-22 RX ADMIN — ETOMIDATE 2 MG: 2 INJECTION INTRAVENOUS at 12:09

## 2021-09-22 RX ADMIN — ETOMIDATE 8 MG: 2 INJECTION INTRAVENOUS at 12:09

## 2021-09-22 RX ADMIN — LIDOCAINE HYDROCHLORIDE 80 MG: 20 INJECTION, SOLUTION EPIDURAL; INFILTRATION; INTRACAUDAL at 12:09

## 2021-09-22 RX ADMIN — SODIUM CHLORIDE 1000 ML: 0.9 INJECTION, SOLUTION INTRAVENOUS at 10:09

## 2021-09-22 RX ADMIN — MIDAZOLAM HYDROCHLORIDE 2 MG: 1 INJECTION, SOLUTION INTRAMUSCULAR; INTRAVENOUS at 12:09

## 2021-09-22 RX ADMIN — DEXMEDETOMIDINE HYDROCHLORIDE 0.7 MCG/KG/HR: 100 INJECTION, SOLUTION INTRAVENOUS at 12:09

## 2021-09-22 RX ADMIN — APIXABAN 5 MG: 5 TABLET, FILM COATED ORAL at 11:09

## 2021-09-22 RX ADMIN — ETOMIDATE 4 MG: 2 INJECTION INTRAVENOUS at 12:09

## 2021-09-22 RX ADMIN — LIDOCAINE HYDROCHLORIDE 15 ML: 20 SOLUTION ORAL; TOPICAL at 12:09

## 2021-09-22 RX ADMIN — ONDANSETRON 4 MG: 2 INJECTION INTRAMUSCULAR; INTRAVENOUS at 12:09

## 2021-09-22 RX ADMIN — SODIUM CHLORIDE: 0.9 INJECTION, SOLUTION INTRAVENOUS at 12:09

## 2022-05-03 ENCOUNTER — HOSPITAL ENCOUNTER (EMERGENCY)
Facility: HOSPITAL | Age: 62
Discharge: HOME | End: 2022-05-03
Attending: EMERGENCY MEDICINE
Payer: MEDICAID

## 2022-05-03 VITALS
HEIGHT: 66 IN | TEMPERATURE: 98 F | WEIGHT: 209 LBS | DIASTOLIC BLOOD PRESSURE: 79 MMHG | OXYGEN SATURATION: 96 % | HEART RATE: 79 BPM | RESPIRATION RATE: 20 BRPM | BODY MASS INDEX: 33.59 KG/M2 | SYSTOLIC BLOOD PRESSURE: 113 MMHG

## 2022-05-03 DIAGNOSIS — R07.9 CHEST PAIN: ICD-10-CM

## 2022-05-03 DIAGNOSIS — I48.0 PAROXYSMAL ATRIAL FIBRILLATION: Primary | ICD-10-CM

## 2022-05-03 LAB
ALBUMIN SERPL BCP-MCNC: 3.9 G/DL (ref 3.5–5.2)
ALP SERPL-CCNC: 58 U/L (ref 55–135)
ALT SERPL W/O P-5'-P-CCNC: 20 U/L (ref 10–44)
ANION GAP SERPL CALC-SCNC: 8 MMOL/L (ref 8–16)
AST SERPL-CCNC: 21 U/L (ref 10–40)
BASOPHILS # BLD AUTO: 0.09 K/UL (ref 0–0.2)
BASOPHILS NFR BLD: 0.9 % (ref 0–1.9)
BILIRUB SERPL-MCNC: 0.7 MG/DL (ref 0.1–1)
BUN SERPL-MCNC: 16 MG/DL (ref 8–23)
CALCIUM SERPL-MCNC: 9.9 MG/DL (ref 8.7–10.5)
CHLORIDE SERPL-SCNC: 104 MMOL/L (ref 95–110)
CO2 SERPL-SCNC: 29 MMOL/L (ref 23–29)
CREAT SERPL-MCNC: 0.8 MG/DL (ref 0.5–1.4)
DIFFERENTIAL METHOD: NORMAL
EOSINOPHIL # BLD AUTO: 0.4 K/UL (ref 0–0.5)
EOSINOPHIL NFR BLD: 3.8 % (ref 0–8)
ERYTHROCYTE [DISTWIDTH] IN BLOOD BY AUTOMATED COUNT: 13.4 % (ref 11.5–14.5)
EST. GFR  (AFRICAN AMERICAN): >60 ML/MIN/1.73 M^2
EST. GFR  (NON AFRICAN AMERICAN): >60 ML/MIN/1.73 M^2
GLUCOSE SERPL-MCNC: 100 MG/DL (ref 70–110)
HCT VFR BLD AUTO: 48.4 % (ref 40–54)
HGB BLD-MCNC: 16.4 G/DL (ref 14–18)
IMM GRANULOCYTES # BLD AUTO: 0.03 K/UL (ref 0–0.04)
IMM GRANULOCYTES NFR BLD AUTO: 0.3 % (ref 0–0.5)
LYMPHOCYTES # BLD AUTO: 3.9 K/UL (ref 1–4.8)
LYMPHOCYTES NFR BLD: 38.8 % (ref 18–48)
MCH RBC QN AUTO: 29.7 PG (ref 27–31)
MCHC RBC AUTO-ENTMCNC: 33.9 G/DL (ref 32–36)
MCV RBC AUTO: 88 FL (ref 82–98)
MONOCYTES # BLD AUTO: 0.8 K/UL (ref 0.3–1)
MONOCYTES NFR BLD: 8.2 % (ref 4–15)
NEUTROPHILS # BLD AUTO: 4.9 K/UL (ref 1.8–7.7)
NEUTROPHILS NFR BLD: 48 % (ref 38–73)
NRBC BLD-RTO: 0 /100 WBC
PLATELET # BLD AUTO: 280 K/UL (ref 150–450)
PMV BLD AUTO: 9.6 FL (ref 9.2–12.9)
POTASSIUM SERPL-SCNC: 4 MMOL/L (ref 3.5–5.1)
PROT SERPL-MCNC: 7.1 G/DL (ref 6–8.4)
RBC # BLD AUTO: 5.53 M/UL (ref 4.6–6.2)
SODIUM SERPL-SCNC: 141 MMOL/L (ref 136–145)
TROPONIN I SERPL DL<=0.01 NG/ML-MCNC: 0.02 NG/ML (ref 0–0.03)
WBC # BLD AUTO: 10.13 K/UL (ref 3.9–12.7)

## 2022-05-03 PROCEDURE — 99282 PR EMERGENCY DEPT VISIT,LEVEL II: ICD-10-PCS | Mod: ,,, | Performed by: EMERGENCY MEDICINE

## 2022-05-03 PROCEDURE — 93005 ELECTROCARDIOGRAM TRACING: CPT

## 2022-05-03 PROCEDURE — 99282 EMERGENCY DEPT VISIT SF MDM: CPT | Mod: ,,, | Performed by: EMERGENCY MEDICINE

## 2022-05-03 PROCEDURE — 93010 ELECTROCARDIOGRAM REPORT: CPT | Mod: ,,, | Performed by: INTERNAL MEDICINE

## 2022-05-03 PROCEDURE — 93010 EKG 12-LEAD: ICD-10-PCS | Mod: ,,, | Performed by: INTERNAL MEDICINE

## 2022-05-03 PROCEDURE — 80053 COMPREHEN METABOLIC PANEL: CPT | Performed by: NURSE PRACTITIONER

## 2022-05-03 PROCEDURE — 85025 COMPLETE CBC W/AUTO DIFF WBC: CPT | Performed by: NURSE PRACTITIONER

## 2022-05-03 PROCEDURE — 84484 ASSAY OF TROPONIN QUANT: CPT | Performed by: NURSE PRACTITIONER

## 2022-05-03 PROCEDURE — 99284 EMERGENCY DEPT VISIT MOD MDM: CPT | Mod: 25

## 2022-05-04 ENCOUNTER — TELEPHONE (OUTPATIENT)
Dept: ELECTROPHYSIOLOGY | Facility: CLINIC | Age: 62
End: 2022-05-04
Payer: MEDICAID

## 2022-05-04 NOTE — ED PROVIDER NOTES
"Encounter Date: 5/3/2022       History     Chief Complaint   Patient presents with    Dr. garrison     Told to come to ED for "abnormal heart rhythm" Hx afib, pt noncompliant with medication, denies cp/sob. States "I feel fine"     60 y/o m, h/o CHF, paroxysmal a fib, c/o palpitations today, "when I overexerted myself" though says they have since resolved.  Seen by PCP yesterday who was worried that pt was back in a fib and recommended referral to cardiology for repeat cardioversion.  Pt states that he is rx'd carvedilol but does not take it bc it makes him depressed.  He has not been taking eliquis but just got a rx yesterday and says it's filled/in his car and he plans on re-starting it tonight.  He says he was worried today about whether he was back in a fib and needs a referral to see cardiology here again.  Denies SOB/TOBIAS/PND/orthopnea.  Pt has a h/o CHF but is not on lasix bc says "that makes my symptoms worse."  Denies drugs/alcohol use.    The history is provided by the patient.     Review of patient's allergies indicates:   Allergen Reactions    Corticosteroids (glucocorticoids) Palpitations and Anaphylaxis    Eggs [egg derived] Anaphylaxis    Eggshell membrane Anaphylaxis    Calcium channel blocking agent diltiazem analogues Other (See Comments)    Cephalexin      Anaphylaxis^    Lorazepam Other (See Comments)     nausea  Heart RACING     Past Medical History:   Diagnosis Date    Anticoagulant long-term use     Anxiety     Atrial fibrillation     CHF (congestive heart failure)     Chronic back pain      Past Surgical History:   Procedure Laterality Date    APPENDECTOMY      CHOLECYSTECTOMY      HERNIA REPAIR      NASAL SEPTUM SURGERY      RECTAL SURGERY      TRANSESOPHAGEAL ECHOCARDIOGRAPHY N/A 8/26/2021    Procedure: ECHOCARDIOGRAM, TRANSESOPHAGEAL;  Surgeon: Mikey Nicole MD;  Location: [facility];  Service: Cardiology;  Laterality: N/A;    TREATMENT OF CARDIAC ARRHYTHMIA N/A " 2021    Procedure: CARDIOVERSION;  Surgeon: Jasbir Evans MD;  Location: [facility] EP LAB;  Service: Cardiology;  Laterality: N/A;  AF, NADIYA/DCCV, DM, ANes, 905    TREATMENT OF CARDIAC ARRHYTHMIA N/A 2021    Procedure: Cardioversion or Defibrillation;  Surgeon: Jasbir Evans MD;  Location: [facility] EP LAB;  Service: Cardiology;  Laterality: N/A;  AF, NADIYA, DCCV, MAC, DM, 3 Prep     Family History   Problem Relation Age of Onset    COPD Mother     Dementia Father      Social History     Tobacco Use    Smoking status: Former Smoker     Packs/day: 0.50     Types: Cigarettes     Quit date: 2010     Years since quittin.4    Smokeless tobacco: Never Used    Tobacco comment: using nicotine patch   Substance Use Topics    Alcohol use: No    Drug use: No     Review of Systems   Constitutional: Negative for fever.   Respiratory: Negative for cough, chest tightness and shortness of breath.    Cardiovascular: Positive for palpitations. Negative for chest pain and leg swelling.   Gastrointestinal: Negative for abdominal pain.   Neurological: Negative for dizziness, syncope, weakness and light-headedness.   All other systems reviewed and are negative.      Physical Exam     Initial Vitals [22]   BP Pulse Resp Temp SpO2   (!) 154/93 107 20 97.8 °F (36.6 °C) 99 %      MAP       --         Physical Exam    Nursing note and vitals reviewed.  Constitutional: Vital signs are normal. He appears well-developed and well-nourished. He is not diaphoretic.  Non-toxic appearance. He does not appear ill. No distress.   HENT:   Head: Normocephalic and atraumatic.   Mouth/Throat: Oropharynx is clear and moist and mucous membranes are normal. Mucous membranes are not dry.   Eyes: Conjunctivae and lids are normal.   Neck: Neck supple.   Normal range of motion.  Cardiovascular: An irregularly irregular rhythm present.    Pulmonary/Chest: Breath sounds normal. No respiratory distress. He has no wheezes. He has no  rhonchi. He has no rales.   Abdominal: Abdomen is soft. He exhibits no distension. There is no abdominal tenderness. There is no rebound.   Musculoskeletal:      Cervical back: Normal range of motion and neck supple.     Neurological: He is alert and oriented to person, place, and time.   Skin: Skin is dry and intact. No pallor.   Psychiatric: He has a normal mood and affect. His speech is normal and behavior is normal.         ED Course   Procedures  Labs Reviewed   CBC W/ AUTO DIFFERENTIAL   COMPREHENSIVE METABOLIC PANEL   TROPONIN I     EKG Readings: (Independently Interpreted)   Initial Reading: No STEMI. Rhythm: Atrial Fibrillation. Conduction: RBBB.       Imaging Results    None          Medications - No data to display  Medical Decision Making:   History:   Old Medical Records: I decided to obtain old medical records.  Initial Assessment:   Known paroxysmal a fib, with pt back in a fib since at least yesterday (As documented during PCP visit)  asx at this time  Initially mildly tachy but currently with HR   BP normal, no clinical signs of heart failure on exam    Think pt would benefit from initiation of coreg but he is declining though says he will restart his eliquis tonight  As pt relatively well-controlled from rate perspective and no other sx, no indication for emergent management or admission  Will give referral back to cards   Clinical Tests:   Lab Tests: Ordered and Reviewed  Medical Tests: Ordered and Reviewed                      Clinical Impression:   Final diagnoses:  [R07.9] Chest pain  [I48.0] Paroxysmal atrial fibrillation (Primary)          ED Disposition Condition    Discharge Stable        ED Prescriptions     None        Follow-up Information     Follow up With Specialties Details Why Contact Info    Jasbir Evans MD Electrophysiology, Cardiology Schedule an appointment as soon as possible for a visit   [address] [place] [id number]  [phone]             Swathi MITCHELL  MD Jose  05/04/22 0258

## 2022-05-04 NOTE — FIRST PROVIDER EVALUATION
" Emergency Department TeleTriage Encounter Note      CHIEF COMPLAINT    Chief Complaint   Patient presents with    Dr. garrison     Told to come to ED for "abnormal heart rhythm" Hx afib, pt noncompliant with medication, denies cp/sob. States "I feel fine"       VITAL SIGNS   Initial Vitals [05/03/22 2112]   BP Pulse Resp Temp SpO2   (!) 154/93 107 20 97.8 °F (36.6 °C) 99 %      MAP       --            ALLERGIES    Review of patient's allergies indicates:   Allergen Reactions    Corticosteroids (glucocorticoids) Palpitations and Anaphylaxis    Eggs [egg derived] Anaphylaxis    Eggshell membrane Anaphylaxis    Calcium channel blocking agent diltiazem analogues Other (See Comments)    Cephalexin      Anaphylaxis^    Lorazepam Other (See Comments)     nausea  Heart RACING       PROVIDER TRIAGE NOTE  This is a teletriage evaluation of a 61 y.o. male presenting to the ED with c/o "I think im in afib"  .  No cp, no sob.      ROS: (-) fever, (-) vomiting  PE:  NAD    Initial orders will be placed and care will be transferred to an alternate provider when patient is roomed for a full evaluation. Any additional orders and the final disposition will be determined by that provider.         ORDERS  Labs Reviewed   CBC W/ AUTO DIFFERENTIAL   COMPREHENSIVE METABOLIC PANEL   TROPONIN I       ED Orders (720h ago, onward)    Start Ordered     Status Ordering Provider    05/03/22 2145 05/03/22 2145  Vital signs  Every 15 min         Ordered JOSE MARTIN    05/03/22 2145 05/03/22 2145  Cardiac Monitoring - Adult  Continuous        Comments: Notify Physician If:    Ordered JOSE MARTIN    05/03/22 2145 05/03/22 2145  Pulse Oximetry Continuous  Continuous         Ordered JOSE MARTIN    05/03/22 2145 05/03/22 2145  Diet NPO  Diet effective now         Ordered JOSE MARTIN    05/03/22 2145 05/03/22 2145  Saline lock IV  Once         Ordered JOSE MARTIN    05/03/22 2145 05/03/22 2145  EKG 12-lead  " Once        Comments: Do not perform if previously done during this visit/ triage    Ordered VERONICA JOSE A.    05/03/22 2145 05/03/22 2145  CBC auto differential  STAT         Ordered JOSE MARTIN    05/03/22 2145 05/03/22 2145  Comprehensive metabolic panel  STAT         Ordered JOSE MARTIN    05/03/22 2145 05/03/22 2145  Troponin I #1  STAT         Ordered JOSE MARTIN            Virtual Visit Note: The provider triage portion of this emergency department evaluation and documentation was performed via [employer], a HIPAA-compliant telemedicine application, in concert with a tele-presenter in the room. A face to face patient evaluation with one of my colleagues will occur once the patient is placed in an emergency department room.      DISCLAIMER: This note was prepared with [employer] voice recognition transcription software. Garbled syntax, mangled pronouns, and other bizarre constructions may be attributed to that software system.

## 2022-05-04 NOTE — TELEPHONE ENCOUNTER
----- Message from Nery Parker MA sent at 5/4/2022  9:26 AM CDT -----  Regarding: FW: AFIB    ----- Message -----  From: Koki Bautista  Sent: 5/4/2022   9:14 AM CDT  To: [facility] Staff  Subject: AFIB                                             The pt is calling to report that he is in AFIB. Please call him back @ [phone]. Thanks, Koki

## 2022-05-16 ENCOUNTER — TELEPHONE (OUTPATIENT)
Dept: ELECTROPHYSIOLOGY | Facility: CLINIC | Age: 62
End: 2022-05-16
Payer: MEDICAID

## 2022-05-16 NOTE — TELEPHONE ENCOUNTER
Left v/m stating that DM does not do cardiac clearance, only med holds so he needs to make an appt w/ his general card or PCP.     ----- Message from Cassi Noyola sent at 5/16/2022 11:12 AM CDT -----  Regarding: Appt  Contact: [phone]  Patient Graham is calling. Patient stated he was in A-fib last week. Patient need cardiac clearance to have dental work done. Patient would like to be seen asap. Please call patient [phone]      Thank You

## 2022-05-17 ENCOUNTER — HOSPITAL ENCOUNTER (INPATIENT)
Facility: HOSPITAL | Age: 62
LOS: 2 days | Discharge: HOME | DRG: 309 | End: 2022-05-19
Attending: EMERGENCY MEDICINE | Admitting: HOSPITALIST
Payer: MEDICAID

## 2022-05-17 ENCOUNTER — ANESTHESIA EVENT (OUTPATIENT)
Dept: CARDIOLOGY | Facility: HOSPITAL | Age: 62
DRG: 309 | End: 2022-05-17
Payer: MEDICAID

## 2022-05-17 DIAGNOSIS — R07.9 CHEST PAIN: ICD-10-CM

## 2022-05-17 DIAGNOSIS — R00.0 TACHYCARDIA: ICD-10-CM

## 2022-05-17 DIAGNOSIS — I48.91 A-FIB: ICD-10-CM

## 2022-05-17 DIAGNOSIS — I48.0 PAROXYSMAL ATRIAL FIBRILLATION WITH RVR: Primary | ICD-10-CM

## 2022-05-17 PROBLEM — I50.42 CHRONIC COMBINED SYSTOLIC AND DIASTOLIC CONGESTIVE HEART FAILURE: Status: ACTIVE | Noted: 2021-08-09

## 2022-05-17 LAB
ALBUMIN SERPL BCP-MCNC: 4.5 G/DL (ref 3.5–5.2)
ALP SERPL-CCNC: 57 U/L (ref 55–135)
ALT SERPL W/O P-5'-P-CCNC: 28 U/L (ref 10–44)
ANION GAP SERPL CALC-SCNC: 10 MMOL/L (ref 8–16)
ANION GAP SERPL CALC-SCNC: 8 MMOL/L (ref 8–16)
AORTIC ROOT ANNULUS: 3.17 CM
AORTIC VALVE CUSP SEPERATION: 2.25 CM
ASCENDING AORTA: 3.22 CM
AST SERPL-CCNC: 26 U/L (ref 10–40)
AV INDEX (PROSTH): 0.67
AV MEAN GRADIENT: 2 MMHG
AV PEAK GRADIENT: 3 MMHG
AV VALVE AREA: 2.65 CM2
AV VELOCITY RATIO: 0.67
BASOPHILS # BLD AUTO: 0.08 K/UL (ref 0–0.2)
BASOPHILS NFR BLD: 0.9 % (ref 0–1.9)
BILIRUB SERPL-MCNC: 1.5 MG/DL (ref 0.1–1)
BNP SERPL-MCNC: 354 PG/ML (ref 0–99)
BUN SERPL-MCNC: 15 MG/DL (ref 8–23)
BUN SERPL-MCNC: 18 MG/DL (ref 8–23)
CALCIUM SERPL-MCNC: 10.3 MG/DL (ref 8.7–10.5)
CALCIUM SERPL-MCNC: 9.5 MG/DL (ref 8.7–10.5)
CHLORIDE SERPL-SCNC: 104 MMOL/L (ref 95–110)
CHLORIDE SERPL-SCNC: 105 MMOL/L (ref 95–110)
CO2 SERPL-SCNC: 27 MMOL/L (ref 23–29)
CO2 SERPL-SCNC: 27 MMOL/L (ref 23–29)
CREAT SERPL-MCNC: 1.2 MG/DL (ref 0.5–1.4)
CREAT SERPL-MCNC: 1.4 MG/DL (ref 0.5–1.4)
CTP QC/QA: YES
CV ECHO LV RWT: 0.44 CM
DIFFERENTIAL METHOD: ABNORMAL
DOP CALC AO PEAK VEL: 0.89 M/S
DOP CALC AO VTI: 15.33 CM
DOP CALC LVOT AREA: 3.9 CM2
DOP CALC LVOT DIAMETER: 2.24 CM
DOP CALC LVOT PEAK VEL: 0.6 M/S
DOP CALC LVOT STROKE VOLUME: 40.61 CM3
DOP CALCLVOT PEAK VEL VTI: 10.31 CM
ECHO LV POSTERIOR WALL: 1.24 CM (ref 0.6–1.1)
EJECTION FRACTION: 20 %
EOSINOPHIL # BLD AUTO: 0.3 K/UL (ref 0–0.5)
EOSINOPHIL NFR BLD: 3.5 % (ref 0–8)
ERYTHROCYTE [DISTWIDTH] IN BLOOD BY AUTOMATED COUNT: 13.7 % (ref 11.5–14.5)
ERYTHROCYTE [DISTWIDTH] IN BLOOD BY AUTOMATED COUNT: 13.7 % (ref 11.5–14.5)
EST. GFR  (AFRICAN AMERICAN): >60 ML/MIN/1.73 M^2
EST. GFR  (AFRICAN AMERICAN): >60 ML/MIN/1.73 M^2
EST. GFR  (NON AFRICAN AMERICAN): 54 ML/MIN/1.73 M^2
EST. GFR  (NON AFRICAN AMERICAN): >60 ML/MIN/1.73 M^2
FRACTIONAL SHORTENING: 12 % (ref 28–44)
GLUCOSE SERPL-MCNC: 123 MG/DL (ref 70–110)
GLUCOSE SERPL-MCNC: 93 MG/DL (ref 70–110)
HCT VFR BLD AUTO: 48.2 % (ref 40–54)
HCT VFR BLD AUTO: 54.9 % (ref 40–54)
HGB BLD-MCNC: 16.4 G/DL (ref 14–18)
HGB BLD-MCNC: 18.5 G/DL (ref 14–18)
IMM GRANULOCYTES # BLD AUTO: 0.02 K/UL (ref 0–0.04)
IMM GRANULOCYTES NFR BLD AUTO: 0.2 % (ref 0–0.5)
INR PPP: 1 (ref 0.8–1.2)
INTERVENTRICULAR SEPTUM: 1.19 CM (ref 0.6–1.1)
LA MAJOR: 6.6 CM
LA MINOR: 6.2 CM
LA WIDTH: 4.62 CM
LEFT ATRIUM SIZE: 4.64 CM
LEFT ATRIUM VOLUME: 116.5 CM3
LEFT INTERNAL DIMENSION IN SYSTOLE: 4.95 CM (ref 2.1–4)
LEFT VENTRICLE DIASTOLIC VOLUME: 156.41 ML
LEFT VENTRICLE SYSTOLIC VOLUME: 115.28 ML
LEFT VENTRICULAR INTERNAL DIMENSION IN DIASTOLE: 5.64 CM (ref 3.5–6)
LEFT VENTRICULAR MASS: 288.6 G
LYMPHOCYTES # BLD AUTO: 2.8 K/UL (ref 1–4.8)
LYMPHOCYTES NFR BLD: 32.4 % (ref 18–48)
MCH RBC QN AUTO: 30.4 PG (ref 27–31)
MCH RBC QN AUTO: 30.4 PG (ref 27–31)
MCHC RBC AUTO-ENTMCNC: 33.7 G/DL (ref 32–36)
MCHC RBC AUTO-ENTMCNC: 34 G/DL (ref 32–36)
MCV RBC AUTO: 89 FL (ref 82–98)
MCV RBC AUTO: 90 FL (ref 82–98)
MONOCYTES # BLD AUTO: 0.7 K/UL (ref 0.3–1)
MONOCYTES NFR BLD: 8.5 % (ref 4–15)
NEUTROPHILS # BLD AUTO: 4.7 K/UL (ref 1.8–7.7)
NEUTROPHILS NFR BLD: 54.5 % (ref 38–73)
NRBC BLD-RTO: 0 /100 WBC
PISA TR MAX VEL: 2.12 M/S
PLATELET # BLD AUTO: 281 K/UL (ref 150–450)
PLATELET # BLD AUTO: 289 K/UL (ref 150–450)
PMV BLD AUTO: 10 FL (ref 9.2–12.9)
PMV BLD AUTO: 9.8 FL (ref 9.2–12.9)
POCT GLUCOSE: 124 MG/DL (ref 70–110)
POTASSIUM SERPL-SCNC: 4.2 MMOL/L (ref 3.5–5.1)
POTASSIUM SERPL-SCNC: 4.5 MMOL/L (ref 3.5–5.1)
PROT SERPL-MCNC: 8 G/DL (ref 6–8.4)
PROTHROMBIN TIME: 10.9 SEC (ref 9–12.5)
PV PEAK VELOCITY: 0.65 CM/S
RA MAJOR: 6.53 CM
RA PRESSURE: 8 MMHG
RA WIDTH: 4.98 CM
RBC # BLD AUTO: 5.4 M/UL (ref 4.6–6.2)
RBC # BLD AUTO: 6.09 M/UL (ref 4.6–6.2)
RIGHT VENTRICULAR END-DIASTOLIC DIMENSION: 3.28 CM
SARS-COV-2 RDRP RESP QL NAA+PROBE: NEGATIVE
SINUS: 3.46 CM
SODIUM SERPL-SCNC: 140 MMOL/L (ref 136–145)
SODIUM SERPL-SCNC: 141 MMOL/L (ref 136–145)
STJ: 2.95 CM
TR MAX PG: 18 MMHG
TROPONIN I SERPL DL<=0.01 NG/ML-MCNC: 0.02 NG/ML (ref 0–0.03)
TV REST PULMONARY ARTERY PRESSURE: 26 MMHG
WBC # BLD AUTO: 10.79 K/UL (ref 3.9–12.7)
WBC # BLD AUTO: 8.68 K/UL (ref 3.9–12.7)

## 2022-05-17 PROCEDURE — 94761 N-INVAS EAR/PLS OXIMETRY MLT: CPT

## 2022-05-17 PROCEDURE — 93010 ELECTROCARDIOGRAM REPORT: CPT | Mod: ,,, | Performed by: INTERNAL MEDICINE

## 2022-05-17 PROCEDURE — 85027 COMPLETE CBC AUTOMATED: CPT | Performed by: INTERNAL MEDICINE

## 2022-05-17 PROCEDURE — 84484 ASSAY OF TROPONIN QUANT: CPT

## 2022-05-17 PROCEDURE — 96376 TX/PRO/DX INJ SAME DRUG ADON: CPT

## 2022-05-17 PROCEDURE — 25000003 PHARM REV CODE 250: Performed by: HOSPITALIST

## 2022-05-17 PROCEDURE — 93010 EKG 12-LEAD: ICD-10-PCS | Mod: ,,, | Performed by: INTERNAL MEDICINE

## 2022-05-17 PROCEDURE — 63600175 PHARM REV CODE 636 W HCPCS

## 2022-05-17 PROCEDURE — 99285 EMERGENCY DEPT VISIT HI MDM: CPT | Mod: 25

## 2022-05-17 PROCEDURE — 83880 ASSAY OF NATRIURETIC PEPTIDE: CPT

## 2022-05-17 PROCEDURE — 36415 COLL VENOUS BLD VENIPUNCTURE: CPT | Performed by: HOSPITALIST

## 2022-05-17 PROCEDURE — 20000000 HC ICU ROOM

## 2022-05-17 PROCEDURE — 80053 COMPREHEN METABOLIC PANEL: CPT

## 2022-05-17 PROCEDURE — 80048 BASIC METABOLIC PNL TOTAL CA: CPT | Mod: XB | Performed by: INTERNAL MEDICINE

## 2022-05-17 PROCEDURE — 96374 THER/PROPH/DIAG INJ IV PUSH: CPT

## 2022-05-17 PROCEDURE — U0002 COVID-19 LAB TEST NON-CDC: HCPCS | Performed by: HOSPITALIST

## 2022-05-17 PROCEDURE — S4991 NICOTINE PATCH NONLEGEND: HCPCS | Performed by: HOSPITALIST

## 2022-05-17 PROCEDURE — 85610 PROTHROMBIN TIME: CPT | Performed by: INTERNAL MEDICINE

## 2022-05-17 PROCEDURE — 82962 GLUCOSE BLOOD TEST: CPT

## 2022-05-17 PROCEDURE — 99291 PR CRITICAL CARE, E/M 30-74 MINUTES: ICD-10-PCS | Mod: ,,, | Performed by: INTERNAL MEDICINE

## 2022-05-17 PROCEDURE — 93005 ELECTROCARDIOGRAM TRACING: CPT

## 2022-05-17 PROCEDURE — 83036 HEMOGLOBIN GLYCOSYLATED A1C: CPT | Performed by: HOSPITALIST

## 2022-05-17 PROCEDURE — 96372 THER/PROPH/DIAG INJ SC/IM: CPT

## 2022-05-17 PROCEDURE — 99291 CRITICAL CARE FIRST HOUR: CPT | Mod: ,,, | Performed by: INTERNAL MEDICINE

## 2022-05-17 PROCEDURE — 85025 COMPLETE CBC W/AUTO DIFF WBC: CPT

## 2022-05-17 PROCEDURE — A4216 STERILE WATER/SALINE, 10 ML: HCPCS | Performed by: HOSPITALIST

## 2022-05-17 RX ORDER — NALOXONE HCL 0.4 MG/ML
0.02 VIAL (ML) INJECTION
Status: DISCONTINUED | OUTPATIENT
Start: 2022-05-17 | End: 2022-05-19 | Stop reason: HOSPADM

## 2022-05-17 RX ORDER — ENOXAPARIN SODIUM 100 MG/ML
40 INJECTION SUBCUTANEOUS EVERY 24 HOURS
Status: DISCONTINUED | OUTPATIENT
Start: 2022-05-17 | End: 2022-05-17

## 2022-05-17 RX ORDER — METOPROLOL TARTRATE 1 MG/ML
5 INJECTION, SOLUTION INTRAVENOUS EVERY 5 MIN PRN
Status: DISCONTINUED | OUTPATIENT
Start: 2022-05-17 | End: 2022-05-17

## 2022-05-17 RX ORDER — IBUPROFEN 200 MG
1 TABLET ORAL DAILY
Status: DISCONTINUED | OUTPATIENT
Start: 2022-05-18 | End: 2022-05-17

## 2022-05-17 RX ORDER — GLUCAGON 1 MG
1 KIT INJECTION
Status: DISCONTINUED | OUTPATIENT
Start: 2022-05-17 | End: 2022-05-19 | Stop reason: HOSPADM

## 2022-05-17 RX ORDER — TALC
6 POWDER (GRAM) TOPICAL NIGHTLY PRN
Status: DISCONTINUED | OUTPATIENT
Start: 2022-05-17 | End: 2022-05-19 | Stop reason: HOSPADM

## 2022-05-17 RX ORDER — ENOXAPARIN SODIUM 100 MG/ML
1 INJECTION SUBCUTANEOUS
Status: DISCONTINUED | OUTPATIENT
Start: 2022-05-17 | End: 2022-05-17

## 2022-05-17 RX ORDER — ACETAMINOPHEN 325 MG/1
650 TABLET ORAL EVERY 4 HOURS PRN
Status: DISCONTINUED | OUTPATIENT
Start: 2022-05-17 | End: 2022-05-19 | Stop reason: HOSPADM

## 2022-05-17 RX ORDER — IBUPROFEN 200 MG
16 TABLET ORAL
Status: DISCONTINUED | OUTPATIENT
Start: 2022-05-17 | End: 2022-05-19 | Stop reason: HOSPADM

## 2022-05-17 RX ORDER — SODIUM CHLORIDE 0.9 % (FLUSH) 0.9 %
10 SYRINGE (ML) INJECTION EVERY 8 HOURS
Status: DISCONTINUED | OUTPATIENT
Start: 2022-05-17 | End: 2022-05-19 | Stop reason: HOSPADM

## 2022-05-17 RX ORDER — SODIUM,POTASSIUM PHOSPHATES 280-250MG
2 POWDER IN PACKET (EA) ORAL
Status: DISCONTINUED | OUTPATIENT
Start: 2022-05-17 | End: 2022-05-19 | Stop reason: HOSPADM

## 2022-05-17 RX ORDER — LANOLIN ALCOHOL/MO/W.PET/CERES
800 CREAM (GRAM) TOPICAL
Status: DISCONTINUED | OUTPATIENT
Start: 2022-05-17 | End: 2022-05-19 | Stop reason: HOSPADM

## 2022-05-17 RX ORDER — IBUPROFEN 200 MG
1 TABLET ORAL DAILY
Status: DISCONTINUED | OUTPATIENT
Start: 2022-05-17 | End: 2022-05-19 | Stop reason: HOSPADM

## 2022-05-17 RX ORDER — IBUPROFEN 200 MG
24 TABLET ORAL
Status: DISCONTINUED | OUTPATIENT
Start: 2022-05-17 | End: 2022-05-19 | Stop reason: HOSPADM

## 2022-05-17 RX ORDER — ONDANSETRON 2 MG/ML
4 INJECTION INTRAMUSCULAR; INTRAVENOUS EVERY 8 HOURS PRN
Status: DISCONTINUED | OUTPATIENT
Start: 2022-05-17 | End: 2022-05-19 | Stop reason: HOSPADM

## 2022-05-17 RX ORDER — ENOXAPARIN SODIUM 100 MG/ML
1 INJECTION SUBCUTANEOUS ONCE
Status: COMPLETED | OUTPATIENT
Start: 2022-05-17 | End: 2022-05-17

## 2022-05-17 RX ORDER — INSULIN ASPART 100 [IU]/ML
0-5 INJECTION, SOLUTION INTRAVENOUS; SUBCUTANEOUS
Status: DISCONTINUED | OUTPATIENT
Start: 2022-05-17 | End: 2022-05-19 | Stop reason: HOSPADM

## 2022-05-17 RX ORDER — ENOXAPARIN SODIUM 100 MG/ML
1 INJECTION SUBCUTANEOUS
Status: DISCONTINUED | OUTPATIENT
Start: 2022-05-18 | End: 2022-05-18

## 2022-05-17 RX ORDER — AMOXICILLIN 250 MG
1 CAPSULE ORAL 2 TIMES DAILY
Status: DISCONTINUED | OUTPATIENT
Start: 2022-05-17 | End: 2022-05-19 | Stop reason: HOSPADM

## 2022-05-17 RX ADMIN — SENNOSIDES AND DOCUSATE SODIUM 1 TABLET: 50; 8.6 TABLET ORAL at 09:05

## 2022-05-17 RX ADMIN — Medication 10 ML: at 09:05

## 2022-05-17 RX ADMIN — AMIODARONE HYDROCHLORIDE 0.5 MG/MIN: 1.8 INJECTION, SOLUTION INTRAVENOUS at 05:05

## 2022-05-17 RX ADMIN — AMIODARONE HYDROCHLORIDE 1 MG/MIN: 1.8 INJECTION, SOLUTION INTRAVENOUS at 11:05

## 2022-05-17 RX ADMIN — Medication 1 PATCH: at 04:05

## 2022-05-17 RX ADMIN — AMIODARONE HYDROCHLORIDE 150 MG: 1.5 INJECTION, SOLUTION INTRAVENOUS at 12:05

## 2022-05-17 RX ADMIN — ENOXAPARIN SODIUM 100 MG: 100 INJECTION SUBCUTANEOUS at 12:05

## 2022-05-17 RX ADMIN — Medication 10 ML: at 04:05

## 2022-05-17 NOTE — ED NOTES
Attempt to call report, unable to do so at this time due to this nurse attending to pt needs. Will attempt to call again as soon as possible.

## 2022-05-17 NOTE — HPI
Mr Timmy Wade is a 61 y.o. man with paroxysmal Afib, chronic systolic/diastolic CHF who presents with lightheadedness.     He has history of CHF with exacerbation and Afib RVR requiring cardioversion in the past. He states he was seen in follow up and told that his heart failure had resolved. He is not currently taking any medications at home. He does smoke cigarettes. He denies chest pain, shortness of breath, palpitations, nausea, vomiting, lower extremity edema, abdominal distension.     In ED, he was found to have Afib RVR. Started amio gtt.

## 2022-05-17 NOTE — ED PROVIDER NOTES
"Encounter Date: 5/17/2022    SCRIBE #1 NOTE: I, Neto Reyes, am scribing for, and in the presence of,  Desmond Negro MD. I have scribed the following portions of the note - Other sections scribed: HPI, ROS, PE.       History     Chief Complaint   Patient presents with    Dizziness     Pt reporting light headedness that started this morning. Pt denies weakness/numbness. Pt reports he didn't eat today. Pt sweating in triage but reports he walked to hospital. Pt has a med hx of A-fib.      Timmy Wade is a 61 y. o male with a PMHx of anticoagulant long-term us, A-fib, anxiety, CHF, and chronic back pain, that comes to the ED complaining of dizziness beginning this morning. The patient states he started experiencing dizziness this morning, stating he has experienced similar symptoms with past A-fib episodes. The patient states he walked to the hospital, and notes he didn't eat today. The patient is compliant with Eliquis, which he reports he stopped taking yesterday. Patient notes he was seen last week for A-fib, attesting his symptoms were worse then. Patient also notes he was given metoprolol once in the ED for his A-fib and he had "a bad reaction for it". No medications taken PTA. No alleviating or exacerbating factors noted. Patient denies cough, shortness of breath, chest pain, fever, chills, abdominal pain, nausea, vomiting, diarrhea, dysuria, headaches, congestion, sore throat, arm or leg trouble, eye pain, ear pain, rash, or other associated symptoms. Patient has a PSHx of rectal surgery, appendectomy, cholecystectomy, hernia repair, and nasal septum repair. Patient endorses being a former smoker, but denies use of EtOH or recreational drugs. Allergic to corticosteroids, eggs, eggshell membrane, calcium channel blocking agent diltiazem analogues, cephalexin, and lorazepam.     The history is provided by the patient. No  was used.     Review of patient's allergies indicates: "   Allergen Reactions    Corticosteroids (glucocorticoids) Palpitations and Anaphylaxis    Eggs [egg derived] Anaphylaxis    Eggshell membrane Anaphylaxis    Calcium channel blocking agent diltiazem analogues Other (See Comments)    Cephalexin      Anaphylaxis^    Lorazepam Other (See Comments)     nausea  Heart RACING     Past Medical History:   Diagnosis Date    Anticoagulant long-term use     Anxiety     Atrial fibrillation     CHF (congestive heart failure)     Chronic back pain      Past Surgical History:   Procedure Laterality Date    APPENDECTOMY      CHOLECYSTECTOMY      HERNIA REPAIR      NASAL SEPTUM SURGERY      RECTAL SURGERY      TRANSESOPHAGEAL ECHOCARDIOGRAPHY N/A 2021    Procedure: ECHOCARDIOGRAM, TRANSESOPHAGEAL;  Surgeon: Mikey Nicole MD;  Location: [facility] EP LAB;  Service: Cardiology;  Laterality: N/A;    TREATMENT OF CARDIAC ARRHYTHMIA N/A 2021    Procedure: CARDIOVERSION;  Surgeon: Jasbir Evans MD;  Location: [facility] EP LAB;  Service: Cardiology;  Laterality: N/A;  AF, NADIYA/DCCV, DM, ANes, 905    TREATMENT OF CARDIAC ARRHYTHMIA N/A 2021    Procedure: Cardioversion or Defibrillation;  Surgeon: Jasbir Evans MD;  Location: [facility] EP LAB;  Service: Cardiology;  Laterality: N/A;  AF, NADIYA, DCCV, MAC, DM, 3 Prep     Family History   Problem Relation Age of Onset    COPD Mother     Dementia Father      Social History     Tobacco Use    Smoking status: Former Smoker     Packs/day: 0.50     Types: Cigarettes     Quit date: 2010     Years since quittin.4    Smokeless tobacco: Never Used    Tobacco comment: using nicotine patch   Substance Use Topics    Alcohol use: No    Drug use: No     Review of Systems   Constitutional: Negative for chills, diaphoresis and fever.   HENT: Negative for ear pain and sore throat.    Eyes: Negative for pain.   Respiratory: Negative for cough and shortness of breath.    Cardiovascular: Negative for chest pain.    Gastrointestinal: Negative for abdominal pain, diarrhea, nausea and vomiting.   Genitourinary: Negative for dysuria.   Musculoskeletal: Negative for back pain.        (-) Arm or leg trouble.    Skin: Negative for rash.   Neurological: Positive for dizziness. Negative for headaches.   Psychiatric/Behavioral: Negative for confusion.       Physical Exam     Initial Vitals [05/17/22 1106]   BP Pulse Resp Temp SpO2   139/86 (!) 116 18 99.1 °F (37.3 °C) 97 %      MAP       --         Physical Exam    Constitutional: He appears well-developed and well-nourished.   HENT:   Head: Normocephalic and atraumatic.   Eyes: EOM are normal. Pupils are equal, round, and reactive to light.   Neck: Neck supple. No JVD present.   Normal range of motion.  Cardiovascular: Normal heart sounds and intact distal pulses. An irregularly irregular rhythm present.  Tachycardia present.    Pulmonary/Chest: Breath sounds normal.   Abdominal: Abdomen is soft. Bowel sounds are normal.   Musculoskeletal:         General: Normal range of motion.      Cervical back: Normal range of motion and neck supple.      Right lower leg: No edema.      Left lower leg: No edema.     Lymphadenopathy:     He has no cervical adenopathy.   Neurological: He is alert and oriented to person, place, and time. He has normal strength and normal reflexes. GCS score is 15. GCS eye subscore is 4. GCS verbal subscore is 5. GCS motor subscore is 6.   Skin: Skin is warm and dry. Capillary refill takes less than 2 seconds.         ED Course   Procedures  Labs Reviewed   CBC W/ AUTO DIFFERENTIAL - Abnormal; Notable for the following components:       Result Value    Hemoglobin 18.5 (*)     Hematocrit 54.9 (*)     All other components within normal limits   COMPREHENSIVE METABOLIC PANEL - Abnormal; Notable for the following components:    Glucose 123 (*)     Total Bilirubin 1.5 (*)     All other components within normal limits   B-TYPE NATRIURETIC PEPTIDE - Abnormal; Notable for  the following components:     (*)     All other components within normal limits   POCT GLUCOSE - Abnormal; Notable for the following components:    POCT Glucose 124 (*)     All other components within normal limits   TROPONIN I     EKG Readings: (Independently Interpreted)   This is doctor Eddy dictating.  This patient is in atrial fibrillation with rapid ventricular response.  There is left axis deviation the patient has right bundle branch block.  There are nonspecific ST segment and T-wave changes.  There is no definite evidence of acute myocardial infarction or malignant arrhythmia.       Imaging Results          X-Ray Chest AP Portable (Final result)  Result time 05/17/22 12:13:41    Final result by Julio Quintana MD (05/17/22 12:13:41)                 Impression:      As above.      Electronically signed by: Julio Quintana  Date:    05/17/2022  Time:    12:13             Narrative:    EXAMINATION:  XR CHEST AP PORTABLE    CLINICAL HISTORY:  Unspecified atrial fibrillation    TECHNIQUE:  Single frontal view of the chest was performed.    COMPARISON:  Chest radiograph 09/06/2021    FINDINGS:  Monitoring leads and resuscitation pad.    Grossly unchanged cardiomediastinal contours.  Enlarged cardiac silhouette, as before.  Minor coarsened interstitium, relatively similar to prior study performed 09/06/2021.    No definite pneumothorax or large volume pleural effusion.    No acute findings identified visualized abdomen.    Osseous and soft tissue structures appear without definite acute abnormality                                Resident staff attestation:  This is doctor Eddy dictating.  I examined this patient at 11:40 a.m..  The patient is in atrial fibrillation with rapid ventricular response she has no chest pain or shortness of breath lungs are clear.  The patient has an irregularly irregular tachycardia.  I will start amiodarone and admit the patient to the ICU.  I will consult Cardiology.   I agree with the resident documentation diagnostic and treatment plan.       Medications   amiodarone 360 mg/200 mL (1.8 mg/mL) infusion (1 mg/min Intravenous Restarted 5/17/22 1212)   amiodarone 360 mg/200 mL (1.8 mg/mL) infusion (has no administration in time range)   amiodarone in dextrose 150 mg/100 mL (1.5 mg/mL) loading dose 150 mg (0 mg Intravenous Stopped 5/17/22 1210)   enoxaparin injection 100 mg (100 mg Subcutaneous Given 5/17/22 1258)     Medical Decision Making:   Initial Assessment:   Patient is a 62 YO male who presented to ED for mild dizziness. He is alert oriented in no acute distress. He is a febrile, tachycardic, with all other vitals within normal limits. Clear bilateral breath sounds. Clear bilateral breath sounds, no edema or JVD noted. Initial EKG in triage A fib with RVR. No focal neurological deficits. Normal gait   Differential Diagnosis:   A fib with RVR   Dehydration  Metabolic Derangement   Clinical Tests:   Lab Tests: Ordered and Reviewed  Radiological Study: Ordered and Reviewed  Medical Tests: Ordered and Reviewed  ED Management:  Patient Stable AFIB with RVR. Alert and oriented. Patient placed on pads. Chest X ray obtained to evaluated for any acute intrathoracic process. Patient Allergic with negative reaction to Calcium channel blockers. Initial plan to rate control with metoprolol, however premedication patient systolic 108. Patient cannot be given fluid as he is CHF with EF of 15% on last ECHO. Given BP, Patient started on Amiodarone. Patient given Bolus and started on drip. Patient given home dose of Eliquis as he has not been taking his anticoagulation regularly the past several days. Patient refused Oral medication, Given Im Lovenox.  Cardiology consulted and evaluated patient.  Upon reassment patient remained stable, with HR improving to 120-130. Discussed case with ICU doctor who will admit patient. Patient admitted to ICU. Discussed findings with patient who is Agreeable  with plan and admission.           Scribe Attestation:   Scribe #1: I performed the above scribed service and the documentation accurately describes the services I performed. I attest to the accuracy of the note.                 Clinical Impression:   Final diagnoses:  [R00.0] Tachycardia  [I48.91] A-fib          ED Disposition Condition    Admit                   Desmond Negro MD  Resident  05/17/22 8751

## 2022-05-17 NOTE — ED NOTES
/81 prior to metoprolol administration, relayed to MD Eddy. Per prior instruction Rn to hold if systolic below 110. RN holding and MD aware.

## 2022-05-17 NOTE — ED TRIAGE NOTES
Presents with lightheadedness this am. Pt states has h/o A Fib. Pt noted to be very diaphoretic with some mild SOB. Denies any chest pain at this time.

## 2022-05-17 NOTE — PHARMACY MED REC
"Admission Medication History     The home medication history was taken by Katy Cowart CPhT.    You may go to "Admission" then "Reconcile Home Medications" tabs to review and/or act upon these items.      The home medication list has been updated by the Pharmacy department.    Please read ALL comments highlighted in yellow.    Please address this information as you see fit.     Feel free to contact us if you have any questions or require assistance.        Medications listed below were obtained from: Patient/family and Analytic software- [employer]  (Not in a hospital admission)      Potential issues to be addressed PRIOR TO DISCHARGE  Patient reported not taking the following medications: (amiodarone 200 mg; carvedilol 6.25 mg; furosemide 40 mg; miconazole nitrate 2%; potassium chloride 10 meq; sacubitril-valsartan 24-26 tab; tramadol). These medications remain on the home medication list. Please address accordingly.         Katy Cowart CPhT.  [phone]                    .          "

## 2022-05-17 NOTE — ASSESSMENT & PLAN NOTE
Long Hx PAF and medical noncompliance. HR improving but still with A-fib on IV amiodarone and eliquis. NADIYA/CV tomorrow if A-fib persists. Repeat echo - Hx CM EF 20%. Out patient f/u with Dr Evans

## 2022-05-17 NOTE — CARE UPDATE
[facility]  ICU Shift Summary  Date: 5/17/2022      Prehospitalization: Home  Admit Date / LOS : 5/17/2022/ 0 days    Diagnosis: Paroxysmal atrial fibrillation with RVR    Consults:        Active: Cardio       Needed: N/A     Code Status: Full Code   Advanced Directive: <no information>    LDA:  Lines/Drains/Airways     Peripheral Intravenous Line  Duration                Peripheral IV - Single Lumen 05/17/22 1129 20 G Left Antecubital <1 day         Peripheral IV - Single Lumen 05/17/22 1130 20 G Right Antecubital <1 day              Central Lines/Site/Justification:Patient Does Not Have Central Line  Urinary Cath/Order/Justification:Patient Does Not Have Urinary Catheter    Vasopressors/Infusions:    amiodarone in dextrose 5% 1 mg/min (05/17/22 1212)    amiodarone in dextrose 5%            GOALS: Volume/ Hemodynamic: HR <                     RASS: 0  alert and calm    Pain Management: none       Pain Controlled: yes     Rhythm: A-Fib    Respiratory Device: Room Air                  Most Recent SBT/ SAT: Did not perform       MOVE Screen: PASS  ICU Liberation: not applicable    VTE Prophylaxis: Pharm  Mobility: Bedrest  Stress Ulcer Prophylaxis: No    Isolation: No active isolations    Dietary:   Current Diet Order   Procedures    Diet NPO    Diet Cardiac      Tolerance: yes  Advancement: yes    I & O (24h):    Intake/Output Summary (Last 24 hours) at 5/17/2022 1743  Last data filed at 5/17/2022 1211  Gross per 24 hour   Intake 88.97 ml   Output --   Net 88.97 ml        Restraints: No    Significant Dates:  Post Op Date: N/A  Rescue Date: N/A  Imaging/ Diagnostics: N/A    Noteworthy Labs:  none    COVID Test: (--)  CBC/Anemia Labs: Coags:    Recent Labs   Lab 05/17/22  1135 05/17/22  1547   WBC 8.68 10.79   HGB 18.5* 16.4   HCT 54.9* 48.2    281   MCV 90 89   RDW 13.7 13.7    Recent Labs   Lab 05/17/22  1547   INR 1.0        Chemistries:   Recent Labs   Lab 05/17/22  1135 05/17/22  1547     140   K 4.2 4.5    105   CO2 27 27   BUN 15 18   CREATININE 1.2 1.4   CALCIUM 10.3 9.5   PROT 8.0  --    BILITOT 1.5*  --    ALKPHOS 57  --    ALT 28  --    AST 26  --         Cardiac Enzymes: Ejection Fractions:    Recent Labs     05/17/22  1135   TROPONINI 0.018    EF   Date Value Ref Range Status   05/17/2022 20 % Final        POCT Glucose: HbA1c:    Recent Labs   Lab 05/17/22  1109   POCTGLUCOSE 124*    Hemoglobin A1C   Date Value Ref Range Status   09/07/2021 5.8 (H) 4.0 - 5.6 % Final     Comment:     ADA Screening Guidelines:  5.7-6.4%  Consistent with prediabetes  >or=6.5%  Consistent with diabetes    High levels of fetal hemoglobin interfere with the HbA1C  assay. Heterozygous hemoglobin variants (HbS, HgC, etc)do  not significantly interfere with this assay.   However, presence of multiple variants may affect accuracy.             ICU LOS 2h  Level of Care: Critical Care    Chart Check: 12 HR Done  Shift Summary/Plan for the shift:     Pt remains in ICU on amiodarone drip at 16.7. in a-fib w/ RVR. No new falls, injuries or skin breakdown. Precautions maintained for all.

## 2022-05-17 NOTE — SUBJECTIVE & OBJECTIVE
Past Medical History:   Diagnosis Date    Anticoagulant long-term use     Anxiety     Atrial fibrillation     CHF (congestive heart failure)     Chronic back pain        Past Surgical History:   Procedure Laterality Date    APPENDECTOMY      CHOLECYSTECTOMY      HERNIA REPAIR      NASAL SEPTUM SURGERY      RECTAL SURGERY      TRANSESOPHAGEAL ECHOCARDIOGRAPHY N/A 8/26/2021    Procedure: ECHOCARDIOGRAM, TRANSESOPHAGEAL;  Surgeon: Mikey Nicole MD;  Location: [facility] EP LAB;  Service: Cardiology;  Laterality: N/A;    TREATMENT OF CARDIAC ARRHYTHMIA N/A 8/26/2021    Procedure: CARDIOVERSION;  Surgeon: Jasbir Evans MD;  Location: [facility] EP [facility];  Service: Cardiology;  Laterality: N/A;  AF, NADIYA/DCCV, DM, ANes, 905    TREATMENT OF CARDIAC ARRHYTHMIA N/A 9/22/2021    Procedure: Cardioversion or Defibrillation;  Surgeon: Jasbir Evans MD;  Location: [facility] EP LAB;  Service: Cardiology;  Laterality: N/A;  AF, NADIYA, DCCV, MAC, DM, 3 Prep       Review of patient's allergies indicates:   Allergen Reactions    Corticosteroids (glucocorticoids) Palpitations and Anaphylaxis    Eggs [egg derived] Anaphylaxis    Eggshell membrane Anaphylaxis    Calcium channel blocking agent diltiazem analogues Other (See Comments)    Cephalexin      Anaphylaxis^    Lorazepam Other (See Comments)     nausea  Heart RACING       No current facility-administered medications on file prior to encounter.     Current Outpatient Medications on File Prior to Encounter   Medication Sig    amiodarone (PACERONE) 200 MG Tab Take 1 tablet (200 mg total) by mouth once daily.    apixaban (ELIQUIS) 5 mg Tab Take 1 tablet (5 mg total) by mouth 2 (two) times daily.    carvediloL (COREG) 6.25 MG tablet Take 1 tablet (6.25 mg total) by mouth 2 (two) times daily.    furosemide (LASIX) 40 MG tablet Take 1 tablet (40 mg total) by mouth once daily.    miconazole NITRATE 2 % (MICOTIN) 2 % top powder Apply topically 2 (two) times a day. Apply to groin area for 4 weeks.     nicotine (NICODERM CQ) 14 mg/24 hr Place 1 patch onto the skin once daily.    potassium chloride (KLOR-CON) 10 MEQ TbSR Take 1 tablet (10 mEq total) by mouth once daily.    sacubitriL-valsartan (ENTRESTO) 24-26 mg per tablet Take 1 tablet by mouth 2 (two) times daily.    traMADol (ULTRAM) 50 mg tablet Take 1 tablet by mouth every 4 to 6 hours as needed.     Family History       Problem Relation (Age of Onset)    COPD Mother    Dementia Father          Tobacco Use    Smoking status: Former Smoker     Packs/day: 0.50     Types: Cigarettes     Quit date: 2010     Years since quittin.4    Smokeless tobacco: Never Used    Tobacco comment: using nicotine patch   Substance and Sexual Activity    Alcohol use: No    Drug use: No    Sexual activity: Never     Review of Systems   Constitutional: Negative for decreased appetite.   HENT:  Negative for ear discharge.    Eyes:  Negative for blurred vision.   Endocrine: Negative for polyphagia.   Skin:  Negative for nail changes.   Genitourinary:  Negative for bladder incontinence.   Neurological:  Negative for aphonia.   Psychiatric/Behavioral:  Negative for hallucinations.    Allergic/Immunologic: Negative for hives.   Objective:     Vital Signs (Most Recent):  Temp: 99.1 °F (37.3 °C) (22 1106)  Pulse: 94 (22 1207)  Resp: 17 (22 1207)  BP: 109/78 (22 1207)  SpO2: 95 % (22 1200) Vital Signs (24h Range):  Temp:  [99.1 °F (37.3 °C)] 99.1 °F (37.3 °C)  Pulse:  [] 94  Resp:  [16-18] 17  SpO2:  [95 %-97 %] 95 %  BP: (107-139)/(78-86) 109/78     Weight: 97.1 kg (214 lb)  Body mass index is 34.54 kg/m².    SpO2: 95 %  O2 Device (Oxygen Therapy): room air      Intake/Output Summary (Last 24 hours) at 2022 1228  Last data filed at 2022 1211  Gross per 24 hour   Intake 88.97 ml   Output --   Net 88.97 ml       Lines/Drains/Airways       Peripheral Intravenous Line  Duration                  Peripheral IV - Single Lumen 22 1129 20  G Left Antecubital <1 day         Peripheral IV - Single Lumen 05/17/22 1130 20 G Right Antecubital <1 day                    Physical Exam  Constitutional:       Appearance: He is well-developed.   HENT:      Head: Normocephalic and atraumatic.   Eyes:      Conjunctiva/sclera: Conjunctivae normal.      Pupils: Pupils are equal, round, and reactive to light.   Cardiovascular:      Rate and Rhythm: Tachycardia present. Rhythm irregular.      Pulses: Intact distal pulses.      Heart sounds: Normal heart sounds.   Pulmonary:      Effort: Pulmonary effort is normal.      Breath sounds: Normal breath sounds.   Abdominal:      General: Bowel sounds are normal.      Palpations: Abdomen is soft.   Musculoskeletal:         General: Normal range of motion.      Cervical back: Normal range of motion and neck supple.      Right lower leg: Edema present.      Left lower leg: Edema present.   Skin:     General: Skin is warm and dry.   Neurological:      Mental Status: He is alert and oriented to person, place, and time.       Significant Labs: All pertinent lab results from the last 24 hours have been reviewed.    Significant Imaging: Echocardiogram: 2D echo with color flow doppler:   Results for orders placed or performed during the hospital encounter of 04/08/17   2D echo with color flow doppler   Result Value Ref Range    EF + QEF 55 55 - 65    Mitral Valve Regurgitation TRIVIAL     Diastolic Dysfunction No     Est. PA Systolic Pressure 28.43     Pericardial Effusion NONE     Mitral Valve Mobility NORMAL     Tricuspid Valve Regurgitation TRIVIAL     Narrative    Date of Procedure: 04/09/2017        TEST DESCRIPTION   Technical Quality: This is a technically adequate study.     Aorta: The aortic root is mildly enlarged, measuring 2.8 cm at sinotubular junction and 3.8 cm at Sinuses of Valsalva. The proximal ascending aorta is normal in size, measuring 3.4 cm across.     Left Atrium: The left atrial volume index is normal,  measuring 26.81 cc/m2.     Left Ventricle: The left ventricle is normal in size, with an end-diastolic diameter of 5.0 cm, and an end-systolic diameter of 3.3 cm. LV wall thickness is normal, with the septum measuring 1.1 cm and the posterior wall measuring 1.0 cm across. Relative   wall thickness was normal at 0.40, and the LV mass index was 109.9 g/m2 consistent with normal left ventricular mass. Global left ventricular systolic function appears normal. Visually estimated ejection fraction is 55-60%. The LV Doppler derived stroke   volume equals 58.0 ccs.   The E/e'(lat) is 6, consistent with normal diastolic function.     Right Atrium: The right atrium is normal in size, measuring 4.7 cm in length and 3.8 cm in width in the apical view.     Right Ventricle: The right ventricle is normal in size measuring 3.6 cm at the base in the apical right ventricle-focused view. Global right ventricular systolic function appears normal. Tricuspid annular plane systolic excursion (TAPSE) is 2.4 cm.   Tissue Doppler-derived tricuspid annular peak systolic velocity (S prime) is 14.3 cm/s. The estimated PA systolic pressure is 28 mmHg.     Aortic Valve:  The aortic valve is normal in structure with normal leaflet mobility. The aortic valve is tri-leaflet in structure. The mean gradient obtained across the aortic valve is 2.45 mmHg. Using a left ventricular outflow tract diameter of 2.1 cm,   a left ventricular outflow tract velocity time integral of 17 cm, and a peak instantaneous transvalvular velocity time integral of 20 cm, the calculated aortic valve area is 2.87 cm2.     Mitral Valve:  The mitral valve is normal in structure with normal leaflet mobility. The pressure half time is 61 msec. The calculated mitral valve area is 3.61 cm2. There is trivial mitral regurgitation.     Tricuspid Valve:  The tricuspid valve is normal in structure with normal leaflet mobility. There is trivial tricuspid regurgitation.     Pulmonary  Valve:  The pulmonic valve is not well seen.     Pericardium: There is no evidence of pericardial effusion.      IVC: IVC is enlarged but collapses > 50% with a sniff, suggesting intermediate right atrial pressure of 8 mmHg.     Intracavitary: There is no evidence of intracavity mass, thrombi, or vegetation.         CONCLUSIONS     1 - Normal left ventricular systolic function (EF 55-60%).  Normal wall motion.     2 - Mildly enlarged aortic root, 3.8 cm..     3 - Trivial mitral regurgitation.     4 - Trivial tricuspid regurgitation.     5 - The estimated PA systolic pressure is 28 mmHg.             This document has been electronically    SIGNED BY: Ramon Rodríguez MD On: 04/09/2017 13:19

## 2022-05-17 NOTE — HPI
"Timmy Wade is a 61 y. o male with a PMHx of anticoagulant long-term us, A-fib, anxiety, CHF, and chronic back pain, that comes to the ED complaining of dizziness beginning this morning. The patient states he started experiencing dizziness this morning, stating he has experienced similar symptoms with past A-fib episodes. The patient states he walked to the hospital, and notes he didn't eat today. The patient is compliant with Eliquis, which he reports he stopped taking yesterday. Patient notes he was seen last week for A-fib, attesting his symptoms were worse then. Patient also notes he was given metoprolol once in the ED for his A-fib and he had "a bad reaction for it". No medications taken PTA. No alleviating or exacerbating factors noted. Patient denies cough, shortness of breath, chest pain, fever, chills, abdominal pain, nausea, vomiting, diarrhea, dysuria, headaches, congestion, sore throat, arm or leg trouble, eye pain, ear pain, rash, or other associated symptoms. Patient has a PSHx of rectal surgery, appendectomy, cholecystectomy, hernia repair, and nasal septum repair. Patient endorses being a former smoker, but denies use of EtOH or recreational drugs. Allergic to corticosteroids, eggs, eggshell membrane, calcium channel blocking agent diltiazem analogues, cephalexin, and lorazepam.     Currently A-fib 110-120 on IV amiodarone  Admits to medical noncompliance  EKG A-fib 159 IRBBB NSSTT changes    Troponin negative  Last CV 9/22/21    Echo 8/9/21  The left ventricle is mildly enlarged with eccentric hypertrophy and severely decreased systolic function.  The estimated ejection fraction is 20%.  Left ventricular diastolic dysfunction.  Moderate right ventricular enlargement with moderately reduced right ventricular systolic function.  Moderate left atrial enlargement.  Moderate right atrial enlargement.  Mild mitral regurgitation.  Mild to moderate tricuspid regurgitation.  Intermediate " central venous pressure (8 mmHg).  The estimated PA systolic pressure is 20 mmHg.      [place] [facility] [place]  PAF has usually happened about 1x/month, for past few years, lasting 4-8h per episode.  Had a long episode recently; went to [facility] ER. Rx amio, which was later stopped for hypotension (?). He left AMA, after having left AMA from another hospital previously.  Went to our ER 8/25 for AF with . Rate controlled, NADIYA/DCCV 8/28/21. Was back into AF with RVR by 9/6. Amio load started then.     Feeling well. Walks 2 miles w/o issues.     echo 8/21 20% LVEF

## 2022-05-17 NOTE — CARE UPDATE
[facility] [place]  Nurses Note -- 4 Eyes      5/17/2022       Skin assessed on: Transfer      [x] No Pressure Injuries Present    [x]Prevention Measures Documented    [] Yes LDA Previously documented     [] Yes New Pressure Injury Discovered   [] LDA Added      Attending RN:  Keri Gasca, RN     Second RN:  Chaparrita Chambers

## 2022-05-17 NOTE — ASSESSMENT & PLAN NOTE
Patient was counseled on smoking cessation for 4 minutes. Patient smokes 1ppd. We discussed how smoking is detrimental to the patient's health. Prescription for nicotine patch was offered and accepted. Patient is ready to quit

## 2022-05-17 NOTE — CONSULTS
"[facility] - [place] - Emergency Dept  Cardiology  Consult Note    Patient Name: Timmy Wade  MRN: [id number]  Admission Date: 5/17/2022  Hospital Length of Stay: 0 days  Code Status: Full Code   Attending Provider: Vishal Eddy MD   Consulting Provider: Chris Vaughan MD  Primary Care Physician: Robert Bravo MD  Principal Problem:<principal problem not specified>    Patient information was obtained from patient and ER records.     Consults  Subjective:     Chief Complaint:  A-fib     HPI:   Timmy Wade is a 61 y. o male with a PMHx of anticoagulant long-term us, A-fib, anxiety, CHF, and chronic back pain, that comes to the ED complaining of dizziness beginning this morning. The patient states he started experiencing dizziness this morning, stating he has experienced similar symptoms with past A-fib episodes. The patient states he walked to the hospital, and notes he didn't eat today. The patient is compliant with Eliquis, which he reports he stopped taking yesterday. Patient notes he was seen last week for A-fib, attesting his symptoms were worse then. Patient also notes he was given metoprolol once in the ED for his A-fib and he had "a bad reaction for it". No medications taken PTA. No alleviating or exacerbating factors noted. Patient denies cough, shortness of breath, chest pain, fever, chills, abdominal pain, nausea, vomiting, diarrhea, dysuria, headaches, congestion, sore throat, arm or leg trouble, eye pain, ear pain, rash, or other associated symptoms. Patient has a PSHx of rectal surgery, appendectomy, cholecystectomy, hernia repair, and nasal septum repair. Patient endorses being a former smoker, but denies use of EtOH or recreational drugs. Allergic to corticosteroids, eggs, eggshell membrane, calcium channel blocking agent diltiazem analogues, cephalexin, and lorazepam.     Currently A-fib 110-120 on IV amiodarone  Admits to medical noncompliance  EKG A-fib 159 IRBBB NSSTT changes  BNP " 354  Troponin negative  Last CV 9/22/21    Echo 8/9/21  · The left ventricle is mildly enlarged with eccentric hypertrophy and severely decreased systolic function.  · The estimated ejection fraction is 20%.  · Left ventricular diastolic dysfunction.  · Moderate right ventricular enlargement with moderately reduced right ventricular systolic function.  · Moderate left atrial enlargement.  · Moderate right atrial enlargement.  · Mild mitral regurgitation.  · Mild to moderate tricuspid regurgitation.  · Intermediate central venous pressure (8 mmHg).  · The estimated PA systolic pressure is 20 mmHg.      Followed by Dr Evans at [facility] [place] [place] and Dr Tyler Ramos  PAF has usually happened about 1x/month, for past few years, lasting 4-8h per episode.  Had a long episode recently; went to [facility] ER. Rx amio, which was later stopped for hypotension (?). He left AMA, after having left AMA from another hospital previously.  Went to our ER 8/25 for AF with . Rate controlled, NADIYA/DCCV 8/28/21. Was back into AF with RVR by 9/6. Amio load started then.     Feeling well. Walks 2 miles w/o issues.     echo 8/21 20% LVEF      Past Medical History:   Diagnosis Date    Anticoagulant long-term use     Anxiety     Atrial fibrillation     CHF (congestive heart failure)     Chronic back pain        Past Surgical History:   Procedure Laterality Date    APPENDECTOMY      CHOLECYSTECTOMY      HERNIA REPAIR      NASAL SEPTUM SURGERY      RECTAL SURGERY      TRANSESOPHAGEAL ECHOCARDIOGRAPHY N/A 8/26/2021    Procedure: ECHOCARDIOGRAM, TRANSESOPHAGEAL;  Surgeon: Mikey Nicole MD;  Location: [facility] EP LAB;  Service: Cardiology;  Laterality: N/A;    TREATMENT OF CARDIAC ARRHYTHMIA N/A 8/26/2021    Procedure: CARDIOVERSION;  Surgeon: Jasbir Evans MD;  Location: [facility] EP LAB;  Service: Cardiology;  Laterality: N/A;  AF, NADIYA/DCCV, DM, ANes, 905    TREATMENT OF CARDIAC ARRHYTHMIA N/A 9/22/2021    Procedure: Cardioversion or Defibrillation;   Surgeon: Jasbir Evans MD;  Location: [facility] EP LAB;  Service: Cardiology;  Laterality: N/A;  AF, NADIYA, DCCV, MAC, DM, 3 Prep       Review of patient's allergies indicates:   Allergen Reactions    Corticosteroids (glucocorticoids) Palpitations and Anaphylaxis    Eggs [egg derived] Anaphylaxis    Eggshell membrane Anaphylaxis    Calcium channel blocking agent diltiazem analogues Other (See Comments)    Cephalexin      Anaphylaxis^    Lorazepam Other (See Comments)     nausea  Heart RACING       No current facility-administered medications on file prior to encounter.     Current Outpatient Medications on File Prior to Encounter   Medication Sig    amiodarone (PACERONE) 200 MG Tab Take 1 tablet (200 mg total) by mouth once daily.    apixaban (ELIQUIS) 5 mg Tab Take 1 tablet (5 mg total) by mouth 2 (two) times daily.    carvediloL (COREG) 6.25 MG tablet Take 1 tablet (6.25 mg total) by mouth 2 (two) times daily.    furosemide (LASIX) 40 MG tablet Take 1 tablet (40 mg total) by mouth once daily.    miconazole NITRATE 2 % (MICOTIN) 2 % top powder Apply topically 2 (two) times a day. Apply to groin area for 4 weeks.    nicotine (NICODERM CQ) 14 mg/24 hr Place 1 patch onto the skin once daily.    potassium chloride (KLOR-CON) 10 MEQ TbSR Take 1 tablet (10 mEq total) by mouth once daily.    sacubitriL-valsartan (ENTRESTO) 24-26 mg per tablet Take 1 tablet by mouth 2 (two) times daily.    traMADol (ULTRAM) 50 mg tablet Take 1 tablet by mouth every 4 to 6 hours as needed.     Family History       Problem Relation (Age of Onset)    COPD Mother    Dementia Father          Tobacco Use    Smoking status: Former Smoker     Packs/day: 0.50     Types: Cigarettes     Quit date: 2010     Years since quittin.4    Smokeless tobacco: Never Used    Tobacco comment: using nicotine patch   Substance and Sexual Activity    Alcohol use: No    Drug use: No    Sexual activity: Never     Review of Systems    Constitutional: Negative for decreased appetite.   HENT:  Negative for ear discharge.    Eyes:  Negative for blurred vision.   Endocrine: Negative for polyphagia.   Skin:  Negative for nail changes.   Genitourinary:  Negative for bladder incontinence.   Neurological:  Negative for aphonia.   Psychiatric/Behavioral:  Negative for hallucinations.    Allergic/Immunologic: Negative for hives.   Objective:     Vital Signs (Most Recent):  Temp: 99.1 °F (37.3 °C) (05/17/22 1106)  Pulse: 94 (05/17/22 1207)  Resp: 17 (05/17/22 1207)  BP: 109/78 (05/17/22 1207)  SpO2: 95 % (05/17/22 1200) Vital Signs (24h Range):  Temp:  [99.1 °F (37.3 °C)] 99.1 °F (37.3 °C)  Pulse:  [] 94  Resp:  [16-18] 17  SpO2:  [95 %-97 %] 95 %  BP: (107-139)/(78-86) 109/78     Weight: 97.1 kg (214 lb)  Body mass index is 34.54 kg/m².    SpO2: 95 %  O2 Device (Oxygen Therapy): room air      Intake/Output Summary (Last 24 hours) at 5/17/2022 1228  Last data filed at 5/17/2022 1211  Gross per 24 hour   Intake 88.97 ml   Output --   Net 88.97 ml       Lines/Drains/Airways       Peripheral Intravenous Line  Duration                  Peripheral IV - Single Lumen 05/17/22 1129 20 G Left Antecubital <1 day         Peripheral IV - Single Lumen 05/17/22 1130 20 G Right Antecubital <1 day                    Physical Exam  Constitutional:       Appearance: He is well-developed.   HENT:      Head: Normocephalic and atraumatic.   Eyes:      Conjunctiva/sclera: Conjunctivae normal.      Pupils: Pupils are equal, round, and reactive to light.   Cardiovascular:      Rate and Rhythm: Tachycardia present. Rhythm irregular.      Pulses: Intact distal pulses.      Heart sounds: Normal heart sounds.   Pulmonary:      Effort: Pulmonary effort is normal.      Breath sounds: Normal breath sounds.   Abdominal:      General: Bowel sounds are normal.      Palpations: Abdomen is soft.   Musculoskeletal:         General: Normal range of motion.      Cervical back: Normal  range of motion and neck supple.      Right lower leg: Edema present.      Left lower leg: Edema present.   Skin:     General: Skin is warm and dry.   Neurological:      Mental Status: He is alert and oriented to person, place, and time.       Significant Labs: All pertinent lab results from the last 24 hours have been reviewed.    Significant Imaging: Echocardiogram: 2D echo with color flow doppler:   Results for orders placed or performed during the hospital encounter of 04/08/17   2D echo with color flow doppler   Result Value Ref Range    EF + QEF 55 55 - 65    Mitral Valve Regurgitation TRIVIAL     Diastolic Dysfunction No     Est. PA Systolic Pressure 28.43     Pericardial Effusion NONE     Mitral Valve Mobility NORMAL     Tricuspid Valve Regurgitation TRIVIAL     Narrative    Date of Procedure: 04/09/2017        TEST DESCRIPTION   Technical Quality: This is a technically adequate study.     Aorta: The aortic root is mildly enlarged, measuring 2.8 cm at sinotubular junction and 3.8 cm at Sinuses of Valsalva. The proximal ascending aorta is normal in size, measuring 3.4 cm across.     Left Atrium: The left atrial volume index is normal, measuring 26.81 cc/m2.     Left Ventricle: The left ventricle is normal in size, with an end-diastolic diameter of 5.0 cm, and an end-systolic diameter of 3.3 cm. LV wall thickness is normal, with the septum measuring 1.1 cm and the posterior wall measuring 1.0 cm across. Relative   wall thickness was normal at 0.40, and the LV mass index was 109.9 g/m2 consistent with normal left ventricular mass. Global left ventricular systolic function appears normal. Visually estimated ejection fraction is 55-60%. The LV Doppler derived stroke   volume equals 58.0 ccs.   The E/e'(lat) is 6, consistent with normal diastolic function.     Right Atrium: The right atrium is normal in size, measuring 4.7 cm in length and 3.8 cm in width in the apical view.     Right Ventricle: The right  ventricle is normal in size measuring 3.6 cm at the base in the apical right ventricle-focused view. Global right ventricular systolic function appears normal. Tricuspid annular plane systolic excursion (TAPSE) is 2.4 cm.   Tissue Doppler-derived tricuspid annular peak systolic velocity (S prime) is 14.3 cm/s. The estimated PA systolic pressure is 28 mmHg.     Aortic Valve:  The aortic valve is normal in structure with normal leaflet mobility. The aortic valve is tri-leaflet in structure. The mean gradient obtained across the aortic valve is 2.45 mmHg. Using a left ventricular outflow tract diameter of 2.1 cm,   a left ventricular outflow tract velocity time integral of 17 cm, and a peak instantaneous transvalvular velocity time integral of 20 cm, the calculated aortic valve area is 2.87 cm2.     Mitral Valve:  The mitral valve is normal in structure with normal leaflet mobility. The pressure half time is 61 msec. The calculated mitral valve area is 3.61 cm2. There is trivial mitral regurgitation.     Tricuspid Valve:  The tricuspid valve is normal in structure with normal leaflet mobility. There is trivial tricuspid regurgitation.     Pulmonary Valve:  The pulmonic valve is not well seen.     Pericardium: There is no evidence of pericardial effusion.      IVC: IVC is enlarged but collapses > 50% with a sniff, suggesting intermediate right atrial pressure of 8 mmHg.     Intracavitary: There is no evidence of intracavity mass, thrombi, or vegetation.         CONCLUSIONS     1 - Normal left ventricular systolic function (EF 55-60%).  Normal wall motion.     2 - Mildly enlarged aortic root, 3.8 cm..     3 - Trivial mitral regurgitation.     4 - Trivial tricuspid regurgitation.     5 - The estimated PA systolic pressure is 28 mmHg.             This document has been electronically    SIGNED BY: Ramon Rodríguez MD On: 04/09/2017 13:19     Assessment and Plan:     Tobacco use disorder  counseled    Acute combined  systolic and diastolic congestive heart failure  Hx CM - ? Tachy-myopathy. Repeat echo. Volume status fairly stable     Echo    Interpretation Summary  · The left ventricle is mildly enlarged with eccentric hypertrophy and severely decreased systolic function.  · The estimated ejection fraction is 20%.  · Left ventricular diastolic dysfunction.  · Moderate right ventricular enlargement with moderately reduced right ventricular systolic function.  · Moderate left atrial enlargement.  · Moderate right atrial enlargement.  · Mild mitral regurgitation.  · Mild to moderate tricuspid regurgitation.  · Intermediate central venous pressure (8 mmHg).  · The estimated PA systolic pressure is 20 mmHg.    Recent Labs   Lab 05/17/22  1135   *   .      Persistent atrial fibrillation  Long Hx PAF and medical noncompliance. HR improving but still with A-fib on IV amiodarone and eliquis. NADIYA/CV tomorrow if A-fib persists. Repeat echo - Hx CM EF 20%. Out patient f/u with Dr Evans        VTE Risk Mitigation (From admission, onward)         Ordered     enoxaparin injection 100 mg  Once         05/17/22 1216              35 minutes spent with patient in ICU    Thank you for your consult. I will follow-up with patient. Please contact us if you have any additional questions.    Chris Vaughan MD  Cardiology   [facility] - [place] - Emergency Dept

## 2022-05-17 NOTE — LETTER
May 20, 2022    Timmy Wade  [address] [place] [id number]                [address] [place] [address]  Phone: [phone] Dear Mr. Wade:    Your hospital followup appointment with your primary care provider (Dr. Bravo)  has been scheduled for June 7, 2022 at 10:45 a.m.   On this appointment, you will see Nurse Practitioner, Jose C Ness, at [facility] [place].  This is only a one time visit with the nurse practitioner for your hospital followup.     Follow-up Information     Robert Bravo MD Follow up on 6/7/2022.    Specialty: Internal Medicine  Why: 10:45 a.m. on June 7, 2022 see Nurse Practitioner, Jose C Ness @ [address] [place] [address] [place] .  (This is a one-time hospital followup visit with one of Dr. Bravo's associates.)  Contact information:  [address] [place] [address] [address]  [place] [address] [phone]                       If you have any questions or concerns, please don't hesitate to call.    Sincerely,      Malu   II  [phone]

## 2022-05-17 NOTE — ASSESSMENT & PLAN NOTE
Hx CM - ? Tachy-myopathy. Repeat echo. Volume status fairly stable     Echo    Interpretation Summary  · The left ventricle is mildly enlarged with eccentric hypertrophy and severely decreased systolic function.  · The estimated ejection fraction is 20%.  · Left ventricular diastolic dysfunction.  · Moderate right ventricular enlargement with moderately reduced right ventricular systolic function.  · Moderate left atrial enlargement.  · Moderate right atrial enlargement.  · Mild mitral regurgitation.  · Mild to moderate tricuspid regurgitation.  · Intermediate central venous pressure (8 mmHg).  · The estimated PA systolic pressure is 20 mmHg.    Recent Labs   Lab 05/17/22  1135   *   .

## 2022-05-17 NOTE — H&P
[facility] [place] [facility] Medicine  History & Physical    Patient Name: Timmy Wade  MRN: [id number]  Patient Class: IP- Inpatient  Admission Date: 5/17/2022  Attending Physician: Josey Amezquita MD   Primary Care Provider: Robert Bravo MD         Patient information was obtained from patient, past medical records and ER records.     Subjective:     Principal Problem:Paroxysmal atrial fibrillation with RVR    Chief Complaint:   Chief Complaint   Patient presents with    Dizziness     Pt reporting light headedness that started this morning. Pt denies weakness/numbness. Pt reports he didn't eat today. Pt sweating in triage but reports he walked to hospital. Pt has a med hx of A-fib.         HPI: Mr Timmy Wade is a 61 y.o. man with paroxysmal Afib, chronic systolic/diastolic CHF who presents with lightheadedness.     He has history of CHF with exacerbation and Afib RVR requiring cardioversion in the past. He states he was seen in follow up and told that his heart failure had resolved. He is not currently taking any medications at home. He does smoke cigarettes. He denies chest pain, shortness of breath, palpitations, nausea, vomiting, lower extremity edema, abdominal distension.     In ED, he was found to have Afib RVR. Started amio gtt.       Past Medical History:   Diagnosis Date    Anticoagulant long-term use     Anxiety     Atrial fibrillation     CHF (congestive heart failure)     Chronic back pain        Past Surgical History:   Procedure Laterality Date    APPENDECTOMY      CHOLECYSTECTOMY      HERNIA REPAIR      NASAL SEPTUM SURGERY      RECTAL SURGERY      TRANSESOPHAGEAL ECHOCARDIOGRAPHY N/A 8/26/2021    Procedure: ECHOCARDIOGRAM, TRANSESOPHAGEAL;  Surgeon: Mikey Nicole MD;  [facility] LAB;  Service: Cardiology;  Laterality: N/A;    TREATMENT OF CARDIAC ARRHYTHMIA N/A 8/26/2021    Procedure: CARDIOVERSION;  Surgeon: Jasbir Evans MD;  Location: [facility]  LAB;  Service: Cardiology;  Laterality: N/A;  AF, NADIYA/DCCV, DM, ANes, 905    TREATMENT OF CARDIAC ARRHYTHMIA N/A 2021    Procedure: Cardioversion or Defibrillation;  Surgeon: Jasbir Evans MD;  Location: [facility] EP LAB;  Service: Cardiology;  Laterality: N/A;  AF, NADIYA, DCCV, MAC, DM, 3 Prep       Review of patient's allergies indicates:   Allergen Reactions    Corticosteroids (glucocorticoids) Palpitations and Anaphylaxis    Eggs [egg derived] Anaphylaxis    Eggshell membrane Anaphylaxis    Calcium channel blocking agent diltiazem analogues Other (See Comments)    Cephalexin      Anaphylaxis^    Lorazepam Other (See Comments)     nausea  Heart RACING     Not currently taking any medications.     Family History       Problem Relation (Age of Onset)    COPD Mother    Dementia Father          Tobacco Use    Smoking status: Former Smoker     Packs/day: 0.50     Types: Cigarettes     Quit date: 2010     Years since quittin.4    Smokeless tobacco: Never Used    Tobacco comment: using nicotine patch   Substance and Sexual Activity    Alcohol use: No    Drug use: No    Sexual activity: Never     Review of Systems   Constitutional:  Negative for chills and fever.   Respiratory:  Negative for cough, chest tightness, shortness of breath and wheezing.    Cardiovascular:  Negative for chest pain, palpitations and leg swelling.   Gastrointestinal:  Negative for abdominal distention, abdominal pain, constipation, diarrhea, nausea and vomiting.   Genitourinary:  Negative for difficulty urinating.   Musculoskeletal:  Negative for arthralgias and myalgias.   Skin:  Negative for rash.   Neurological:  Positive for light-headedness. Negative for weakness and numbness.   Psychiatric/Behavioral:  Negative for confusion.    Objective:     Vital Signs (Most Recent):  Temp: 99.1 °F (37.3 °C) (22 1106)  Pulse: 94 (22 1442)  Resp: 18 (22 1442)  BP: 126/68 (22 1442)  SpO2: (!) 93 % (22  1442)   Vital Signs (24h Range):  Temp:  [99.1 °F (37.3 °C)] 99.1 °F (37.3 °C)  Pulse:  [] 94  Resp:  [10-18] 18  SpO2:  [93 %-97 %] 93 %  BP: ()/(68-93) 126/68     Weight: 97.1 kg (214 lb)  Body mass index is 34.54 kg/m².    Physical Exam  Vitals and nursing note reviewed.   Constitutional:       General: He is not in acute distress.     Appearance: He is obese. He is not ill-appearing or toxic-appearing.   HENT:      Head: Normocephalic and atraumatic.      Nose: Nose normal.      Mouth/Throat:      Mouth: Mucous membranes are moist.   Eyes:      Conjunctiva/sclera: Conjunctivae normal.   Cardiovascular:      Rate and Rhythm: Tachycardia present. Rhythm irregular.      Pulses: Normal pulses.      Heart sounds: Normal heart sounds. No murmur heard.    No gallop.   Pulmonary:      Effort: Pulmonary effort is normal. No respiratory distress.      Breath sounds: Normal breath sounds. No wheezing or rales.      Comments: Room air  Abdominal:      General: Bowel sounds are normal. There is no distension.      Palpations: Abdomen is soft.      Tenderness: There is no abdominal tenderness. There is no guarding.   Musculoskeletal:      Right lower leg: No edema.      Left lower leg: No edema.   Skin:     General: Skin is warm and dry.   Neurological:      Mental Status: He is alert and oriented to person, place, and time.           Significant Labs: All pertinent labs within the past 24 hours have been reviewed.    Significant Imaging: I have reviewed all pertinent imaging results/findings within the past 24 hours.    Assessment/Plan:     * Paroxysmal atrial fibrillation with RVR  Patient with Paroxysmal (<7 days) atrial fibrillation which is uncontrolled currently. Patient is currently in atrial fibrillation with RVR.    Rate/rhythm control: amio gtt  Anticoagulation: lovenox  Cardiology consulted- NADIYA/DCCV in AM if doesn't convert. NPO at [place]    [id number] - VASc score:  Congestive Heart Failure or EF < 35% YES   +1   Hypertension NO   Age >= 75 NO   Diabetes Mellitus NO   Stroke/TIA prior history NO   Vascular disease (PAD, MI or Aortic Plaque)? NO   Age 64 - 74 NO   Female NO   Total 1         Tobacco use disorder  Patient was counseled on smoking cessation for 4 minutes. Patient smokes 1ppd. We discussed how smoking is detrimental to the patient's health. Prescription for nicotine patch was offered and accepted. Patient is ready to quit        Obesity (BMI 30-39.9)  Body mass index is 34.54 kg/m². Morbid obesity complicates all aspects of disease management from diagnostic modalities to treatment. Weight loss encouraged and health benefits explained to patient.         Chronic combined systolic and diastolic congestive heart failure  Suspect tachycardia induced cardiomyopathy.   No signs of acute exacerbation. Euvolemic on exam  BNP  Recent Labs   Lab 05/17/22  1135   *     Not currently on GDMT  Cardiology consulted         VTE Risk Mitigation (From admission, onward)         Ordered     enoxaparin injection 100 mg  Every 12 hours (non-standard times)         05/17/22 1512     IP VTE HIGH RISK PATIENT  Once         05/17/22 1443     Place MICHAEL hose  Until discontinued         05/17/22 1443     Place sequential compression device  Until discontinued         05/17/22 1443                 Critical care time spent on the evaluation and treatment of severe organ dysfunction, review of pertinent labs and imaging studies, discussions with consulting providers and discussions with patient/family: 30 minutes     Josey Amezquita MD  Department of Hospital Medicine   [facility] - [place] - Emergency Dept

## 2022-05-17 NOTE — SUBJECTIVE & OBJECTIVE
Past Medical History:   Diagnosis Date    Anticoagulant long-term use     Anxiety     Atrial fibrillation     CHF (congestive heart failure)     Chronic back pain        Past Surgical History:   Procedure Laterality Date    APPENDECTOMY      CHOLECYSTECTOMY      HERNIA REPAIR      NASAL SEPTUM SURGERY      RECTAL SURGERY      TRANSESOPHAGEAL ECHOCARDIOGRAPHY N/A 2021    Procedure: ECHOCARDIOGRAM, TRANSESOPHAGEAL;  Surgeon: Mikey Nicole MD;  Location: [facility] EP LAB;  Service: Cardiology;  Laterality: N/A;    TREATMENT OF CARDIAC ARRHYTHMIA N/A 2021    Procedure: CARDIOVERSION;  Surgeon: Jasbir Evans MD;  Location: [facility] EP LAB;  Service: Cardiology;  Laterality: N/A;  AF, NADIYA/DCCV, DM, ANes, 905    TREATMENT OF CARDIAC ARRHYTHMIA N/A 2021    Procedure: Cardioversion or Defibrillation;  Surgeon: Jasbir Evans MD;  Location: [facility] EP LAB;  Service: Cardiology;  Laterality: N/A;  AF, NADIYA, DCCV, MAC, DM, 3 Prep       Review of patient's allergies indicates:   Allergen Reactions    Corticosteroids (glucocorticoids) Palpitations and Anaphylaxis    Eggs [egg derived] Anaphylaxis    Eggshell membrane Anaphylaxis    Calcium channel blocking agent diltiazem analogues Other (See Comments)    Cephalexin      Anaphylaxis^    Lorazepam Other (See Comments)     nausea  Heart RACING     Not currently taking any medications.     Family History       Problem Relation (Age of Onset)    COPD Mother    Dementia Father          Tobacco Use    Smoking status: Former Smoker     Packs/day: 0.50     Types: Cigarettes     Quit date: 2010     Years since quittin.4    Smokeless tobacco: Never Used    Tobacco comment: using nicotine patch   Substance and Sexual Activity    Alcohol use: No    Drug use: No    Sexual activity: Never     Review of Systems   Constitutional:  Negative for chills and fever.   Respiratory:  Negative for cough, chest tightness, shortness of breath and wheezing.     Cardiovascular:  Negative for chest pain, palpitations and leg swelling.   Gastrointestinal:  Negative for abdominal distention, abdominal pain, constipation, diarrhea, nausea and vomiting.   Genitourinary:  Negative for difficulty urinating.   Musculoskeletal:  Negative for arthralgias and myalgias.   Skin:  Negative for rash.   Neurological:  Positive for light-headedness. Negative for weakness and numbness.   Psychiatric/Behavioral:  Negative for confusion.    Objective:     Vital Signs (Most Recent):  Temp: 99.1 °F (37.3 °C) (05/17/22 1106)  Pulse: 94 (05/17/22 1442)  Resp: 18 (05/17/22 1442)  BP: 126/68 (05/17/22 1442)  SpO2: (!) 93 % (05/17/22 1442)   Vital Signs (24h Range):  Temp:  [99.1 °F (37.3 °C)] 99.1 °F (37.3 °C)  Pulse:  [] 94  Resp:  [10-18] 18  SpO2:  [93 %-97 %] 93 %  BP: ()/(68-93) 126/68     Weight: 97.1 kg (214 lb)  Body mass index is 34.54 kg/m².    Physical Exam  Vitals and nursing note reviewed.   Constitutional:       General: He is not in acute distress.     Appearance: He is obese. He is not ill-appearing or toxic-appearing.   HENT:      Head: Normocephalic and atraumatic.      Nose: Nose normal.      Mouth/Throat:      Mouth: Mucous membranes are moist.   Eyes:      Conjunctiva/sclera: Conjunctivae normal.   Cardiovascular:      Rate and Rhythm: Tachycardia present. Rhythm irregular.      Pulses: Normal pulses.      Heart sounds: Normal heart sounds. No murmur heard.    No gallop.   Pulmonary:      Effort: Pulmonary effort is normal. No respiratory distress.      Breath sounds: Normal breath sounds. No wheezing or rales.      Comments: Room air  Abdominal:      General: Bowel sounds are normal. There is no distension.      Palpations: Abdomen is soft.      Tenderness: There is no abdominal tenderness. There is no guarding.   Musculoskeletal:      Right lower leg: No edema.      Left lower leg: No edema.   Skin:     General: Skin is warm and dry.   Neurological:      Mental  Status: He is alert and oriented to person, place, and time.           Significant Labs: All pertinent labs within the past 24 hours have been reviewed.    Significant Imaging: I have reviewed all pertinent imaging results/findings within the past 24 hours.

## 2022-05-17 NOTE — ASSESSMENT & PLAN NOTE
Suspect tachycardia induced cardiomyopathy.   No signs of acute exacerbation. Euvolemic on exam  BNP  Recent Labs   Lab 05/17/22  1135   *     Not currently on GDMT  Cardiology consulted

## 2022-05-17 NOTE — ASSESSMENT & PLAN NOTE
Patient with Paroxysmal (<7 days) atrial fibrillation which is uncontrolled currently. Patient is currently in atrial fibrillation with RVR.    Rate/rhythm control: amio gtt  Anticoagulation: lovenox  Cardiology consulted- NADIYA/DCCV in AM if doesn't convert. NPO at MN    [id number] - VASc score:  Congestive Heart Failure or EF < 35% YES  +1   Hypertension NO   Age >= 75 NO   Diabetes Mellitus NO   Stroke/TIA prior history NO   Vascular disease (PAD, MI or Aortic Plaque)? NO   Age 64 - 74 NO   Female NO   Total 1

## 2022-05-18 ENCOUNTER — ANESTHESIA (OUTPATIENT)
Dept: CARDIOLOGY | Facility: HOSPITAL | Age: 62
DRG: 309 | End: 2022-05-18
Payer: MEDICAID

## 2022-05-18 LAB
ANION GAP SERPL CALC-SCNC: 7 MMOL/L (ref 8–16)
BSA FOR ECHO PROCEDURE: 2.12 M2
BUN SERPL-MCNC: 21 MG/DL (ref 8–23)
CALCIUM SERPL-MCNC: 9.1 MG/DL (ref 8.7–10.5)
CHLORIDE SERPL-SCNC: 106 MMOL/L (ref 95–110)
CO2 SERPL-SCNC: 26 MMOL/L (ref 23–29)
CREAT SERPL-MCNC: 1 MG/DL (ref 0.5–1.4)
EJECTION FRACTION: 20 %
EST. GFR  (AFRICAN AMERICAN): >60 ML/MIN/1.73 M^2
EST. GFR  (NON AFRICAN AMERICAN): >60 ML/MIN/1.73 M^2
ESTIMATED AVG GLUCOSE: 117 MG/DL (ref 68–131)
GLUCOSE SERPL-MCNC: 95 MG/DL (ref 70–110)
HBA1C MFR BLD: 5.7 % (ref 4–5.6)
POCT GLUCOSE: 95 MG/DL (ref 70–110)
POCT GLUCOSE: 97 MG/DL (ref 70–110)
POTASSIUM SERPL-SCNC: 3.9 MMOL/L (ref 3.5–5.1)
SODIUM SERPL-SCNC: 139 MMOL/L (ref 136–145)

## 2022-05-18 PROCEDURE — 25000003 PHARM REV CODE 250: Performed by: NURSE ANESTHETIST, CERTIFIED REGISTERED

## 2022-05-18 PROCEDURE — 25000003 PHARM REV CODE 250: Performed by: INTERNAL MEDICINE

## 2022-05-18 PROCEDURE — 93010 EKG 12-LEAD: ICD-10-PCS | Mod: ,,, | Performed by: INTERNAL MEDICINE

## 2022-05-18 PROCEDURE — 80048 BASIC METABOLIC PNL TOTAL CA: CPT | Performed by: HOSPITALIST

## 2022-05-18 PROCEDURE — S4991 NICOTINE PATCH NONLEGEND: HCPCS | Performed by: HOSPITALIST

## 2022-05-18 PROCEDURE — 37000009 HC ANESTHESIA EA ADD 15 MINS: Performed by: INTERNAL MEDICINE

## 2022-05-18 PROCEDURE — 25000003 PHARM REV CODE 250: Performed by: HOSPITALIST

## 2022-05-18 PROCEDURE — A4216 STERILE WATER/SALINE, 10 ML: HCPCS | Performed by: HOSPITALIST

## 2022-05-18 PROCEDURE — D9220A PRA ANESTHESIA: Mod: ANES,,, | Performed by: ANESTHESIOLOGY

## 2022-05-18 PROCEDURE — 93005 ELECTROCARDIOGRAM TRACING: CPT

## 2022-05-18 PROCEDURE — D9220A PRA ANESTHESIA: ICD-10-PCS | Mod: CRNA,,, | Performed by: NURSE ANESTHETIST, CERTIFIED REGISTERED

## 2022-05-18 PROCEDURE — 36415 COLL VENOUS BLD VENIPUNCTURE: CPT | Performed by: HOSPITALIST

## 2022-05-18 PROCEDURE — D9220A PRA ANESTHESIA: ICD-10-PCS | Mod: ANES,,, | Performed by: ANESTHESIOLOGY

## 2022-05-18 PROCEDURE — 20000000 HC ICU ROOM

## 2022-05-18 PROCEDURE — 94761 N-INVAS EAR/PLS OXIMETRY MLT: CPT

## 2022-05-18 PROCEDURE — 63600175 PHARM REV CODE 636 W HCPCS: Performed by: NURSE ANESTHETIST, CERTIFIED REGISTERED

## 2022-05-18 PROCEDURE — 93010 ELECTROCARDIOGRAM REPORT: CPT | Mod: ,,, | Performed by: INTERNAL MEDICINE

## 2022-05-18 PROCEDURE — 63600175 PHARM REV CODE 636 W HCPCS: Performed by: HOSPITALIST

## 2022-05-18 PROCEDURE — 37000008 HC ANESTHESIA 1ST 15 MINUTES: Performed by: INTERNAL MEDICINE

## 2022-05-18 PROCEDURE — 63600175 PHARM REV CODE 636 W HCPCS

## 2022-05-18 PROCEDURE — D9220A PRA ANESTHESIA: Mod: CRNA,,, | Performed by: NURSE ANESTHETIST, CERTIFIED REGISTERED

## 2022-05-18 RX ORDER — FENTANYL CITRATE 50 UG/ML
INJECTION, SOLUTION INTRAMUSCULAR; INTRAVENOUS
Status: DISCONTINUED | OUTPATIENT
Start: 2022-05-18 | End: 2022-05-18

## 2022-05-18 RX ORDER — AMIODARONE HYDROCHLORIDE 200 MG/1
200 TABLET ORAL 2 TIMES DAILY
Status: DISCONTINUED | OUTPATIENT
Start: 2022-05-18 | End: 2022-05-19 | Stop reason: HOSPADM

## 2022-05-18 RX ORDER — ETOMIDATE 2 MG/ML
INJECTION INTRAVENOUS
Status: DISCONTINUED | OUTPATIENT
Start: 2022-05-18 | End: 2022-05-18

## 2022-05-18 RX ORDER — CARVEDILOL 6.25 MG/1
6.25 TABLET ORAL 2 TIMES DAILY
Status: DISCONTINUED | OUTPATIENT
Start: 2022-05-18 | End: 2022-05-19 | Stop reason: HOSPADM

## 2022-05-18 RX ORDER — LIDOCAINE HYDROCHLORIDE 20 MG/ML
INJECTION INTRAVENOUS
Status: DISCONTINUED | OUTPATIENT
Start: 2022-05-18 | End: 2022-05-18

## 2022-05-18 RX ORDER — KETAMINE HYDROCHLORIDE 100 MG/ML
INJECTION, SOLUTION INTRAMUSCULAR; INTRAVENOUS
Status: DISCONTINUED | OUTPATIENT
Start: 2022-05-18 | End: 2022-05-18

## 2022-05-18 RX ORDER — MIDAZOLAM HYDROCHLORIDE 1 MG/ML
INJECTION, SOLUTION INTRAMUSCULAR; INTRAVENOUS
Status: DISCONTINUED | OUTPATIENT
Start: 2022-05-18 | End: 2022-05-18

## 2022-05-18 RX ADMIN — FENTANYL CITRATE 50 MCG: 50 INJECTION, SOLUTION INTRAMUSCULAR; INTRAVENOUS at 02:05

## 2022-05-18 RX ADMIN — INSULIN ASPART 0 UNITS: 100 INJECTION, SOLUTION INTRAVENOUS; SUBCUTANEOUS at 08:05

## 2022-05-18 RX ADMIN — SENNOSIDES AND DOCUSATE SODIUM 1 TABLET: 50; 8.6 TABLET ORAL at 08:05

## 2022-05-18 RX ADMIN — MIDAZOLAM 2 MG: 1 INJECTION INTRAMUSCULAR; INTRAVENOUS at 02:05

## 2022-05-18 RX ADMIN — FENTANYL CITRATE 25 MCG: 50 INJECTION, SOLUTION INTRAMUSCULAR; INTRAVENOUS at 02:05

## 2022-05-18 RX ADMIN — BENZOCAINE, BUTAMBEN, AND TETRACAINE HYDROCHLORIDE 1 SPRAY: .028; .004; .004 AEROSOL, SPRAY TOPICAL at 02:05

## 2022-05-18 RX ADMIN — ESMOLOL HYDROCHLORIDE 10 MG: 10 INJECTION INTRAVENOUS at 02:05

## 2022-05-18 RX ADMIN — SACUBITRIL AND VALSARTAN 1 TABLET: 24; 26 TABLET, FILM COATED ORAL at 08:05

## 2022-05-18 RX ADMIN — ETOMIDATE 8 MG: 2 INJECTION, SOLUTION INTRAVENOUS at 02:05

## 2022-05-18 RX ADMIN — CARVEDILOL 6.25 MG: 6.25 TABLET, FILM COATED ORAL at 08:05

## 2022-05-18 RX ADMIN — ENOXAPARIN SODIUM 100 MG: 100 INJECTION SUBCUTANEOUS at 05:05

## 2022-05-18 RX ADMIN — Medication 10 ML: at 10:05

## 2022-05-18 RX ADMIN — APIXABAN 5 MG: 5 TABLET, FILM COATED ORAL at 08:05

## 2022-05-18 RX ADMIN — AMIODARONE HYDROCHLORIDE 200 MG: 200 TABLET ORAL at 08:05

## 2022-05-18 RX ADMIN — AMIODARONE HYDROCHLORIDE 200 MG: 200 TABLET ORAL at 03:05

## 2022-05-18 RX ADMIN — LIDOCAINE HYDROCHLORIDE 100 MG: 20 INJECTION, SOLUTION INTRAVENOUS at 02:05

## 2022-05-18 RX ADMIN — SODIUM CHLORIDE, SODIUM LACTATE, POTASSIUM CHLORIDE, AND CALCIUM CHLORIDE: .6; .31; .03; .02 INJECTION, SOLUTION INTRAVENOUS at 02:05

## 2022-05-18 RX ADMIN — AMIODARONE HYDROCHLORIDE 0.5 MG/MIN: 1.8 INJECTION, SOLUTION INTRAVENOUS at 05:05

## 2022-05-18 RX ADMIN — Medication 1 PATCH: at 08:05

## 2022-05-18 RX ADMIN — KETAMINE HYDROCHLORIDE 20 MG: 100 INJECTION, SOLUTION, CONCENTRATE INTRAMUSCULAR; INTRAVENOUS at 02:05

## 2022-05-18 RX ADMIN — Medication 10 ML: at 05:05

## 2022-05-18 NOTE — ANESTHESIA PREPROCEDURE EVALUATION
05/18/2022  Timmy Wade is a 61 y.o., male.  Pre-operative evaluation for Procedure(s) (LRB):  Transesophageal echo (NADIYA) intra-procedure log documentation (N/A)    NPO >8  METS >4    Vitals:    05/18/22 0815 05/18/22 0830 05/18/22 0845 05/18/22 0900   BP: (!) 132/101 (!) 129/101 (!) 116/91 (!) 124/90   BP Location:       Patient Position:       Pulse: 103 101 109 107   Resp: 19 16 16 20   Temp:       TempSrc:       SpO2: (!) 93% (!) 92% (!) 93% 95%   Weight:       Height:           Patient Active Problem List   Diagnosis    Paroxysmal atrial fibrillation with RVR    Chronic combined systolic and diastolic congestive heart failure    Obesity (BMI 30-39.9)    Tobacco use disorder    Anxiety    Prediabetes       Review of patient's allergies indicates:   Allergen Reactions    Corticosteroids (glucocorticoids) Palpitations and Anaphylaxis    Eggs [egg derived] Anaphylaxis    Eggshell membrane Anaphylaxis    Calcium channel blocking agent diltiazem analogues Other (See Comments)    Cephalexin      Anaphylaxis^    Lorazepam Other (See Comments)     nausea  Heart RACING       No current facility-administered medications on file prior to encounter.     Current Outpatient Medications on File Prior to Encounter   Medication Sig Dispense Refill    apixaban (ELIQUIS) 5 mg Tab Take 1 tablet (5 mg total) by mouth 2 (two) times daily. 60 tablet 11    nicotine (NICODERM CQ) 14 mg/24 hr Place 1 patch onto the skin once daily. 30 patch 3    amiodarone (PACERONE) 200 MG Tab Take 1 tablet (200 mg total) by mouth once daily. (Patient not taking: Reported on 5/17/2022) 90 tablet 1    carvediloL (COREG) 6.25 MG tablet Take 1 tablet (6.25 mg total) by mouth 2 (two) times daily. (Patient not taking: Reported on 5/17/2022) 60 tablet 5    furosemide (LASIX) 40 MG tablet Take 1 tablet (40 mg total) by mouth  once daily. (Patient not taking: Reported on 2022) 90 tablet 3    miconazole NITRATE 2 % (MICOTIN) 2 % top powder Apply topically 2 (two) times a day. Apply to groin area for 4 weeks. (Patient not taking: Reported on 2022) 71 g 1    potassium chloride (KLOR-CON) 10 MEQ TbSR Take 1 tablet (10 mEq total) by mouth once daily. (Patient not taking: Reported on 2022) 30 tablet 2    sacubitriL-valsartan (ENTRESTO) 24-26 mg per tablet Take 1 tablet by mouth 2 (two) times daily. (Patient not taking: Reported on 2022) 60 tablet 11    traMADol (ULTRAM) 50 mg tablet Take 1 tablet by mouth every 4 to 6 hours as needed.  0       Past Surgical History:   Procedure Laterality Date    APPENDECTOMY      CHOLECYSTECTOMY      HERNIA REPAIR      NASAL SEPTUM SURGERY      RECTAL SURGERY      TRANSESOPHAGEAL ECHOCARDIOGRAPHY N/A 2021    Procedure: ECHOCARDIOGRAM, TRANSESOPHAGEAL;  Surgeon: Mikey Nicole MD;  Location: [facility] EP LAB;  Service: Cardiology;  Laterality: N/A;    TREATMENT OF CARDIAC ARRHYTHMIA N/A 2021    Procedure: CARDIOVERSION;  Surgeon: Jasbir Evans MD;  Location: [facility] EP LAB;  Service: Cardiology;  Laterality: N/A;  AF, NADIYA/DCCV, DM, ANes, 905    TREATMENT OF CARDIAC ARRHYTHMIA N/A 2021    Procedure: Cardioversion or Defibrillation;  Surgeon: Jasbir Evans MD;  Location: [facility] EP LAB;  Service: Cardiology;  Laterality: N/A;  AF, NADIYA, DCCV, MAC, DM, 3 Prep       Social History     Socioeconomic History    Marital status: Unknown   Tobacco Use    Smoking status: Former Smoker     Packs/day: 0.50     Types: Cigarettes     Quit date: 2010     Years since quittin.4    Smokeless tobacco: Never Used    Tobacco comment: using nicotine patch   Substance and Sexual Activity    Alcohol use: No    Drug use: No    Sexual activity: Never   Social History Narrative    ** Merged History Encounter **              CBC:   Recent Labs     22  1135 22  9803    WBC 8.68 10.79   RBC 6.09 5.40   HGB 18.5* 16.4   HCT 54.9* 48.2    281   MCV 90 89   MCH 30.4 30.4   MCHC 33.7 34.0       CMP:   Recent Labs     22  1135 22  1547 22  0246    140 139   K 4.2 4.5 3.9    105 106   CO2 27 27 26   BUN 15 18 21   CREATININE 1.2 1.4 1.0   * 93 95   CALCIUM 10.3 9.5 9.1   ALBUMIN 4.5  --   --    PROT 8.0  --   --    ALKPHOS 57  --   --    ALT 28  --   --    AST 26  --   --    BILITOT 1.5*  --   --        INR  Recent Labs     22  1547   INR 1.0           Diagnostic Studies:      EKD Echo:      · The left ventricle is mildly enlarged with concentric hypertrophy and severely decreased systolic function.  · The estimated ejection fraction is 20%.  · Left ventricular diastolic dysfunction.  · Normal right ventricular size with normal right ventricular systolic function.  · Mild left atrial enlargement.  · Mild right atrial enlargement.  · Mild tricuspid regurgitation.  · Intermediate central venous pressure (8 mmHg).  · The estimated PA systolic pressure is 26 mmHg      Results for orders placed or performed during the hospital encounter of 17   2D echo with color flow doppler   Result Value Ref Range    EF + QEF 55 55 - 65    Mitral Valve Regurgitation TRIVIAL     Diastolic Dysfunction No     Est. PA Systolic Pressure 28.43     Pericardial Effusion NONE     Mitral Valve Mobility NORMAL     Tricuspid Valve Regurgitation TRIVIAL          Pre-op Assessment    I have reviewed the Patient Summary Reports.     I have reviewed the Nursing Notes. I have reviewed the NPO Status.   I have reviewed the Medications.     Review of Systems  Anesthesia Hx:  No problems with previous Anesthesia  History of prior surgery of interest to airway management or planning:  Denies Personal Hx of Anesthesia complications.   Social:  Former Smoker    Hematology/Oncology:        Hematology Comments: On lovenox   Cardiovascular:   Exercise tolerance:  good Denies Pacemaker.  Denies Valvular problems/Murmurs.  Denies CABG/stent. Dysrhythmias atrial fibrillation CHF ECG has been reviewed. Echo 5/17/2022  · The left ventricle is mildly enlarged with concentric hypertrophy and severely decreased systolic function.  · The estimated ejection fraction is 20%.  · Left ventricular diastolic dysfunction.  · Normal right ventricular size with normal right ventricular systolic function.  · Mild left atrial enlargement.  · Mild right atrial enlargement.  · Mild tricuspid regurgitation.  · Intermediate central venous pressure (8 mmHg).  · The estimated PA systolic pressure is 26 mmHg.     Pulmonary:  Pulmonary Normal    Renal/:  Renal/ Normal     Hepatic/GI:  Hepatic/GI Normal    Neurological:  Neurology Normal    Endocrine:   Denies Diabetes. Denies Hypothyroidism. Denies Hyperthyroidism.  Obesity / BMI > 30      Physical Exam  General: Cooperative, Alert and Oriented    Airway:  Mallampati: II   Mouth Opening: Normal  TM Distance: > 6 cm  Tongue: Normal  Neck ROM: Normal ROM    Dental:  Intact        Anesthesia Plan  Type of Anesthesia, risks & benefits discussed:    Anesthesia Type: Gen Natural Airway, MAC  Intra-op Monitoring Plan: Standard ASA Monitors  Post Op Pain Control Plan: multimodal analgesia  Induction:  IV  Informed Consent: Informed consent signed with the Patient and all parties understand the risks and agree with anesthesia plan.  All questions answered.   ASA Score: 3  Day of Surgery Review of History & Physical: H&P Update referred to the surgeon/provider.  Anesthesia Plan Notes: 61 year old man w/ parosysmal afib, now in afib, borderline rate controlled and with new echo showing 20% EF. On room air his O2 sat is 90-94%. He has an elevated BNP,(350) but no where near the highs that the BNP reached on previous CHF exacerbations (2,000). Yday's cxr looks relatively free of CHF/edema.   Consider using cetacaine to reduce the amount of general anesthetic  required in this man with 20% EF.     Ready For Surgery From Anesthesia Perspective.     .

## 2022-05-18 NOTE — ASSESSMENT & PLAN NOTE
Suspect tachycardia induced cardiomyopathy.   No signs of acute exacerbation. Euvolemic on exam  BNP  Recent Labs   Lab 05/17/22  1135   *     Not currently on GDMT as outpatient- unclear why   Cardiology consulted - will need to start these Rx at some point  Needs Cardiology follow up

## 2022-05-18 NOTE — ASSESSMENT & PLAN NOTE
Patient with Paroxysmal (<7 days) atrial fibrillation which is uncontrolled currently. Patient is currently in atrial fibrillation with RVR.    Rate/rhythm control: amio gtt- did not convert  Anticoagulation: lovenox  Cardiology consulted- NADIYA/DCCV today     [id number] - VASc score:  Congestive Heart Failure or EF < 35% YES  +1   Hypertension NO   Age >= 75 NO   Diabetes Mellitus NO   Stroke/TIA prior history NO   Vascular disease (PAD, MI or Aortic Plaque)? NO   Age 64 - 74 NO   Female NO   Total 1

## 2022-05-18 NOTE — SUBJECTIVE & OBJECTIVE
Interval History: No complaints today. He is concerned because he cannot fell that he is in Afib.     Review of Systems   Constitutional:  Negative for chills and fever.   Respiratory:  Negative for shortness of breath.    Cardiovascular:  Negative for chest pain and palpitations.   Gastrointestinal:  Negative for abdominal pain, nausea and vomiting.   Genitourinary:  Negative for difficulty urinating.   Musculoskeletal:  Negative for arthralgias and myalgias.   Psychiatric/Behavioral:  Negative for confusion.    Objective:     Vital Signs (Most Recent):  Temp: 98.3 °F (36.8 °C) (05/18/22 1130)  Pulse: 103 (05/18/22 1130)  Resp: 18 (05/18/22 1130)  BP: (!) 147/96 (05/18/22 1115)  SpO2: (!) 91 % (05/18/22 1130)   Vital Signs (24h Range):  Temp:  [97.7 °F (36.5 °C)-98.4 °F (36.9 °C)] 98.3 °F (36.8 °C)  Pulse:  [] 103  Resp:  [10-25] 18  SpO2:  [89 %-97 %] 91 %  BP: ()/() 147/96     Weight: 96.3 kg (212 lb 4.9 oz)  Body mass index is 34.27 kg/m².    Intake/Output Summary (Last 24 hours) at 5/18/2022 1221  Last data filed at 5/18/2022 0850  Gross per 24 hour   Intake 381.87 ml   Output 735 ml   Net -353.13 ml      Physical Exam  Vitals and nursing note reviewed.   Constitutional:       General: He is not in acute distress.     Appearance: He is obese. He is not toxic-appearing.   HENT:      Head: Normocephalic and atraumatic.      Nose: Nose normal.      Mouth/Throat:      Mouth: Mucous membranes are moist.   Cardiovascular:      Rate and Rhythm: Tachycardia present. Rhythm irregular.      Pulses: Normal pulses.      Heart sounds: Normal heart sounds. No murmur heard.    No gallop.      Comments: Tele: Afib HR in 130s  Pulmonary:      Effort: Pulmonary effort is normal. No respiratory distress.      Breath sounds: Normal breath sounds. No wheezing or rales.      Comments: Room air  Abdominal:      General: Bowel sounds are normal. There is no distension.      Palpations: Abdomen is soft.       Tenderness: There is no abdominal tenderness. There is no guarding.   Musculoskeletal:      Right lower leg: No edema.      Left lower leg: No edema.   Skin:     General: Skin is warm and dry.   Neurological:      Mental Status: He is alert. Mental status is at baseline.       Significant Labs: All pertinent labs within the past 24 hours have been reviewed.    Significant Imaging: I have reviewed all pertinent imaging results/findings within the past 24 hours.

## 2022-05-18 NOTE — ASSESSMENT & PLAN NOTE
Body mass index is 34.27 kg/m². Morbid obesity complicates all aspects of disease management from diagnostic modalities to treatment. Weight loss encouraged and health benefits explained to patient.

## 2022-05-18 NOTE — PROGRESS NOTES
[facility] Medicine  Progress Note    Patient Name: Timmy Wade  MRN: [id number]  Patient Class: IP- Inpatient   Admission Date: 5/17/2022  Length of Stay: 1 days  Attending Physician: Josey Amezquita MD  Primary Care Provider: Robert Bravo MD        Subjective:     Principal Problem:Paroxysmal atrial fibrillation with RVR        HPI:  Mr Timmy Wade is a 61 y.o. man with paroxysmal Afib, chronic systolic/diastolic CHF who presents with lightheadedness.     He has history of CHF with exacerbation and Afib RVR requiring cardioversion in the past. He states he was seen in follow up and told that his heart failure had resolved. He is not currently taking any medications at home. He does smoke cigarettes. He denies chest pain, shortness of breath, palpitations, nausea, vomiting, lower extremity edema, abdominal distension.     In ED, he was found to have Afib RVR. Started amio gtt.       Overview/Hospital Course:  Mr Timmy Wade is a 61 y.o. man with chronic systolic/diastolic CHF and paroxysmal Afib who was admitted with Afib RVR. Started amio gtt. Cardiology consulted. Did not convert. NADIYA/DCCV planned for 5/18.       Interval History: No complaints today. He is concerned because he cannot fell that he is in Afib.     Review of Systems   Constitutional:  Negative for chills and fever.   Respiratory:  Negative for shortness of breath.    Cardiovascular:  Negative for chest pain and palpitations.   Gastrointestinal:  Negative for abdominal pain, nausea and vomiting.   Genitourinary:  Negative for difficulty urinating.   Musculoskeletal:  Negative for arthralgias and myalgias.   Psychiatric/Behavioral:  Negative for confusion.    Objective:     Vital Signs (Most Recent):  Temp: 98.3 °F (36.8 °C) (05/18/22 1130)  Pulse: 103 (05/18/22 1130)  Resp: 18 (05/18/22 1130)  BP: (!) 147/96 (05/18/22 1115)  SpO2: (!) 91 % (05/18/22 1130)   Vital Signs (24h Range):  Temp:  [97.7  °F (36.5 °C)-98.4 °F (36.9 °C)] 98.3 °F (36.8 °C)  Pulse:  [] 103  Resp:  [10-25] 18  SpO2:  [89 %-97 %] 91 %  BP: ()/() 147/96     Weight: 96.3 kg (212 lb 4.9 oz)  Body mass index is 34.27 kg/m².    Intake/Output Summary (Last 24 hours) at 5/18/2022 1221  Last data filed at 5/18/2022 0850  Gross per 24 hour   Intake 381.87 ml   Output 735 ml   Net -353.13 ml      Physical Exam  Vitals and nursing note reviewed.   Constitutional:       General: He is not in acute distress.     Appearance: He is obese. He is not toxic-appearing.   HENT:      Head: Normocephalic and atraumatic.      Nose: Nose normal.      Mouth/Throat:      Mouth: Mucous membranes are moist.   Cardiovascular:      Rate and Rhythm: Tachycardia present. Rhythm irregular.      Pulses: Normal pulses.      Heart sounds: Normal heart sounds. No murmur heard.    No gallop.      Comments: Tele: Afib HR in 130s  Pulmonary:      Effort: Pulmonary effort is normal. No respiratory distress.      Breath sounds: Normal breath sounds. No wheezing or rales.      Comments: Room air  Abdominal:      General: Bowel sounds are normal. There is no distension.      Palpations: Abdomen is soft.      Tenderness: There is no abdominal tenderness. There is no guarding.   Musculoskeletal:      Right lower leg: No edema.      Left lower leg: No edema.   Skin:     General: Skin is warm and dry.   Neurological:      Mental Status: He is alert. Mental status is at baseline.       Significant Labs: All pertinent labs within the past 24 hours have been reviewed.    Significant Imaging: I have reviewed all pertinent imaging results/findings within the past 24 hours.      Assessment/Plan:      * Paroxysmal atrial fibrillation with RVR  Patient with Paroxysmal (<7 days) atrial fibrillation which is uncontrolled currently. Patient is currently in atrial fibrillation with RVR.    Rate/rhythm control: amio gtt- did not convert  Anticoagulation: lovenox  Cardiology  consulted- NADIYA/DCCV today     [id number] - VASc score:  Congestive Heart Failure or EF < 35% YES  +1   Hypertension NO   Age >= 75 NO   Diabetes Mellitus NO   Stroke/TIA prior history NO   Vascular disease (PAD, MI or Aortic Plaque)? NO   Age 64 - 74 NO   Female NO   Total 1         Prediabetes  Lab Results   Component Value Date    HGBA1C 5.7 (H) 05/17/2022           Tobacco use disorder  Patient was counseled on smoking cessation for 4 minutes. Patient smokes 1ppd. We discussed how smoking is detrimental to the patient's health. Prescription for nicotine patch was offered and accepted. Patient is ready to quit        Obesity (BMI 30-39.9)  Body mass index is 34.27 kg/m². Morbid obesity complicates all aspects of disease management from diagnostic modalities to treatment. Weight loss encouraged and health benefits explained to patient.         Chronic combined systolic and diastolic congestive heart failure  Suspect tachycardia induced cardiomyopathy.   No signs of acute exacerbation. Euvolemic on exam  BNP  Recent Labs   Lab 05/17/22  1135   *     Not currently on GDMT as outpatient- unclear why   Cardiology consulted - will need to start these Rx at some point  Needs Cardiology follow up        VTE Risk Mitigation (From admission, onward)         Ordered     enoxaparin injection 100 mg  Every 12 hours (non-standard times)         05/17/22 1626     IP VTE HIGH RISK PATIENT  Once         05/17/22 1443     Place MICHAEL hose  Until discontinued         05/17/22 1443     Place sequential compression device  Until discontinued         05/17/22 1443                Discharge Planning   EDNNY:      Code Status: Full Code   Is the patient medically ready for discharge?:     Reason for patient still in hospital (select all that apply): Patient trending condition               Critical care time spent on the evaluation and treatment of severe organ dysfunction, review of pertinent labs and imaging studies, discussions with  consulting providers and discussions with patient/family: 20 minutes.      Josey Amezquita MD  Department of Hospital Medicine   [facility] - Intensive Care

## 2022-05-18 NOTE — TRANSFER OF CARE
"Anesthesia Transfer of Care Note    Patient: Timmy Wade    Procedure(s) Performed: Procedure(s) (LRB):  Transesophageal echo (NADIYA) intra-procedure log documentation (N/A)    Patient location: Other: OR 5 cATH lAB rn    Anesthesia Type: MAC    Transport from OR: Transported from OR on 100% O2 by closed face mask with adequate spontaneous ventilation    Post pain: adequate analgesia    Post assessment: no apparent anesthetic complications and tolerated procedure well    Post vital signs: stable    Level of consciousness: awake    Nausea/Vomiting: no nausea/vomiting    Complications: none    Transfer of care protocol was followed      Last vitals:   Visit Vitals  /89   Pulse (!) 175   Temp 36.8 °C (98.3 °F)   Resp 18   Ht 5' 6" (1.676 m)   Wt 96.3 kg (212 lb 4.9 oz)   SpO2 (!) 86%   BMI 34.27 kg/m²     "

## 2022-05-18 NOTE — PLAN OF CARE
Pt remains in ICU alert and oriented. Amio infusing. HR running A-fib on monitor. HR between 90-120s. BP stable. Pt on RA sating above 90%. Afebrile. PO medication given, swallowed without difficulty. Pt NPO after midnight for scheduled NADIYA. Voids via urinal with good urine output. Pt updated on plan of care. No new falls, injuries, or skin breakdown this shift.

## 2022-05-18 NOTE — ANESTHESIA POSTPROCEDURE EVALUATION
Anesthesia Post Evaluation    Patient: Timmy Wade    Procedure(s) Performed: Procedure(s) (LRB):  Transesophageal echo (NADIYA) intra-procedure log documentation (N/A)    Final Anesthesia Type: MAC      Patient location during evaluation: ICU  Patient participation: Yes- Able to Participate  Level of consciousness: awake and alert  Post-procedure vital signs: reviewed and stable  Pain management: adequate  Airway patency: patent    PONV status at discharge: No PONV  Anesthetic complications: no      Cardiovascular status: blood pressure returned to baseline  Respiratory status: unassisted and spontaneous ventilation  Hydration status: euvolemic  Follow-up not needed.          Vitals Value Taken Time   /81 05/18/22 1522   Temp 98 05/18/22 1524   Pulse 82 05/18/22 1523   Resp 14 05/18/22 1523   SpO2 95 % 05/18/22 1523   Vitals shown include unvalidated device data.      No case tracking events are documented in the log.      Pain/Ki Score: No data recorded

## 2022-05-18 NOTE — PLAN OF CARE
[facility] - Intensive Care  Initial Discharge Assessment       Primary Care Provider: Robert Bravo MD    Admission Diagnosis: A-fib [I48.91]  Tachycardia [R00.0]  Chest pain [R07.9]    Admission Date: 5/17/2022  Expected Discharge Date: 05/19/2022    Living Situation:  From Home Alone    Services:  None    Discharge Needs:  PCP and Cardiology F/u Appt.    Discharge Barriers Identified: No family/friends to help    Payor: MEDICAID / Plan: HEALTHY BLUE [place] / Product Type: Managed Medicaid /     No emergency contact information on file.    Discharge Plan A: Home  Discharge Plan B: Home      [id number] - [place], [place] - [address]  [address]  [place] [address]  Phone: [phone] Fax: [phone]      Initial Assessment (most recent)       Adult Discharge Assessment - 05/18/22 1746          Discharge Assessment    Assessment Type Discharge Planning Assessment     Confirmed/corrected address, phone number and insurance Yes     Confirmed Demographics Correct on Facesheet     Source of Information patient     When was your last doctors appointment? --   2 Weeks ago    Communicated DENNY with patient/caregiver Yes     Reason For Admission A-fib     Lives With alone     Facility Arrived From: home     Do you expect to return to your current living situation? Yes     Do you have help at home or someone to help you manage your care at home? No     Prior to hospitilization cognitive status: Alert/Oriented     Current cognitive status: Alert/Oriented     Walking or Climbing Stairs Difficulty none     Dressing/Bathing Difficulty none     Equipment Currently Used at Home none     Readmission within 30 days? No   1 ED visit prior    Patient currently being followed by outpatient case management? No     Do you currently have service(s) that help you manage your care at home? No     Do you take prescription medications? Yes     Do you have prescription coverage? Yes      Coverage medicaid     Do you have any problems affording any of your prescribed medications? No     Is the patient taking medications as prescribed? yes     Who is going to help you get home at discharge? Will call a cab     How do you get to doctors appointments? car, drives self     Are you on dialysis? No     Do you take coumadin? No     Discharge Plan A Home     Discharge Plan B Home     DME Needed Upon Discharge  none     Discharge Plan discussed with: Patient     Discharge Barriers Identified No family/friends to help

## 2022-05-18 NOTE — HOSPITAL COURSE
Mr Timmy Wade is a 61 y.o. man with chronic systolic/diastolic CHF and paroxysmal Afib who was admitted with Afib RVR. Started amio gtt. Cardiology consulted. Did not convert. NADIYA/DCCV 5/18 successfully converted him to NSR. Started PO amio, eliquis. Resume coreg and entresto. Follow up with Cardiology.

## 2022-05-18 NOTE — PROCEDURES
Procedures        NADIYA/CV   Dr Vaughan  Pre-op Dx A-fib  Post-op Dx same  Specimen none  EBL < 50 cc    5/18/22 NADIYA/CV - no thrombus in KELLY, EF 20%. CV 200J converted A-fib to NSR    Change amiodarone to po  Observe overnight  Home AM if stable

## 2022-05-19 VITALS
RESPIRATION RATE: 16 BRPM | OXYGEN SATURATION: 94 % | BODY MASS INDEX: 34.12 KG/M2 | HEART RATE: 68 BPM | SYSTOLIC BLOOD PRESSURE: 110 MMHG | HEIGHT: 66 IN | WEIGHT: 212.31 LBS | DIASTOLIC BLOOD PRESSURE: 76 MMHG | TEMPERATURE: 98 F

## 2022-05-19 LAB
ANION GAP SERPL CALC-SCNC: 10 MMOL/L (ref 8–16)
BUN SERPL-MCNC: 20 MG/DL (ref 8–23)
CALCIUM SERPL-MCNC: 8.9 MG/DL (ref 8.7–10.5)
CHLORIDE SERPL-SCNC: 108 MMOL/L (ref 95–110)
CO2 SERPL-SCNC: 23 MMOL/L (ref 23–29)
CREAT SERPL-MCNC: 0.8 MG/DL (ref 0.5–1.4)
EST. GFR  (AFRICAN AMERICAN): >60 ML/MIN/1.73 M^2
EST. GFR  (NON AFRICAN AMERICAN): >60 ML/MIN/1.73 M^2
GLUCOSE SERPL-MCNC: 102 MG/DL (ref 70–110)
POTASSIUM SERPL-SCNC: 3.7 MMOL/L (ref 3.5–5.1)
SODIUM SERPL-SCNC: 141 MMOL/L (ref 136–145)

## 2022-05-19 PROCEDURE — 80048 BASIC METABOLIC PNL TOTAL CA: CPT | Performed by: HOSPITALIST

## 2022-05-19 PROCEDURE — 36415 COLL VENOUS BLD VENIPUNCTURE: CPT | Performed by: HOSPITALIST

## 2022-05-19 PROCEDURE — S4991 NICOTINE PATCH NONLEGEND: HCPCS | Performed by: HOSPITALIST

## 2022-05-19 PROCEDURE — 99233 PR SUBSEQUENT HOSPITAL CARE,LEVL III: ICD-10-PCS | Mod: ,,, | Performed by: INTERNAL MEDICINE

## 2022-05-19 PROCEDURE — 99233 SBSQ HOSP IP/OBS HIGH 50: CPT | Mod: ,,, | Performed by: INTERNAL MEDICINE

## 2022-05-19 PROCEDURE — A4216 STERILE WATER/SALINE, 10 ML: HCPCS | Performed by: HOSPITALIST

## 2022-05-19 PROCEDURE — 25000003 PHARM REV CODE 250: Performed by: INTERNAL MEDICINE

## 2022-05-19 PROCEDURE — 25000003 PHARM REV CODE 250: Performed by: HOSPITALIST

## 2022-05-19 PROCEDURE — 94761 N-INVAS EAR/PLS OXIMETRY MLT: CPT

## 2022-05-19 RX ORDER — AMIODARONE HYDROCHLORIDE 200 MG/1
200 TABLET ORAL 2 TIMES DAILY
Qty: 60 TABLET | Refills: 2 | Status: ON HOLD | OUTPATIENT
Start: 2022-05-19 | End: 2022-09-02 | Stop reason: HOSPADM

## 2022-05-19 RX ORDER — IBUPROFEN 200 MG
1 TABLET ORAL DAILY
Qty: 28 PATCH | Refills: 0 | Status: ON HOLD | OUTPATIENT
Start: 2022-05-19 | End: 2022-09-02 | Stop reason: HOSPADM

## 2022-05-19 RX ORDER — SACUBITRIL AND VALSARTAN 24; 26 MG/1; MG/1
1 TABLET, FILM COATED ORAL 2 TIMES DAILY
Qty: 60 TABLET | Refills: 2 | Status: SHIPPED | OUTPATIENT
Start: 2022-05-19 | End: 2022-08-17

## 2022-05-19 RX ORDER — CARVEDILOL 6.25 MG/1
6.25 TABLET ORAL 2 TIMES DAILY
Qty: 60 TABLET | Refills: 2 | Status: SHIPPED | OUTPATIENT
Start: 2022-05-19 | End: 2022-08-17

## 2022-05-19 RX ADMIN — APIXABAN 5 MG: 5 TABLET, FILM COATED ORAL at 08:05

## 2022-05-19 RX ADMIN — Medication 10 ML: at 05:05

## 2022-05-19 RX ADMIN — AMIODARONE HYDROCHLORIDE 200 MG: 200 TABLET ORAL at 08:05

## 2022-05-19 RX ADMIN — POTASSIUM BICARBONATE 50 MEQ: 977.5 TABLET, EFFERVESCENT ORAL at 05:05

## 2022-05-19 RX ADMIN — CARVEDILOL 6.25 MG: 6.25 TABLET, FILM COATED ORAL at 08:05

## 2022-05-19 RX ADMIN — Medication 1 PATCH: at 08:05

## 2022-05-19 RX ADMIN — SENNOSIDES AND DOCUSATE SODIUM 1 TABLET: 50; 8.6 TABLET ORAL at 08:05

## 2022-05-19 RX ADMIN — SACUBITRIL AND VALSARTAN 1 TABLET: 24; 26 TABLET, FILM COATED ORAL at 08:05

## 2022-05-19 NOTE — NURSING
Patient discharged, AVS reviewed with opportunity for questions. IV access removed. Patient left with transfer team

## 2022-05-19 NOTE — CARE UPDATE
[facility] [place] 4 [place]      5/18/2022       Skin assessed on: Q Shift      [x] No Pressure Injuries Present    [x]Prevention Measures Documented    [] Yes LDA Previously documented     [] Yes New Pressure Injury Discovered   [] LDA Added      Attending RN:  Keri Gasca RN     Second RN:  Trish Martinez RN

## 2022-05-19 NOTE — PROGRESS NOTES
[facility]  Cardiology  Progress Note    Patient Name: Timmy Wade  MRN: [id number]  Admission Date: 5/17/2022  Hospital Length of Stay: 2 days  Code Status: Full Code   Attending Physician: Josey Amezquita MD   Primary Care Physician: Robert Bravo MD  Expected Discharge Date: 5/19/2022  Principal Problem:Paroxysmal atrial fibrillation with RVR    Subjective:     Hospital Course:   5/19/22 Feels better. Wants to go home. Walking around ICU room. NSR    5/18/22 NADIYA/CV - no thrombus in KELLY, EF 20%. CV 200J converted A-fib to NSR     Echo 5/17/22  · The left ventricle is mildly enlarged with concentric hypertrophy and severely decreased systolic function.  · The estimated ejection fraction is 20%.  · Left ventricular diastolic dysfunction.  · Normal right ventricular size with normal right ventricular systolic function.  · Mild left atrial enlargement.  · Mild right atrial enlargement.  · Mild tricuspid regurgitation.  · Intermediate central venous pressure (8 mmHg).  · The estimated PA systolic pressure is 26 mmHg.            Interval History:     Review of Systems   Constitutional: Negative for decreased appetite.   HENT:  Negative for ear discharge.    Eyes:  Negative for blurred vision.   Endocrine: Negative for polyphagia.   Skin:  Negative for nail changes.   Genitourinary:  Negative for bladder incontinence.   Neurological:  Negative for aphonia.   Psychiatric/Behavioral:  Negative for hallucinations.    Allergic/Immunologic: Negative for hives.   Objective:     Vital Signs (Most Recent):  Temp: 98.4 °F (36.9 °C) (05/19/22 0345)  Pulse: 68 (05/19/22 0630)  Resp: 16 (05/19/22 0630)  BP: 110/76 (05/19/22 0630)  SpO2: (!) 94 % (05/19/22 0630)   Vital Signs (24h Range):  Temp:  [97.7 °F (36.5 °C)-98.5 °F (36.9 °C)] 98.4 °F (36.9 °C)  Pulse:  [] 68  Resp:  [6-26] 16  SpO2:  [86 %-100 %] 94 %  BP: ()/() 110/76     Weight: 96.3 kg (212 lb 4.9 oz)  Body mass index is 34.27  kg/m².     SpO2: (!) 94 %  O2 Device (Oxygen Therapy): room air      Intake/Output Summary (Last 24 hours) at 5/19/2022 0834  Last data filed at 5/19/2022 0600  Gross per 24 hour   Intake 940 ml   Output 710 ml   Net 230 ml       Lines/Drains/Airways       Peripheral Intravenous Line  Duration                  Peripheral IV - Single Lumen 05/17/22 1129 20 G Left Antecubital 1 day                    Physical Exam  Constitutional:       Appearance: He is well-developed.   HENT:      Head: Normocephalic and atraumatic.   Eyes:      Conjunctiva/sclera: Conjunctivae normal.      Pupils: Pupils are equal, round, and reactive to light.   Cardiovascular:      Rate and Rhythm: Normal rate.      Pulses: Intact distal pulses.      Heart sounds: Normal heart sounds.   Pulmonary:      Effort: Pulmonary effort is normal.      Breath sounds: Normal breath sounds.   Abdominal:      General: Bowel sounds are normal.      Palpations: Abdomen is soft.   Musculoskeletal:         General: Normal range of motion.      Cervical back: Normal range of motion and neck supple.   Skin:     General: Skin is warm and dry.   Neurological:      Mental Status: He is alert and oriented to person, place, and time.       Significant Labs: All pertinent lab results from the last 24 hours have been reviewed.    Significant Imaging: Echocardiogram: Transthoracic echo (TTE) complete (Cupid Only):   Results for orders placed or performed during the hospital encounter of 05/17/22   Echo   Result Value Ref Range    LA WIDTH 4.62 cm    AORTIC VALVE CUSP SEPERATION 2.25 cm    PV PEAK VELOCITY 0.65 cm/s    LVIDd 5.64 3.5 - 6.0 cm    IVS 1.19 (A) 0.6 - 1.1 cm    Posterior Wall 1.24 (A) 0.6 - 1.1 cm    Ao root annulus 3.17 cm    LVIDs 4.95 (A) 2.1 - 4.0 cm    FS 12 28 - 44 %    LA volume 116.50 cm3    Sinus 3.46 cm    STJ 2.95 cm    Ascending aorta 3.22 cm    LV mass 288.60 g    LA size 4.64 cm    RVDD 3.28 cm    Left Ventricle Relative Wall Thickness 0.44 cm     AV mean gradient 2 mmHg    AV valve area 2.65 cm2    AV Velocity Ratio 0.67     AV index (prosthetic) 0.67     LVOT diameter 2.24 cm    LVOT area 3.9 cm2    LVOT peak reymundo 0.60 m/s    LVOT peak VTI 10.31 cm    Ao peak reymundo 0.89 m/s    Ao VTI 15.33 cm    LVOT stroke volume 40.61 cm3    AV peak gradient 3 mmHg    TR Max Reymundo 2.12 m/s    LV Systolic Volume 115.28 mL    LV Diastolic Volume 156.41 mL    RA Major Axis 6.53 cm    Left Atrium Minor Axis 6.20 cm    Left Atrium Major Axis 6.60 cm    Triscuspid Valve Regurgitation Peak Gradient 18 mmHg    RA Width 4.98 cm    Right Atrial Pressure (from IVC) 8 mmHg    EF 20 %    TV rest pulmonary artery pressure 26 mmHg    Narrative    · The left ventricle is mildly enlarged with concentric hypertrophy and   severely decreased systolic function.  · The estimated ejection fraction is 20%.  · Left ventricular diastolic dysfunction.  · Normal right ventricular size with normal right ventricular systolic   function.  · Mild left atrial enlargement.  · Mild right atrial enlargement.  · Mild tricuspid regurgitation.  · Intermediate central venous pressure (8 mmHg).  · The estimated PA systolic pressure is 26 mmHg.        Assessment and Plan:     Brief HPI:     * Paroxysmal atrial fibrillation with RVR  Long Hx PAF and medical noncompliance. HR improving but still with A-fib on IV amiodarone and eliquis. NADIYA/CV tomorrow if A-fib persists. Repeat echo - Hx CM EF 20%. Out patient f/u with Dr Evans    5/19/22 NSR after CV. Ok for d/c on eliquis and po amiodarone    Tobacco use disorder  counseled    Chronic combined systolic and diastolic congestive heart failure  Hx CM - ? Tachy-myopathy. Repeat echo. Volume status fairly stable     Echo    Interpretation Summary  · The left ventricle is mildly enlarged with eccentric hypertrophy and severely decreased systolic function.  · The estimated ejection fraction is 20%.  · Left ventricular diastolic dysfunction.  · Moderate right ventricular  enlargement with moderately reduced right ventricular systolic function.  · Moderate left atrial enlargement.  · Moderate right atrial enlargement.  · Mild mitral regurgitation.  · Mild to moderate tricuspid regurgitation.  · Intermediate central venous pressure (8 mmHg).  · The estimated PA systolic pressure is 20 mmHg.    Recent Labs   Lab 05/17/22  1135   *   .          VTE Risk Mitigation (From admission, onward)         Ordered     apixaban tablet 5 mg  2 times daily         05/18/22 1446     IP VTE HIGH RISK PATIENT  Once         05/17/22 1443     Place MICHAEL hose  Until discontinued         05/17/22 1443     Place sequential compression device  Until discontinued         05/17/22 1443                Chris Vaughan MD  Cardiology  [facility] - [place], [place] - Intensive Care

## 2022-05-19 NOTE — ASSESSMENT & PLAN NOTE
Long Hx PAF and medical noncompliance. HR improving but still with A-fib on IV amiodarone and eliquis. NADIYA/CV tomorrow if A-fib persists. Repeat echo - Hx CM EF 20%. Out patient f/u with Dr Evans    5/19/22 NSR after CV. Ok for d/c on eliquis and po amiodarone

## 2022-05-19 NOTE — PLAN OF CARE
[facility] - Intensive Care  Discharge Final Note    Primary Care Provider: Robert Bravo MD    Expected Discharge Date: 5/19/2022    Final Discharge Note (most recent)       Final Note - 05/19/22 1758          Final Note    Assessment Type Final Discharge Note     Anticipated Discharge Disposition Home or Self Care        Post-Acute Status    Discharge Delays None known at this time   Patient discharge this a.m. at approximately 8:53 a.m. prior to SW contact.                    Important Message from Medicare           Patient discharged this a.m. prior to SW contact.  No appts were scheduled.

## 2022-05-19 NOTE — DISCHARGE SUMMARY
[facility] Medicine  Discharge Summary      Patient Name: Timmy Wade  MRN: [id number]  Patient Class: IP- Inpatient  Admission Date: 5/17/2022  Hospital Length of Stay: 2 days  Discharge Date and Time:  05/19/2022 10:46 AM  Attending Physician: No att. providers found   Discharging Provider: Josey Amezquita MD  Primary Care Provider: Robert Bravo MD      HPI:   Mr Timmy Wade is a 61 y.o. man with paroxysmal Afib, chronic systolic/diastolic CHF who presents with lightheadedness.     He has history of CHF with exacerbation and Afib RVR requiring cardioversion in the past. He states he was seen in follow up and told that his heart failure had resolved. He is not currently taking any medications at home. He does smoke cigarettes. He denies chest pain, shortness of breath, palpitations, nausea, vomiting, lower extremity edema, abdominal distension.     In ED, he was found to have Afib RVR. Started amio gtt.       Procedure(s) (LRB):  Transesophageal echo (NADIYA) intra-procedure log documentation (N/A)      Hospital Course:   Mr Timmy Wade is a 61 y.o. man with chronic systolic/diastolic CHF and paroxysmal Afib who was admitted with Afib RVR. Started amio gtt. Cardiology consulted. Did not convert. NADIYA/DCCV 5/18 successfully converted him to NSR. Started PO amio, eliquis. Resume coreg and entresto. Follow up with Cardiology.        Goals of Care Treatment Preferences:  Code Status: Full Code      Consults:   Consults (From admission, onward)        Status Ordering Provider     Inpatient consult to Cardiology  Once        Provider:  Chris Vaughan MD    Acknowledged LIANET NOOR          No new Assessment & Plan notes have been filed under this hospital service since the last note was generated.  Service: Hospital Medicine    Final Active Diagnoses:    Diagnosis Date Noted POA    PRINCIPAL PROBLEM:  Paroxysmal atrial fibrillation with RVR [I48.0] 12/07/2012 Yes     Prediabetes [R73.03] 08/25/2021 Yes    Chronic combined systolic and diastolic congestive heart failure [I50.42] 08/09/2021 Yes    Tobacco use disorder [F17.200] 08/09/2021 Yes    Obesity (BMI 30-39.9) [E66.9] 08/09/2021 Yes      Problems Resolved During this Admission:       Discharged Condition: good    Disposition: Home or Self Care    Follow Up: Cardiology    Patient Instructions:      Diet Cardiac     Notify your health care provider if you experience any of the following:  temperature >100.4     Notify your health care provider if you experience any of the following:  persistent nausea and vomiting or diarrhea     Notify your health care provider if you experience any of the following:  severe uncontrolled pain     Notify your health care provider if you experience any of the following:  redness, tenderness, or signs of infection (pain, swelling, redness, odor or green/yellow discharge around incision site)     Notify your health care provider if you experience any of the following:  difficulty breathing or increased cough     Notify your health care provider if you experience any of the following:  severe persistent headache     Notify your health care provider if you experience any of the following:  worsening rash     Notify your health care provider if you experience any of the following:  persistent dizziness, light-headedness, or visual disturbances     Notify your health care provider if you experience any of the following:  increased confusion or weakness     Activity as tolerated       Significant Diagnostic Studies: Labs: All labs within the past 24 hours have been reviewed    Pending Diagnostic Studies:     Procedure Component Value Units Date/Time    EKG 12-lead [[id number]]     Order Status: Sent Lab Status: No result          Medications:  Reconciled Home Medications:      Medication List      CHANGE how you take these medications    amiodarone 200 MG Tab  Commonly known as: PACERONE  Take 1  tablet (200 mg total) by mouth 2 (two) times daily.  What changed: when to take this        CONTINUE taking these medications    apixaban 5 mg Tab  Commonly known as: ELIQUIS  Take 1 tablet (5 mg total) by mouth 2 (two) times daily.     carvediloL 6.25 MG tablet  Commonly known as: COREG  Take 1 tablet (6.25 mg total) by mouth 2 (two) times daily.     ENTRESTO 24-26 mg per tablet  Generic drug: sacubitriL-valsartan  Take 1 tablet by mouth 2 (two) times daily.     nicotine 14 mg/24 hr  Commonly known as: NICODERM CQ  Place 1 patch onto the skin once daily.        STOP taking these medications    Anti-FungaL 2 % top powder  Generic drug: miconazole NITRATE 2 %     furosemide 40 MG tablet  Commonly known as: LASIX     potassium chloride 10 MEQ Tbsr  Commonly known as: KLOR-CON     traMADoL 50 mg tablet  Commonly known as: ULTRAM            Indwelling Lines/Drains at time of discharge:   Lines/Drains/Airways     None                 Time spent on the discharge of patient: 35 minutes      Josey Amezquita MD  Department of Hospital Medicine  [facility]

## 2022-05-19 NOTE — CARE UPDATE
[facility] Intensive Care  ICU Shift Summary  Date: 5/18/2022      Prehospitalization: Home  Admit Date / LOS : 5/17/2022/ 1 days    Diagnosis: Paroxysmal atrial fibrillation with RVR    Consults:        Active: Cardio       Needed: N/A     Code Status: Full Code   Advanced Directive: <no information>    LDA:  Lines/Drains/Airways     Peripheral Intravenous Line  Duration                Peripheral IV - Single Lumen 05/17/22 1129 20 G Left Antecubital 1 day         Peripheral IV - Single Lumen 05/17/22 1130 20 G Right Antecubital 1 day              Central Lines/Site/Justification:Patient Does Not Have Central Line  Urinary Cath/Order/Justification:Patient Does Not Have Urinary Catheter    Vasopressors/Infusions:        GOALS: Volume/ Hemodynamic: N/A                     RASS: 0  alert and calm    Pain Management: none       Pain Controlled: not applicable     Rhythm: NSR    Respiratory Device: Room Air    Oxygen Concentration (%):  [21] 21             Most Recent SBT/ SAT: N/A       MOVE Screen: PASS  ICU Liberation: not applicable    VTE Prophylaxis: Mechanical  Mobility: Bedrest  Stress Ulcer Prophylaxis: No    Isolation: No active isolations    Dietary:   Current Diet Order   Procedures    Diet Cardiac      Tolerance: yes  Advancement: yes    I & O (24h):    Intake/Output Summary (Last 24 hours) at 5/18/2022 1923  Last data filed at 5/18/2022 1434  Gross per 24 hour   Intake 481.87 ml   Output 735 ml   Net -253.13 ml        Restraints: No    Significant Dates:  Post Op Date: N/A  Rescue Date: N/A  Imaging/ Diagnostics: N/A    Noteworthy Labs:  none    COVID Test: (--)  CBC/Anemia Labs: Coags:    Recent Labs   Lab 05/17/22  1135 05/17/22  1547   WBC 8.68 10.79   HGB 18.5* 16.4   HCT 54.9* 48.2    281   MCV 90 89   RDW 13.7 13.7    Recent Labs   Lab 05/17/22  1547   INR 1.0        Chemistries:   Recent Labs   Lab 05/17/22  1135 05/17/22  1547 05/18/22  0246    140 139   K 4.2 4.5 3.9    105 106    CO2 27 27 26   BUN 15 18 21   CREATININE 1.2 1.4 1.0   CALCIUM 10.3 9.5 9.1   PROT 8.0  --   --    BILITOT 1.5*  --   --    ALKPHOS 57  --   --    ALT 28  --   --    AST 26  --   --         Cardiac Enzymes: Ejection Fractions:    Recent Labs     05/17/22  1135   TROPONINI 0.018    EF   Date Value Ref Range Status   05/18/2022 20 % Final        POCT Glucose: HbA1c:    Recent Labs   Lab 05/17/22  1109 05/18/22  0739 05/18/22  1148   POCTGLUCOSE 124* 97 95    Hemoglobin A1C   Date Value Ref Range Status   05/17/2022 5.7 (H) 4.0 - 5.6 % Final     Comment:     ADA Screening Guidelines:  5.7-6.4%  Consistent with prediabetes  >or=6.5%  Consistent with diabetes    High levels of fetal hemoglobin interfere with the HbA1C  assay. Heterozygous hemoglobin variants (HbS, HgC, etc)do  not significantly interfere with this assay.   However, presence of multiple variants may affect accuracy.             ICU LOS 1d 3h  Level of Care: OK to Transfer    Chart Check: 12 HR Done  Shift Summary/Plan for the shift:     Pt remains in ICU. Vital signs WNL. No falls, injuries or skin breakdown.

## 2022-05-19 NOTE — PLAN OF CARE
"No acute issues overnight. Pt moves well in bed without assist. HR WNL. No afib noted. Pt reports feeling "much better" Pt requested and ate 2 sandwiches.PRN potassium given per order. Using urinal @ bs to void. Room air. 2PIV sites clean dry and intact. Safety maintainted. PT free from injury      "

## 2022-05-19 NOTE — HOSPITAL COURSE
5/19/22 Feels better. Wants to go home. Walking around ICU room. NSR    5/18/22 NADIYA/CV - no thrombus in KELLY, EF 20%. CV 200J converted A-fib to NSR     Echo 5/17/22  The left ventricle is mildly enlarged with concentric hypertrophy and severely decreased systolic function.  The estimated ejection fraction is 20%.  Left ventricular diastolic dysfunction.  Normal right ventricular size with normal right ventricular systolic function.  Mild left atrial enlargement.  Mild right atrial enlargement.  Mild tricuspid regurgitation.  Intermediate central venous pressure (8 mmHg).  The estimated PA systolic pressure is 26 mmHg.

## 2022-05-19 NOTE — SUBJECTIVE & OBJECTIVE
Interval History:     Review of Systems   Constitutional: Negative for decreased appetite.   HENT:  Negative for ear discharge.    Eyes:  Negative for blurred vision.   Endocrine: Negative for polyphagia.   Skin:  Negative for nail changes.   Genitourinary:  Negative for bladder incontinence.   Neurological:  Negative for aphonia.   Psychiatric/Behavioral:  Negative for hallucinations.    Allergic/Immunologic: Negative for hives.   Objective:     Vital Signs (Most Recent):  Temp: 98.4 °F (36.9 °C) (05/19/22 0345)  Pulse: 68 (05/19/22 0630)  Resp: 16 (05/19/22 0630)  BP: 110/76 (05/19/22 0630)  SpO2: (!) 94 % (05/19/22 0630)   Vital Signs (24h Range):  Temp:  [97.7 °F (36.5 °C)-98.5 °F (36.9 °C)] 98.4 °F (36.9 °C)  Pulse:  [] 68  Resp:  [6-26] 16  SpO2:  [86 %-100 %] 94 %  BP: ()/() 110/76     Weight: 96.3 kg (212 lb 4.9 oz)  Body mass index is 34.27 kg/m².     SpO2: (!) 94 %  O2 Device (Oxygen Therapy): room air      Intake/Output Summary (Last 24 hours) at 5/19/2022 0834  Last data filed at 5/19/2022 0600  Gross per 24 hour   Intake 940 ml   Output 710 ml   Net 230 ml       Lines/Drains/Airways       Peripheral Intravenous Line  Duration                  Peripheral IV - Single Lumen 05/17/22 1129 20 G Left Antecubital 1 day                    Physical Exam  Constitutional:       Appearance: He is well-developed.   HENT:      Head: Normocephalic and atraumatic.   Eyes:      Conjunctiva/sclera: Conjunctivae normal.      Pupils: Pupils are equal, round, and reactive to light.   Cardiovascular:      Rate and Rhythm: Normal rate.      Pulses: Intact distal pulses.      Heart sounds: Normal heart sounds.   Pulmonary:      Effort: Pulmonary effort is normal.      Breath sounds: Normal breath sounds.   Abdominal:      General: Bowel sounds are normal.      Palpations: Abdomen is soft.   Musculoskeletal:         General: Normal range of motion.      Cervical back: Normal range of motion and neck supple.    Skin:     General: Skin is warm and dry.   Neurological:      Mental Status: He is alert and oriented to person, place, and time.       Significant Labs: All pertinent lab results from the last 24 hours have been reviewed.    Significant Imaging: Echocardiogram: Transthoracic echo (TTE) complete (Cupid Only):   Results for orders placed or performed during the hospital encounter of 05/17/22   Echo   Result Value Ref Range    LA WIDTH 4.62 cm    AORTIC VALVE CUSP SEPERATION 2.25 cm    PV PEAK VELOCITY 0.65 cm/s    LVIDd 5.64 3.5 - 6.0 cm    IVS 1.19 (A) 0.6 - 1.1 cm    Posterior Wall 1.24 (A) 0.6 - 1.1 cm    Ao root annulus 3.17 cm    LVIDs 4.95 (A) 2.1 - 4.0 cm    FS 12 28 - 44 %    LA volume 116.50 cm3    Sinus 3.46 cm    STJ 2.95 cm    Ascending aorta 3.22 cm    LV mass 288.60 g    LA size 4.64 cm    RVDD 3.28 cm    Left Ventricle Relative Wall Thickness 0.44 cm    AV mean gradient 2 mmHg    AV valve area 2.65 cm2    AV Velocity Ratio 0.67     AV index (prosthetic) 0.67     LVOT diameter 2.24 cm    LVOT area 3.9 cm2    LVOT peak reymundo 0.60 m/s    LVOT peak VTI 10.31 cm    Ao peak reymundo 0.89 m/s    Ao VTI 15.33 cm    LVOT stroke volume 40.61 cm3    AV peak gradient 3 mmHg    TR Max Reymundo 2.12 m/s    LV Systolic Volume 115.28 mL    LV Diastolic Volume 156.41 mL    RA Major Axis 6.53 cm    Left Atrium Minor Axis 6.20 cm    Left Atrium Major Axis 6.60 cm    Triscuspid Valve Regurgitation Peak Gradient 18 mmHg    RA Width 4.98 cm    Right Atrial Pressure (from IVC) 8 mmHg    EF 20 %    TV rest pulmonary artery pressure 26 mmHg    Narrative    · The left ventricle is mildly enlarged with concentric hypertrophy and   severely decreased systolic function.  · The estimated ejection fraction is 20%.  · Left ventricular diastolic dysfunction.  · Normal right ventricular size with normal right ventricular systolic   function.  · Mild left atrial enlargement.  · Mild right atrial enlargement.  · Mild tricuspid regurgitation.  ·  Intermediate central venous pressure (8 mmHg).  · The estimated PA systolic pressure is 26 mmHg.

## 2022-05-24 LAB
POCT GLUCOSE: 116 MG/DL (ref 70–110)
POCT GLUCOSE: 137 MG/DL (ref 70–110)
POCT GLUCOSE: 164 MG/DL (ref 70–110)
POCT GLUCOSE: 98 MG/DL (ref 70–110)

## 2022-09-01 ENCOUNTER — HOSPITAL ENCOUNTER (INPATIENT)
Facility: HOSPITAL | Age: 62
LOS: 1 days | Discharge: HOME | DRG: 309 | End: 2022-09-02
Attending: EMERGENCY MEDICINE | Admitting: HOSPITALIST
Payer: MEDICAID

## 2022-09-01 DIAGNOSIS — R00.2 PALPITATIONS: ICD-10-CM

## 2022-09-01 DIAGNOSIS — R79.89 ELEVATED TROPONIN: ICD-10-CM

## 2022-09-01 DIAGNOSIS — I48.91 ATRIAL FIBRILLATION WITH RVR: Primary | ICD-10-CM

## 2022-09-01 DIAGNOSIS — I48.0 PAROXYSMAL ATRIAL FIBRILLATION WITH RVR: ICD-10-CM

## 2022-09-01 DIAGNOSIS — I50.9 CONGESTIVE HEART FAILURE, UNSPECIFIED HF CHRONICITY, UNSPECIFIED HEART FAILURE TYPE: ICD-10-CM

## 2022-09-01 PROCEDURE — 96365 THER/PROPH/DIAG IV INF INIT: CPT

## 2022-09-01 PROCEDURE — 99291 CRITICAL CARE FIRST HOUR: CPT | Mod: 25

## 2022-09-01 PROCEDURE — 12000002 HC ACUTE/MED SURGE SEMI-PRIVATE ROOM

## 2022-09-01 PROCEDURE — 96375 TX/PRO/DX INJ NEW DRUG ADDON: CPT

## 2022-09-02 ENCOUNTER — ANESTHESIA EVENT (OUTPATIENT)
Dept: CARDIOLOGY | Facility: HOSPITAL | Age: 62
DRG: 309 | End: 2022-09-02
Payer: MEDICAID

## 2022-09-02 ENCOUNTER — ANESTHESIA (OUTPATIENT)
Dept: CARDIOLOGY | Facility: HOSPITAL | Age: 62
DRG: 309 | End: 2022-09-02
Payer: MEDICAID

## 2022-09-02 VITALS
BODY MASS INDEX: 36.22 KG/M2 | TEMPERATURE: 98 F | WEIGHT: 225.38 LBS | SYSTOLIC BLOOD PRESSURE: 123 MMHG | HEIGHT: 66 IN | OXYGEN SATURATION: 98 % | HEART RATE: 81 BPM | DIASTOLIC BLOOD PRESSURE: 92 MMHG | RESPIRATION RATE: 20 BRPM

## 2022-09-02 PROBLEM — F41.9 ANXIETY: Status: RESOLVED | Noted: 2021-08-25 | Resolved: 2022-09-02

## 2022-09-02 LAB
ALBUMIN SERPL BCP-MCNC: 3.3 G/DL (ref 3.5–5.2)
ALBUMIN SERPL BCP-MCNC: 3.5 G/DL (ref 3.5–5.2)
ALP SERPL-CCNC: 55 U/L (ref 55–135)
ALP SERPL-CCNC: 56 U/L (ref 55–135)
ALT SERPL W/O P-5'-P-CCNC: 47 U/L (ref 10–44)
ALT SERPL W/O P-5'-P-CCNC: 48 U/L (ref 10–44)
ANION GAP SERPL CALC-SCNC: 12 MMOL/L (ref 8–16)
ANION GAP SERPL CALC-SCNC: 9 MMOL/L (ref 8–16)
AST SERPL-CCNC: 27 U/L (ref 10–40)
AST SERPL-CCNC: 31 U/L (ref 10–40)
BACTERIA #/AREA URNS HPF: ABNORMAL /HPF
BASOPHILS # BLD AUTO: 0.07 K/UL (ref 0–0.2)
BASOPHILS # BLD AUTO: 0.08 K/UL (ref 0–0.2)
BASOPHILS NFR BLD: 0.7 % (ref 0–1.9)
BASOPHILS NFR BLD: 0.7 % (ref 0–1.9)
BILIRUB SERPL-MCNC: 0.9 MG/DL (ref 0.1–1)
BILIRUB SERPL-MCNC: 1 MG/DL (ref 0.1–1)
BILIRUB UR QL STRIP: NEGATIVE
BNP SERPL-MCNC: 1691 PG/ML (ref 0–99)
BSA FOR ECHO PROCEDURE: 2.18 M2
BUN SERPL-MCNC: 19 MG/DL (ref 8–23)
BUN SERPL-MCNC: 20 MG/DL (ref 8–23)
CALCIUM SERPL-MCNC: 9.2 MG/DL (ref 8.7–10.5)
CALCIUM SERPL-MCNC: 9.2 MG/DL (ref 8.7–10.5)
CHLORIDE SERPL-SCNC: 106 MMOL/L (ref 95–110)
CHLORIDE SERPL-SCNC: 106 MMOL/L (ref 95–110)
CLARITY UR: CLEAR
CO2 SERPL-SCNC: 23 MMOL/L (ref 23–29)
CO2 SERPL-SCNC: 25 MMOL/L (ref 23–29)
COLOR UR: YELLOW
CREAT SERPL-MCNC: 0.9 MG/DL (ref 0.5–1.4)
CREAT SERPL-MCNC: 1 MG/DL (ref 0.5–1.4)
CTP QC/QA: YES
DIFFERENTIAL METHOD: ABNORMAL
DIFFERENTIAL METHOD: ABNORMAL
EOSINOPHIL # BLD AUTO: 0.2 K/UL (ref 0–0.5)
EOSINOPHIL # BLD AUTO: 0.3 K/UL (ref 0–0.5)
EOSINOPHIL NFR BLD: 1.8 % (ref 0–8)
EOSINOPHIL NFR BLD: 2.5 % (ref 0–8)
ERYTHROCYTE [DISTWIDTH] IN BLOOD BY AUTOMATED COUNT: 14.4 % (ref 11.5–14.5)
ERYTHROCYTE [DISTWIDTH] IN BLOOD BY AUTOMATED COUNT: 14.6 % (ref 11.5–14.5)
EST. GFR  (NO RACE VARIABLE): >60 ML/MIN/1.73 M^2
EST. GFR  (NO RACE VARIABLE): >60 ML/MIN/1.73 M^2
GLUCOSE SERPL-MCNC: 114 MG/DL (ref 70–110)
GLUCOSE SERPL-MCNC: 129 MG/DL (ref 70–110)
GLUCOSE UR QL STRIP: NEGATIVE
HCT VFR BLD AUTO: 46.3 % (ref 40–54)
HCT VFR BLD AUTO: 48 % (ref 40–54)
HGB BLD-MCNC: 15.9 G/DL (ref 14–18)
HGB BLD-MCNC: 16.1 G/DL (ref 14–18)
HGB UR QL STRIP: NEGATIVE
HYALINE CASTS #/AREA URNS LPF: 11 /LPF
IMM GRANULOCYTES # BLD AUTO: 0.03 K/UL (ref 0–0.04)
IMM GRANULOCYTES # BLD AUTO: 0.03 K/UL (ref 0–0.04)
IMM GRANULOCYTES NFR BLD AUTO: 0.3 % (ref 0–0.5)
IMM GRANULOCYTES NFR BLD AUTO: 0.3 % (ref 0–0.5)
KETONES UR QL STRIP: NEGATIVE
LEUKOCYTE ESTERASE UR QL STRIP: NEGATIVE
LYMPHOCYTES # BLD AUTO: 3.4 K/UL (ref 1–4.8)
LYMPHOCYTES # BLD AUTO: 3.4 K/UL (ref 1–4.8)
LYMPHOCYTES NFR BLD: 30.6 % (ref 18–48)
LYMPHOCYTES NFR BLD: 32.9 % (ref 18–48)
MAGNESIUM SERPL-MCNC: 1.8 MG/DL (ref 1.6–2.6)
MCH RBC QN AUTO: 30.2 PG (ref 27–31)
MCH RBC QN AUTO: 31.1 PG (ref 27–31)
MCHC RBC AUTO-ENTMCNC: 33.1 G/DL (ref 32–36)
MCHC RBC AUTO-ENTMCNC: 34.8 G/DL (ref 32–36)
MCV RBC AUTO: 89 FL (ref 82–98)
MCV RBC AUTO: 91 FL (ref 82–98)
MICROSCOPIC COMMENT: ABNORMAL
MONOCYTES # BLD AUTO: 0.9 K/UL (ref 0.3–1)
MONOCYTES # BLD AUTO: 1 K/UL (ref 0.3–1)
MONOCYTES NFR BLD: 8 % (ref 4–15)
MONOCYTES NFR BLD: 9.2 % (ref 4–15)
NEUTROPHILS # BLD AUTO: 5.7 K/UL (ref 1.8–7.7)
NEUTROPHILS # BLD AUTO: 6.6 K/UL (ref 1.8–7.7)
NEUTROPHILS NFR BLD: 54.4 % (ref 38–73)
NEUTROPHILS NFR BLD: 58.6 % (ref 38–73)
NITRITE UR QL STRIP: NEGATIVE
NRBC BLD-RTO: 0 /100 WBC
NRBC BLD-RTO: 0 /100 WBC
PH UR STRIP: 6 [PH] (ref 5–8)
PLATELET # BLD AUTO: 204 K/UL (ref 150–450)
PLATELET # BLD AUTO: 223 K/UL (ref 150–450)
PMV BLD AUTO: 10.4 FL (ref 9.2–12.9)
PMV BLD AUTO: 10.4 FL (ref 9.2–12.9)
POTASSIUM SERPL-SCNC: 3.5 MMOL/L (ref 3.5–5.1)
POTASSIUM SERPL-SCNC: 3.7 MMOL/L (ref 3.5–5.1)
PROT SERPL-MCNC: 6 G/DL (ref 6–8.4)
PROT SERPL-MCNC: 6.2 G/DL (ref 6–8.4)
PROT UR QL STRIP: ABNORMAL
RBC # BLD AUTO: 5.18 M/UL (ref 4.6–6.2)
RBC # BLD AUTO: 5.27 M/UL (ref 4.6–6.2)
RBC #/AREA URNS HPF: 3 /HPF (ref 0–4)
SARS-COV-2 RDRP RESP QL NAA+PROBE: NEGATIVE
SINUS: 3.1 CM
SODIUM SERPL-SCNC: 140 MMOL/L (ref 136–145)
SODIUM SERPL-SCNC: 141 MMOL/L (ref 136–145)
SP GR UR STRIP: 1.03 (ref 1–1.03)
TROPONIN I SERPL DL<=0.01 NG/ML-MCNC: 0.04 NG/ML (ref 0–0.03)
URN SPEC COLLECT METH UR: ABNORMAL
UROBILINOGEN UR STRIP-ACNC: NEGATIVE EU/DL
WBC # BLD AUTO: 10.45 K/UL (ref 3.9–12.7)
WBC # BLD AUTO: 11.2 K/UL (ref 3.9–12.7)
WBC #/AREA URNS HPF: 4 /HPF (ref 0–5)

## 2022-09-02 PROCEDURE — D9220A PRA ANESTHESIA: Mod: ANES,,, | Performed by: ANESTHESIOLOGY

## 2022-09-02 PROCEDURE — D9220A PRA ANESTHESIA: ICD-10-PCS | Mod: CRNA,,, | Performed by: NURSE ANESTHETIST, CERTIFIED REGISTERED

## 2022-09-02 PROCEDURE — 85025 COMPLETE CBC W/AUTO DIFF WBC: CPT | Performed by: HOSPITALIST

## 2022-09-02 PROCEDURE — 37000009 HC ANESTHESIA EA ADD 15 MINS: Performed by: INTERNAL MEDICINE

## 2022-09-02 PROCEDURE — 93005 ELECTROCARDIOGRAM TRACING: CPT

## 2022-09-02 PROCEDURE — 81000 URINALYSIS NONAUTO W/SCOPE: CPT | Performed by: EMERGENCY MEDICINE

## 2022-09-02 PROCEDURE — S4991 NICOTINE PATCH NONLEGEND: HCPCS | Performed by: HOSPITALIST

## 2022-09-02 PROCEDURE — 93010 EKG 12-LEAD: ICD-10-PCS | Mod: ,,, | Performed by: INTERNAL MEDICINE

## 2022-09-02 PROCEDURE — D9220A PRA ANESTHESIA: Mod: CRNA,,, | Performed by: NURSE ANESTHETIST, CERTIFIED REGISTERED

## 2022-09-02 PROCEDURE — U0002 COVID-19 LAB TEST NON-CDC: HCPCS | Performed by: EMERGENCY MEDICINE

## 2022-09-02 PROCEDURE — 63600175 PHARM REV CODE 636 W HCPCS: Performed by: HOSPITALIST

## 2022-09-02 PROCEDURE — 63600175 PHARM REV CODE 636 W HCPCS: Performed by: EMERGENCY MEDICINE

## 2022-09-02 PROCEDURE — 37000008 HC ANESTHESIA 1ST 15 MINUTES: Performed by: INTERNAL MEDICINE

## 2022-09-02 PROCEDURE — 85025 COMPLETE CBC W/AUTO DIFF WBC: CPT | Mod: 91 | Performed by: EMERGENCY MEDICINE

## 2022-09-02 PROCEDURE — 25000003 PHARM REV CODE 250: Performed by: EMERGENCY MEDICINE

## 2022-09-02 PROCEDURE — 25000003 PHARM REV CODE 250: Performed by: NURSE ANESTHETIST, CERTIFIED REGISTERED

## 2022-09-02 PROCEDURE — 99291 CRITICAL CARE FIRST HOUR: CPT | Mod: ,,, | Performed by: INTERNAL MEDICINE

## 2022-09-02 PROCEDURE — 99291 PR CRITICAL CARE, E/M 30-74 MINUTES: ICD-10-PCS | Mod: ,,, | Performed by: INTERNAL MEDICINE

## 2022-09-02 PROCEDURE — 25000003 PHARM REV CODE 250: Performed by: HOSPITALIST

## 2022-09-02 PROCEDURE — 20000000 HC ICU ROOM

## 2022-09-02 PROCEDURE — 83880 ASSAY OF NATRIURETIC PEPTIDE: CPT | Performed by: EMERGENCY MEDICINE

## 2022-09-02 PROCEDURE — 94761 N-INVAS EAR/PLS OXIMETRY MLT: CPT

## 2022-09-02 PROCEDURE — D9220A PRA ANESTHESIA: ICD-10-PCS | Mod: ANES,,, | Performed by: ANESTHESIOLOGY

## 2022-09-02 PROCEDURE — 84484 ASSAY OF TROPONIN QUANT: CPT | Performed by: EMERGENCY MEDICINE

## 2022-09-02 PROCEDURE — 25000003 PHARM REV CODE 250: Performed by: INTERNAL MEDICINE

## 2022-09-02 PROCEDURE — 80053 COMPREHEN METABOLIC PANEL: CPT | Performed by: EMERGENCY MEDICINE

## 2022-09-02 PROCEDURE — 83735 ASSAY OF MAGNESIUM: CPT | Performed by: EMERGENCY MEDICINE

## 2022-09-02 PROCEDURE — 63600175 PHARM REV CODE 636 W HCPCS: Performed by: NURSE ANESTHETIST, CERTIFIED REGISTERED

## 2022-09-02 PROCEDURE — 80053 COMPREHEN METABOLIC PANEL: CPT | Mod: 91 | Performed by: HOSPITALIST

## 2022-09-02 PROCEDURE — 93010 ELECTROCARDIOGRAM REPORT: CPT | Mod: ,,, | Performed by: INTERNAL MEDICINE

## 2022-09-02 RX ORDER — AMIODARONE HYDROCHLORIDE 200 MG/1
200 TABLET ORAL DAILY
Status: DISCONTINUED | OUTPATIENT
Start: 2022-09-17 | End: 2022-09-02

## 2022-09-02 RX ORDER — ACETAMINOPHEN 325 MG/1
650 TABLET ORAL EVERY 4 HOURS PRN
Status: DISCONTINUED | OUTPATIENT
Start: 2022-09-02 | End: 2022-09-02 | Stop reason: HOSPADM

## 2022-09-02 RX ORDER — FAMOTIDINE 20 MG/1
20 TABLET, FILM COATED ORAL 2 TIMES DAILY
Status: DISCONTINUED | OUTPATIENT
Start: 2022-09-02 | End: 2022-09-02 | Stop reason: HOSPADM

## 2022-09-02 RX ORDER — PROMETHAZINE HYDROCHLORIDE 25 MG/1
25 TABLET ORAL EVERY 6 HOURS PRN
Status: DISCONTINUED | OUTPATIENT
Start: 2022-09-02 | End: 2022-09-02 | Stop reason: HOSPADM

## 2022-09-02 RX ORDER — ASPIRIN 325 MG
325 TABLET ORAL
Status: COMPLETED | OUTPATIENT
Start: 2022-09-02 | End: 2022-09-02

## 2022-09-02 RX ORDER — HYDROCODONE BITARTRATE AND ACETAMINOPHEN 5; 325 MG/1; MG/1
1 TABLET ORAL EVERY 4 HOURS PRN
Status: DISCONTINUED | OUTPATIENT
Start: 2022-09-02 | End: 2022-09-02 | Stop reason: HOSPADM

## 2022-09-02 RX ORDER — AMIODARONE HYDROCHLORIDE 200 MG/1
200 TABLET ORAL 2 TIMES DAILY
Status: DISCONTINUED | OUTPATIENT
Start: 2022-09-09 | End: 2022-09-02

## 2022-09-02 RX ORDER — AMIODARONE HYDROCHLORIDE 200 MG/1
400 TABLET ORAL 2 TIMES DAILY
Status: DISCONTINUED | OUTPATIENT
Start: 2022-09-02 | End: 2022-09-02 | Stop reason: HOSPADM

## 2022-09-02 RX ORDER — AMIODARONE HYDROCHLORIDE 200 MG/1
200 TABLET ORAL 2 TIMES DAILY
Qty: 28 TABLET | Refills: 0 | Status: SHIPPED | OUTPATIENT
Start: 2022-09-16 | End: 2022-09-30

## 2022-09-02 RX ORDER — ETOMIDATE 2 MG/ML
INJECTION INTRAVENOUS
Status: DISCONTINUED | OUTPATIENT
Start: 2022-09-02 | End: 2022-09-02

## 2022-09-02 RX ORDER — AMIODARONE HYDROCHLORIDE 200 MG/1
400 TABLET ORAL 2 TIMES DAILY
Status: DISCONTINUED | OUTPATIENT
Start: 2022-09-02 | End: 2022-09-02

## 2022-09-02 RX ORDER — SODIUM CHLORIDE 0.9 % (FLUSH) 0.9 %
10 SYRINGE (ML) INJECTION
Status: DISCONTINUED | OUTPATIENT
Start: 2022-09-02 | End: 2022-09-02 | Stop reason: HOSPADM

## 2022-09-02 RX ORDER — AMIODARONE HYDROCHLORIDE 200 MG/1
200 TABLET ORAL DAILY
Status: DISCONTINUED | OUTPATIENT
Start: 2022-09-16 | End: 2022-09-02 | Stop reason: HOSPADM

## 2022-09-02 RX ORDER — AMIODARONE HYDROCHLORIDE 200 MG/1
200 TABLET ORAL 2 TIMES DAILY
Status: DISCONTINUED | OUTPATIENT
Start: 2022-09-09 | End: 2022-09-02 | Stop reason: HOSPADM

## 2022-09-02 RX ORDER — FUROSEMIDE 40 MG/1
40 TABLET ORAL 2 TIMES DAILY
Qty: 60 TABLET | Refills: 0 | Status: SHIPPED | OUTPATIENT
Start: 2022-09-02 | End: 2022-10-02

## 2022-09-02 RX ORDER — AMIODARONE HYDROCHLORIDE 400 MG/1
400 TABLET ORAL 2 TIMES DAILY
Qty: 28 TABLET | Refills: 0 | Status: SHIPPED | OUTPATIENT
Start: 2022-09-02 | End: 2022-09-16

## 2022-09-02 RX ORDER — ENOXAPARIN SODIUM 150 MG/ML
1 INJECTION SUBCUTANEOUS
Status: COMPLETED | OUTPATIENT
Start: 2022-09-02 | End: 2022-09-02

## 2022-09-02 RX ORDER — MORPHINE SULFATE 4 MG/ML
2 INJECTION, SOLUTION INTRAMUSCULAR; INTRAVENOUS EVERY 4 HOURS PRN
Status: DISCONTINUED | OUTPATIENT
Start: 2022-09-02 | End: 2022-09-02 | Stop reason: HOSPADM

## 2022-09-02 RX ORDER — FUROSEMIDE 40 MG/1
40 TABLET ORAL 2 TIMES DAILY
Status: DISCONTINUED | OUTPATIENT
Start: 2022-09-02 | End: 2022-09-02 | Stop reason: HOSPADM

## 2022-09-02 RX ORDER — ONDANSETRON 2 MG/ML
4 INJECTION INTRAMUSCULAR; INTRAVENOUS EVERY 8 HOURS PRN
Status: DISCONTINUED | OUTPATIENT
Start: 2022-09-02 | End: 2022-09-02 | Stop reason: HOSPADM

## 2022-09-02 RX ORDER — AMIODARONE HYDROCHLORIDE 200 MG/1
200 TABLET ORAL DAILY
Qty: 58 TABLET | Refills: 0 | Status: SHIPPED | OUTPATIENT
Start: 2022-09-30 | End: 2022-10-30

## 2022-09-02 RX ORDER — IBUPROFEN 200 MG
1 TABLET ORAL DAILY
Status: DISCONTINUED | OUTPATIENT
Start: 2022-09-02 | End: 2022-09-02 | Stop reason: HOSPADM

## 2022-09-02 RX ADMIN — SODIUM CHLORIDE, SODIUM LACTATE, POTASSIUM CHLORIDE, AND CALCIUM CHLORIDE: .6; .31; .03; .02 INJECTION, SOLUTION INTRAVENOUS at 01:09

## 2022-09-02 RX ADMIN — ENOXAPARIN SODIUM 105 MG: 150 INJECTION SUBCUTANEOUS at 02:09

## 2022-09-02 RX ADMIN — AMIODARONE HYDROCHLORIDE 150 MG: 1.5 INJECTION, SOLUTION INTRAVENOUS at 12:09

## 2022-09-02 RX ADMIN — RIVAROXABAN 20 MG: 20 TABLET, FILM COATED ORAL at 04:09

## 2022-09-02 RX ADMIN — FUROSEMIDE 40 MG: 40 TABLET ORAL at 08:09

## 2022-09-02 RX ADMIN — ETOMIDATE 12 MG: 2 INJECTION, SOLUTION INTRAVENOUS at 01:09

## 2022-09-02 RX ADMIN — FAMOTIDINE 20 MG: 20 TABLET ORAL at 08:09

## 2022-09-02 RX ADMIN — ASPIRIN 325 MG ORAL TABLET 325 MG: 325 PILL ORAL at 02:09

## 2022-09-02 RX ADMIN — AMIODARONE HYDROCHLORIDE 400 MG: 200 TABLET ORAL at 03:09

## 2022-09-02 RX ADMIN — BENZOCAINE 2 EACH: 200 SPRAY DENTAL; ORAL; PERIODONTAL at 01:09

## 2022-09-02 RX ADMIN — NICOTINE 1 PATCH: 21 PATCH, EXTENDED RELEASE TRANSDERMAL at 10:09

## 2022-09-02 RX ADMIN — AMIODARONE HYDROCHLORIDE 1 MG/MIN: 1.8 INJECTION, SOLUTION INTRAVENOUS at 01:09

## 2022-09-02 RX ADMIN — ONDANSETRON 4 MG: 2 INJECTION INTRAMUSCULAR; INTRAVENOUS at 02:09

## 2022-09-02 RX ADMIN — AMIODARONE HYDROCHLORIDE 0.5 MG/MIN: 1.8 INJECTION, SOLUTION INTRAVENOUS at 06:09

## 2022-09-02 NOTE — TRANSFER OF CARE
"Anesthesia Transfer of Care Note    Patient: Timmy Wade    Procedure(s) Performed: Procedure(s) (LRB):  Transesophageal echo (NADIYA) intra-procedure log documentation (N/A)  Cardioversion or Defibrillation (N/A)    Patient location: ICU    Anesthesia Type: general and MAC    Transport from OR: Transported from OR on 2-3 L/min O2 by NC with adequate spontaneous ventilation    Post pain: adequate analgesia    Post assessment: no apparent anesthetic complications and tolerated procedure well    Post vital signs: stable    Level of consciousness: lethargic and responds to stimulation    Nausea/Vomiting: no nausea/vomiting    Complications: none    Transfer of care protocol was followed      Last vitals:   Visit Vitals  BP (!) 133/90 (BP Location: Right arm, Patient Position: Lying)   Pulse 100   Temp 36.2 °C (97.2 °F) (Skin)   Resp 19   Ht 5' 6" (1.676 m)   Wt 102.2 kg (225 lb 6.4 oz)   SpO2 99%   BMI 36.38 kg/m²     "

## 2022-09-02 NOTE — HPI
Timmy Wade is a 61 y.o. male with a PMH  has a past medical history of Anticoagulant long-term use, Anxiety, Atrial fibrillation, CHF (congestive heart failure), and Chronic back pain. who presented to the ED complaining of acute onset palpitations with associated shortness of breath and abdominal bloating x1 day duration.  Patient has recurrent admissions for symptomatic atrial fibrillation resulting in patient being admitted to ICU for rate/rhythm control and cardioversion with last admission back in may of this year. He reported non-compliance with his home medications due to side affects and decreased frequency of flares but reported he may be getting to the point where he needs to start taking his medications vs undergoing possible ablation. He reported his flares occur sporadically with no known alleviating or aggravating factors and reports being asymptomatic currently. Patient was initiated on amiodarone drip and admitted to the ICU for continued monitoring while awaiting further evaluation/treatment by cardiology in the morning.     PCP: Robert Bravo

## 2022-09-02 NOTE — ASSESSMENT & PLAN NOTE
Hx PAF  Nonadherent with med rx, unclear which of his meds is the problem  Will plan NADIYA/DCCV today  Cont amio po as outpatient  Start Xarelto 20mg qhs  Consider EPS eval for alternative AAD vs ablation  Presumed tachymyopathy, will need repeat echo in 3 months for reassessment of LVEF after SR restored.    Risks, benefits and alternatives of the NADIYA/Cardioversion procedure were discussed with the patient including throat irritation, aspiration, anesthetic complications, esophageal irritation/perforation, skin irritation, arrhythmia, etc.  Patient understands and agrees to proceed.  Permits signed.

## 2022-09-02 NOTE — CARE UPDATE
[facility]  ICU Shift Summary  Date: 9/2/2022      Prehospitalization: Home  Admit Date / LOS : 9/1/2022/ 0 days    Diagnosis:   Atrial fibrillation with RVR    Consults:        Active: Cardio       Needed: N/A     Code Status: Full Code   Advanced Directive: <no information>    LDA:  Lines/Drains/Airways       Peripheral Intravenous Line  Duration                  Peripheral IV - Single Lumen 09/02/22 0057 20 G Anterior;Distal;Right Forearm <1 day         Peripheral IV - Single Lumen 09/02/22 0225 20 G Anterior;Distal;Right Upper Arm <1 day                  Central Lines/Site/Justification:Patient Does Not Have Central Line  Urinary Cath/Order/Justification:Patient Does Not Have Urinary Catheter    Vasopressors/Infusions:    amiodarone in dextrose 5% 1 mg/min (09/02/22 0400)    amiodarone in dextrose 5%            GOALS: Volume/ Hemodynamic: N/A                     RASS: 0  alert and calm    Pain Management: none       Pain Controlled: not applicable     Rhythm: A-Fib    Respiratory Device: Room Air                  Most Recent SBT/ SAT: N/A       MOVE Screen: PASS  ICU Liberation: no    VTE Prophylaxis: Pharm  Mobility: Ambulatory  Stress Ulcer Prophylaxis: No    Isolation: No active isolations    Dietary:   Current Diet Order   Procedures    Diet NPO      Tolerance: not applicable  Advancement: no    I & O (24h):    Intake/Output Summary (Last 24 hours) at 9/2/2022 0433  Last data filed at 9/2/2022 0400  Gross per 24 hour   Intake 95.8 ml   Output --   Net 95.8 ml        Restraints: No    Significant Dates:  Post Op Date: N/A  Rescue Date: N/A  Imaging/ Diagnostics: N/A    Noteworthy Labs:  see labs below    COVID Test: (--)  CBC/Anemia Labs: Coags:    Recent Labs   Lab 09/02/22  0033 09/02/22 0403   WBC 10.45 11.20   HGB 16.1 15.9   HCT 46.3 48.0    223   MCV 89 91   RDW 14.4 14.6*    No results for input(s): PT, INR, APTT in the last 168 hours.     Chemistries:   Recent Labs   Lab  09/02/22  0033      K 3.5      CO2 23   BUN 20   CREATININE 0.9   CALCIUM 9.2   PROT 6.0   BILITOT 1.0   ALKPHOS 55   ALT 48*   AST 31   MG 1.8        Cardiac Enzymes: Ejection Fractions:    Recent Labs     09/02/22  0033   TROPONINI 0.044*    EF   Date Value Ref Range Status   05/18/2022 20 % Final        POCT Glucose: HbA1c:    No results for input(s): POCTGLUCOSE in the last 168 hours. Hemoglobin A1C   Date Value Ref Range Status   05/17/2022 5.7 (H) 4.0 - 5.6 % Final     Comment:     ADA Screening Guidelines:  5.7-6.4%  Consistent with prediabetes  >or=6.5%  Consistent with diabetes    High levels of fetal hemoglobin interfere with the HbA1C  assay. Heterozygous hemoglobin variants (HbS, HgC, etc)do  not significantly interfere with this assay.   However, presence of multiple variants may affect accuracy.             ICU LOS 1h  Level of Care: Critical Care    Chart Check: 24 HR Done  Shift Summary/Plan for the shift: Amiodarone drip to correct a-fib

## 2022-09-02 NOTE — ED PROVIDER NOTES
"Encounter Date: 9/1/2022    SCRIBE #1 NOTE: I, Leila Woodruff, am scribing for, and in the presence of,  Stefanie Doyle MD. I have scribed the following portions of the note - Other sections scribed: HPI, ROS, PE.     History     Chief Complaint   Patient presents with    Palpitations     Reports feeling like he is in Afib and has nausea also.     Timmy Wade is a 61 y.o. male, with a past medical history of A-Fib, HFrEF (last EF 20% 5/2022), and Anxiety, who presents to the ED with palpitations onset while walking to the store prior to arrival today. Patient notes associated symptoms of abdominal bloating. No exacerbating or alleviating factors. He reports that his current symptoms are consistent with his prior episodes of A-Fib. Patient states that his episodes of A-Fib have been becoming less frequent (3x/month to 1x/4 months). He endorses being followed by a Cardiologist located in [place], [place]. He denies compliance with medications prescribed for his A-Fib or blood thinners as they cause him to become "agitated" and "chew his stomach up." He reports that he can only take these medications therapeutically for "4 days at a time" Patient reports swimming for 1 hour this morning without symptoms. Patient endorses tobacco use.  Patient denies shortness of breath, dizziness, chest pain, leg swelling, or other associated symptoms.     Patient is an allergy noted to calcium channel blockers.  On chart review, the patient did not tolerate metroprolol in the past.      The history is provided by the patient.   Review of patient's allergies indicates:   Allergen Reactions    Corticosteroids (glucocorticoids) Palpitations and Anaphylaxis    Eggs [egg derived] Anaphylaxis    Eggshell membrane Anaphylaxis    Calcium channel blocking agent diltiazem analogues Other (See Comments)    Cephalexin      Anaphylaxis^    Lorazepam Other (See Comments)     nausea  Heart RACING     Past Medical History:   Diagnosis Date "    Anticoagulant long-term use     Anxiety     Atrial fibrillation     CHF (congestive heart failure)     Chronic back pain      Past Surgical History:   Procedure Laterality Date    APPENDECTOMY      CHOLECYSTECTOMY      HERNIA REPAIR      NASAL SEPTUM SURGERY      RECTAL SURGERY      TRANSESOPHAGEAL ECHOCARDIOGRAPHY N/A 2021    Procedure: ECHOCARDIOGRAM, TRANSESOPHAGEAL;  Surgeon: Mikey Nicole MD;  Location: [facility] EP LAB;  Service: Cardiology;  Laterality: N/A;    TREATMENT OF CARDIAC ARRHYTHMIA N/A 2021    Procedure: CARDIOVERSION;  Surgeon: Jasbir Evans MD;  Location: [facility] EP LAB;  Service: Cardiology;  Laterality: N/A;  AF, NADIYA/DCCV, DM, ANes, 905    TREATMENT OF CARDIAC ARRHYTHMIA N/A 2021    Procedure: Cardioversion or Defibrillation;  Surgeon: Jasbir Evans MD;  Location: [facility] EP LAB;  Service: Cardiology;  Laterality: N/A;  AF, NADIYA, DCCV, MAC, DM, 3 Prep     Family History   Problem Relation Age of Onset    COPD Mother     Dementia Father      Social History     Tobacco Use    Smoking status: Former     Packs/day: 0.50     Types: Cigarettes     Quit date: 2010     Years since quittin.7    Smokeless tobacco: Never    Tobacco comments:     using nicotine patch   Substance Use Topics    Alcohol use: No    Drug use: No     Review of Systems   Constitutional:  Negative for chills and fever.   HENT:  Negative for congestion and sore throat.    Eyes:  Negative for visual disturbance.   Respiratory:  Negative for cough and shortness of breath.    Cardiovascular:  Positive for palpitations. Negative for chest pain and leg swelling.   Gastrointestinal:  Negative for abdominal pain, nausea and vomiting.        Positive for abdominal bloating.   Genitourinary:  Negative for dysuria.   Skin:  Negative for rash.   Neurological:  Negative for dizziness and headaches.   Psychiatric/Behavioral:  Negative for confusion.      Physical Exam     Initial Vitals [22 2357]   BP Pulse Resp  Temp SpO2   (!) 160/104 (!) 133 16 97.8 °F (36.6 °C) 97 %      MAP       --         Physical Exam    Nursing note and vitals reviewed.  Constitutional: He appears well-developed and well-nourished. He is not diaphoretic. No distress.   Body mass index is 37.75 kg/m².     HENT:   Head: Normocephalic and atraumatic.   Eyes: Conjunctivae are normal. Pupils are equal, round, and reactive to light.   Neck: Neck supple.   Cardiovascular:  An irregularly irregular rhythm present.   Tachycardia present.         Pulmonary/Chest: Breath sounds normal. No respiratory distress. He has no wheezes. He has no rales.   Abdominal: Abdomen is soft. Bowel sounds are normal.   Musculoskeletal:         General: Edema (trace pedal) present.      Cervical back: Neck supple.     Neurological: He is alert and oriented to person, place, and time.   Skin: Skin is warm and dry.   Psychiatric: He has a normal mood and affect.       ED Course   Critical Care    Date/Time: 9/2/2022 1:53 AM  Performed by: Stefanie Doyle MD  Authorized by: Stefanie Doyle MD   Total critical care time (exclusive of procedural time) : 0 minutes  Comments: Please put in 45 minutes of critical care due to patient having a high risk of cardiac failure.   Separate from teaching and exclusive of procedure and ekg time  Includes:  Time at bedside  Time reviewing test results  Time discussing case with staff  Time documenting the medical record  Time spent with consults  Management          Labs Reviewed   B-TYPE NATRIURETIC PEPTIDE - Abnormal; Notable for the following components:       Result Value    BNP 1,691 (*)     All other components within normal limits   CBC W/ AUTO DIFFERENTIAL - Abnormal; Notable for the following components:    MCH 31.1 (*)     All other components within normal limits   COMPREHENSIVE METABOLIC PANEL - Abnormal; Notable for the following components:    Glucose 129 (*)     Albumin 3.3 (*)     ALT 48 (*)     All other components within  normal limits   TROPONIN I - Abnormal; Notable for the following components:    Troponin I 0.044 (*)     All other components within normal limits   URINALYSIS, REFLEX TO URINE CULTURE - Abnormal; Notable for the following components:    Protein, UA 2+ (*)     All other components within normal limits    Narrative:     Specimen Source->Urine   URINALYSIS MICROSCOPIC - Abnormal; Notable for the following components:    Hyaline Casts, UA 11 (*)     All other components within normal limits    Narrative:     Specimen Source->Urine   MAGNESIUM   SARS-COV-2 RDRP GENE     EKG Readings: (Independently Interpreted)   Atrial fibrillation with RVR, right bundle branch block present, no STEMI.  Ventricular rate 148 beats per minute.     Imaging Results              X-Ray Chest AP Portable (Final result)  Result time 09/02/22 01:10:15      Final result by Oseas Landis MD (09/02/22 01:10:15)                   Impression:      Mild pattern of interstitial infiltrate/edema bilaterally, as well as suspected mild superimposed infiltrate at the left base.      Electronically signed by: Oseas aLndis  Date:    09/02/2022  Time:    01:10               Narrative:    EXAMINATION:  XR CHEST AP PORTABLE    CLINICAL HISTORY:  tachycardia;    TECHNIQUE:  Single frontal view of the chest was performed.    COMPARISON:  Chest radiograph May 17, 2022    FINDINGS:  Single portable chest view is submitted.  There is mild diminished depth of inspiration and mild rotation, when accounting for these factors the cardiomediastinal silhouette appears stable.    Accentuation of pulmonary bronchovascular markings in part due to diminished depth of inspiration noted, however there is appearance of may relate to superimposed pattern of interstitial edema/infiltrate.  Mild greater opacity at the left lung base likely in part is related to overlying soft tissue attenuation and the cardiac silhouette however there may be mild superimposed  infiltrate.    There is no evidence for significant pleural effusion or pneumothorax.  The osseous structures appear intact.                                       Medications   amiodarone 360 mg/200 mL (1.8 mg/mL) infusion (1 mg/min Intravenous New Bag 9/2/22 0107)   enoxaparin injection 105 mg (has no administration in time range)   aspirin tablet 325 mg (has no administration in time range)   amiodarone in dextrose 150 mg/100 mL (1.5 mg/mL) loading dose 150 mg (0 mg Intravenous Stopped 9/2/22 0123)     Medical Decision Making:   History:   Old Medical Records: I decided to obtain old medical records.  Initial Assessment:   61-year-old male with history of atrial fibrillation, CHF presents with palpitations.  Symptoms started this evening.  He is not adherent to medications.  He is not currently on any blood thinners or rate-controlling medicines.  On exam, the patient is alert, conversant, he is tachycardic with an irregularly irregular rhythm with a rate in the 140-150 range.  He has trace pedal edema.  Differential includes but not limited to AFib with RVR, electrolyte disturbance.  Do not suspect PE, patient has no chest pain or shortness of breath.  Workup initiated with labs including cardiac enzymes, chest x-ray.  Starting amiodarone, patient has an allergy reported to calcium channel blockers and has not tolerated beta blockers in the past per review of notes.  Independently Interpreted Test(s):   I have ordered and independently interpreted EKG Reading(s) - see prior notes  Clinical Tests:   Lab Tests: Ordered and Reviewed  Radiological Study: Ordered and Reviewed  Medical Tests: Ordered and Reviewed        Scribe Attestation:   Scribe #1: I performed the above scribed service and the documentation accurately describes the services I performed. I attest to the accuracy of the note.      ED Course as of 09/02/22 0229   Fri Sep 02, 2022   0148 Patient's chest x-ray shows findings consistent with pulmonary  edema, possible mild infiltrate at the left lower base.  I do not suspect pneumonia, patient has no fever, chest pain, shortness of breath, he has no oxygen requirement.  Will defer Lasix use to inpatient team.  Troponin is mildly elevated, will give p.o. aspirin.  I suspect this is related to demand ischemia.  Renal function within normal limits, will give Lovenox.  Will keep NPO for possible cardioversion in the morning. [LH]      ED Course User Index  [LH] Stefanie Doyle MD             Clinical Impression:   Final diagnoses:  [R00.2] Palpitations  [I48.91] Atrial fibrillation with RVR (Primary)  [R77.8] Elevated troponin  [I50.9] Congestive heart failure, unspecified HF chronicity, unspecified heart failure type        ED Disposition Condition    Admit         I, Stefanie Doyle MD, personally performed the services described in this documentation. All medical record entries made by the scribe were at my direction and in my presence. I have reviewed the chart and agree that the record reflects my personal performance and is accurate and complete.    This dictation has been generated using M-Modal Fluency Direct dictation; some phonetic errors may occur.             Stefanie Doyle MD  09/02/22 6213

## 2022-09-02 NOTE — BRIEF OP NOTE
[facility] - Cath Lab  Brief Operative Note     SUMMARY     Surgery Date: 9/2/2022     Surgeon(s) and Role:     * Ramon Rodríguez MD - Primary    Assisting Surgeon: None    Pre-op Diagnosis:  Paroxysmal atrial fibrillation with RVR [I48.0]    Post-op Diagnosis:  Post-Op Diagnosis Codes:     * Paroxysmal atrial fibrillation with RVR [I48.0]    Procedure(s) (LRB):  Transesophageal echo (NADIYA) intra-procedure log documentation (N/A)  Cardioversion or Defibrillation (N/A)    Anesthesia: Monitor Anesthesia Care    Description of the findings of the procedure: uneventful NADIYA/DCCV with anesthesia    Findings/Key Components:   Severe LV systolic dysfunction, EF 20%  Global hypokinesis.  Normal RV size/fxn  Biatrial enlargement  No LA/KELLY thrombus.  Normal valves  Mild MR/TR    Successful DCCV AF->SR 85 BPM 200J x1    Plan:  Start amio PO load, stop IV  Start Xarelto 20mg qhs  Dispo planning appropriate  Follow up with Dr. Rodríguez +/- EPS.  Repeat echo in 3 months for reassessment of LVEF and need for further ischemic eval.    Estimated Blood Loss: none         Specimens: None    Diet: cardiac    Activity: ad olga.

## 2022-09-02 NOTE — HPI
61 y.o. male with a PMH  has a past medical history of Anticoagulant long-term use, Anxiety, Atrial fibrillation, CHF (congestive heart failure), and Chronic back pain. who presented to the ED complaining of acute onset palpitations with associated shortness of breath and abdominal bloating x1 day duration.  Patient has recurrent admissions for symptomatic atrial fibrillation resulting in patient being admitted to ICU for rate/rhythm control and cardioversion with last admission back in may of this year. He reported non-compliance with his home medications due to side affects and decreased frequency of flares but reported he may be getting to the point where he needs to start taking his medications vs undergoing possible ablation. He reported his flares occur sporadically with no known alleviating or aggravating factors and reports being asymptomatic currently. Patient was initiated on amiodarone drip and admitted to the ICU for continued monitoring while awaiting further evaluation/treatment by cardiology in the morning.     Cardiology consulted for AF/RVR and CHF.    Patient previously known to me with a history of atrial fibrillation and heart failure.  He is also seen by electrophysiology in the past (Dr. Evans).  He presents with complaints of palpitations.  He denies any angina or dyspnea.  He is found to be in atrial fibrillation with rapid ventricular response.  He has not been compliant with his medications and states he has intolerant of them.  I explained to him the need to continue taking his medications in order to prevent his paroxysmal atrial fibrillation and these associated symptoms.

## 2022-09-02 NOTE — ASSESSMENT & PLAN NOTE
Patient reports smoking approximately 1-2 pack of cigarettes daily with no thoughts of cutting back currently. Patient educated on morbidity and mortality in regards to continuation of smoking and stressed importance of cessation. Patient currently declined nicotine patch at time of admission but will be available at patient's request.  Plan:  -Nicotine patch available at patient's request

## 2022-09-02 NOTE — CARE UPDATE
Patient has been seen and examinated,patient with known history of Afib,is admitted for Afib with RVR,has been  stared on amiodarone drip,cardiology started on OAC with plan for DCCV today.

## 2022-09-02 NOTE — SUBJECTIVE & OBJECTIVE
Past Medical History:   Diagnosis Date    Anticoagulant long-term use     Anxiety     Atrial fibrillation     CHF (congestive heart failure)     Chronic back pain        Past Surgical History:   Procedure Laterality Date    APPENDECTOMY      CHOLECYSTECTOMY      HERNIA REPAIR      NASAL SEPTUM SURGERY      RECTAL SURGERY      TRANSESOPHAGEAL ECHOCARDIOGRAPHY N/A 8/26/2021    Procedure: ECHOCARDIOGRAM, TRANSESOPHAGEAL;  Surgeon: Mikey Nicole MD;  Location: [facility] EP LAB;  Service: Cardiology;  Laterality: N/A;    TREATMENT OF CARDIAC ARRHYTHMIA N/A 8/26/2021    Procedure: CARDIOVERSION;  Surgeon: Jasbir Evans MD;  Location: [facility] EP LAB;  Service: Cardiology;  Laterality: N/A;  AF, NADIYA/DCCV, DM, ANes, 905    TREATMENT OF CARDIAC ARRHYTHMIA N/A 9/22/2021    Procedure: Cardioversion or Defibrillation;  Surgeon: Jasbir Evans MD;  Location: [facility] EP LAB;  Service: Cardiology;  Laterality: N/A;  AF, NADIYA, DCCV, MAC, DM, 3 Prep       Review of patient's allergies indicates:   Allergen Reactions    Corticosteroids (glucocorticoids) Palpitations and Anaphylaxis    Eggs [egg derived] Anaphylaxis    Eggshell membrane Anaphylaxis    Calcium channel blocking agent diltiazem analogues Other (See Comments)    Cephalexin      Anaphylaxis^    Lorazepam Other (See Comments)     nausea  Heart RACING       No current facility-administered medications on file prior to encounter.     Current Outpatient Medications on File Prior to Encounter   Medication Sig    amiodarone (PACERONE) 200 MG Tab Take 1 tablet (200 mg total) by mouth 2 (two) times daily.    carvediloL (COREG) 6.25 MG tablet Take 1 tablet (6.25 mg total) by mouth 2 (two) times daily.    nicotine (NICODERM CQ) 14 mg/24 hr Place 1 patch onto the skin once daily.    traMADoL (ULTRAM) 50 mg tablet Take 1 tablet by mouth every 6 hours as needed for pain for up to 5 days    [DISCONTINUED] furosemide (LASIX) 40 MG tablet Take 1 tablet (40 mg total) by mouth once daily.  (Patient not taking: Reported on 2022)    [DISCONTINUED] miconazole NITRATE 2 % (MICOTIN) 2 % top powder Apply topically 2 (two) times a day. Apply to groin area for 4 weeks. (Patient not taking: Reported on 2022)     Family History       Problem Relation (Age of Onset)    COPD Mother    Dementia Father          Tobacco Use    Smoking status: Former     Packs/day: 0.50     Types: Cigarettes     Quit date: 2010     Years since quittin.7    Smokeless tobacco: Never    Tobacco comments:     using nicotine patch   Substance and Sexual Activity    Alcohol use: No    Drug use: No    Sexual activity: Never     Review of Systems   All other systems reviewed and are negative.  Objective:     Vital Signs (Most Recent):  Temp: 98 °F (36.7 °C) (22 0200)  Pulse: (!) 116 (22)  Resp: (!) 24 (22)  BP: 122/84 (22)  SpO2: (!) 93 % (22)   Vital Signs (24h Range):  Temp:  [97.8 °F (36.6 °C)-98 °F (36.7 °C)] 98 °F (36.7 °C)  Pulse:  [116-133] 116  Resp:  [16-25] 24  SpO2:  [93 %-97 %] 93 %  BP: (114-160)/() 122/84     Weight: 106.1 kg (233 lb 14.5 oz)  Body mass index is 37.75 kg/m².    Physical Exam  Constitutional:       General: He is not in acute distress.     Appearance: Normal appearance. He is obese. He is not ill-appearing, toxic-appearing or diaphoretic.   HENT:      Head: Normocephalic and atraumatic.      Right Ear: External ear normal.      Left Ear: External ear normal.      Nose: Nose normal. No congestion or rhinorrhea.      Mouth/Throat:      Mouth: Mucous membranes are moist.      Pharynx: Oropharynx is clear. No oropharyngeal exudate or posterior oropharyngeal erythema.   Eyes:      General: No scleral icterus.     Extraocular Movements: Extraocular movements intact.      Conjunctiva/sclera: Conjunctivae normal.      Pupils: Pupils are equal, round, and reactive to light.   Neck:      Vascular: No carotid bruit.   Cardiovascular:      Rate and  Rhythm: Tachycardia present. Rhythm irregular.      Pulses: Normal pulses.      Heart sounds: Normal heart sounds. No murmur heard.    No friction rub. No gallop.   Pulmonary:      Effort: Pulmonary effort is normal. No respiratory distress.      Breath sounds: Normal breath sounds. No stridor. No wheezing, rhonchi or rales.   Chest:      Chest wall: No tenderness.   Abdominal:      General: Abdomen is flat. Bowel sounds are normal. There is no distension.      Palpations: Abdomen is soft. There is no mass.      Tenderness: There is no abdominal tenderness. There is no guarding or rebound.      Hernia: No hernia is present.   Musculoskeletal:         General: No swelling, tenderness, deformity or signs of injury. Normal range of motion.      Cervical back: Normal range of motion and neck supple. No rigidity or tenderness.      Right lower leg: Edema present.      Left lower leg: Edema present.   Lymphadenopathy:      Cervical: No cervical adenopathy.   Skin:     General: Skin is warm and dry.      Capillary Refill: Capillary refill takes less than 2 seconds.      Coloration: Skin is not jaundiced or pale.      Findings: No bruising, erythema, lesion or rash.   Neurological:      General: No focal deficit present.      Mental Status: He is alert and oriented to person, place, and time. Mental status is at baseline.      Cranial Nerves: No cranial nerve deficit.      Sensory: No sensory deficit.      Motor: No weakness.      Coordination: Coordination normal.   Psychiatric:         Mood and Affect: Mood normal.         Behavior: Behavior normal.         Thought Content: Thought content normal.         Judgment: Judgment normal.         CRANIAL NERVES     CN III, IV, VI   Pupils are equal, round, and reactive to light.     Significant Labs: All pertinent labs within the past 24 hours have been reviewed.    Significant Imaging: I have reviewed all pertinent imaging results/findings within the past 24 hours.

## 2022-09-02 NOTE — HOSPITAL COURSE
patient with known history of Afib,was not on OAC,was  admitted for Afib with RVR,was started  on amiodarone drip,cardiology started on OAC and had cardioversion ,converted to sinus,amiodarone was switched to tapped PO amiodarone,patient was monitored and remains stable.patient was discharged home with OAC,amiodarone and follow up with PCP and cardiology as out patient.

## 2022-09-02 NOTE — SUBJECTIVE & OBJECTIVE
Past Medical History:   Diagnosis Date    Anticoagulant long-term use     Anxiety     Atrial fibrillation     CHF (congestive heart failure)     Chronic back pain        Past Surgical History:   Procedure Laterality Date    APPENDECTOMY      CHOLECYSTECTOMY      HERNIA REPAIR      NASAL SEPTUM SURGERY      RECTAL SURGERY      TRANSESOPHAGEAL ECHOCARDIOGRAPHY N/A 8/26/2021    Procedure: ECHOCARDIOGRAM, TRANSESOPHAGEAL;  Surgeon: Mikey Nicole MD;  Location: [facility] EP LAB;  Service: Cardiology;  Laterality: N/A;    TREATMENT OF CARDIAC ARRHYTHMIA N/A 8/26/2021    Procedure: CARDIOVERSION;  Surgeon: Jasbir Evans MD;  Location: [facility] EP LAB;  Service: Cardiology;  Laterality: N/A;  AF, NADIYA/DCCV, DM, ANes, 905    TREATMENT OF CARDIAC ARRHYTHMIA N/A 9/22/2021    Procedure: Cardioversion or Defibrillation;  Surgeon: Jasbir Evans MD;  Location: [facility] EP LAB;  Service: Cardiology;  Laterality: N/A;  AF, NADIYA, DCCV, MAC, DM, 3 Prep       Review of patient's allergies indicates:   Allergen Reactions    Corticosteroids (glucocorticoids) Palpitations and Anaphylaxis    Eggs [egg derived] Anaphylaxis    Eggshell membrane Anaphylaxis    Calcium channel blocking agent diltiazem analogues Other (See Comments)    Cephalexin      Anaphylaxis^    Lorazepam Other (See Comments)     nausea  Heart RACING       No current facility-administered medications on file prior to encounter.     Current Outpatient Medications on File Prior to Encounter   Medication Sig    amiodarone (PACERONE) 200 MG Tab Take 1 tablet (200 mg total) by mouth 2 (two) times daily.    carvediloL (COREG) 6.25 MG tablet Take 1 tablet (6.25 mg total) by mouth 2 (two) times daily.    nicotine (NICODERM CQ) 14 mg/24 hr Place 1 patch onto the skin once daily.    traMADoL (ULTRAM) 50 mg tablet Take 1 tablet by mouth every 6 hours as needed for pain for up to 5 days    [DISCONTINUED] furosemide (LASIX) 40 MG tablet Take 1 tablet (40 mg total) by mouth once daily.  (Patient not taking: Reported on 2022)    [DISCONTINUED] miconazole NITRATE 2 % (MICOTIN) 2 % top powder Apply topically 2 (two) times a day. Apply to groin area for 4 weeks. (Patient not taking: Reported on 2022)     Family History       Problem Relation (Age of Onset)    COPD Mother    Dementia Father          Tobacco Use    Smoking status: Former     Packs/day: 0.50     Types: Cigarettes     Quit date: 2010     Years since quittin.7    Smokeless tobacco: Never    Tobacco comments:     using nicotine patch   Substance and Sexual Activity    Alcohol use: No    Drug use: No    Sexual activity: Never     Review of Systems   Constitutional: Negative for chills, diaphoresis, fever and malaise/fatigue.   HENT:  Negative for nosebleeds.    Eyes:  Negative for blurred vision and double vision.   Cardiovascular:  Positive for irregular heartbeat and palpitations. Negative for chest pain, claudication, cyanosis, dyspnea on exertion, leg swelling, orthopnea, paroxysmal nocturnal dyspnea and syncope.   Respiratory:  Negative for cough, shortness of breath and wheezing.    Skin:  Negative for dry skin and poor wound healing.   Musculoskeletal:  Negative for back pain, joint swelling and myalgias.   Gastrointestinal:  Negative for abdominal pain, nausea and vomiting.   Genitourinary:  Negative for hematuria.   Neurological:  Negative for dizziness, headaches, numbness, seizures and weakness.   Psychiatric/Behavioral:  Negative for altered mental status and depression.    Objective:     Vital Signs (Most Recent):  Temp: 97.6 °F (36.4 °C) (22 0705)  Pulse: 103 (22 0900)  Resp: 20 (22)  BP: (!) 130/95 (22 09)  SpO2: 95 % (22)   Vital Signs (24h Range):  Temp:  [97.6 °F (36.4 °C)-98 °F (36.7 °C)] 97.6 °F (36.4 °C)  Pulse:  [] 103  Resp:  [14-34] 20  SpO2:  [91 %-97 %] 95 %  BP: (106-160)/() 130/95     Weight: 102.2 kg (225 lb 6.4 oz)  Body mass index is 36.38  kg/m².    SpO2: 95 %  O2 Device (Oxygen Therapy): room air      Intake/Output Summary (Last 24 hours) at 9/2/2022 0923  Last data filed at 9/2/2022 0900  Gross per 24 hour   Intake 212.4 ml   Output 400 ml   Net -187.6 ml       Lines/Drains/Airways       Peripheral Intravenous Line  Duration                  Peripheral IV - Single Lumen 09/02/22 0057 20 G Anterior;Distal;Right Forearm <1 day         Peripheral IV - Single Lumen 09/02/22 0225 20 G Anterior;Distal;Right Upper Arm <1 day                    Physical Exam  Constitutional:       General: He is not in acute distress.     Appearance: He is well-developed. He is obese. He is not ill-appearing, toxic-appearing or diaphoretic.   HENT:      Head: Normocephalic and atraumatic.   Eyes:      General: No scleral icterus.     Extraocular Movements: Extraocular movements intact.      Conjunctiva/sclera: Conjunctivae normal.      Pupils: Pupils are equal, round, and reactive to light.   Neck:      Thyroid: No thyromegaly.      Vascular: No JVD.      Trachea: No tracheal deviation.   Cardiovascular:      Rate and Rhythm: Tachycardia present. Rhythm irregularly irregular.      Heart sounds: S1 normal and S2 normal. No murmur heard.    No friction rub. No gallop.   Pulmonary:      Effort: Pulmonary effort is normal. No respiratory distress.      Breath sounds: Normal breath sounds. No stridor. No wheezing, rhonchi or rales.   Chest:      Chest wall: No tenderness.   Abdominal:      General: There is no distension.      Palpations: Abdomen is soft.   Musculoskeletal:         General: No swelling or tenderness. Normal range of motion.      Cervical back: Normal range of motion and neck supple. No rigidity.      Right lower leg: No edema.      Left lower leg: No edema.   Skin:     General: Skin is warm and dry.      Coloration: Skin is not jaundiced.   Neurological:      General: No focal deficit present.      Mental Status: He is alert and oriented to person, place, and  time.      Cranial Nerves: No cranial nerve deficit.   Psychiatric:         Mood and Affect: Mood normal.         Behavior: Behavior normal.       Current Medications:   famotidine  20 mg Oral BID    furosemide  40 mg Oral BID      amiodarone in dextrose 5% 1 mg/min (09/02/22 0600)    amiodarone in dextrose 5% 0.5 mg/min (09/02/22 0632)     acetaminophen, HYDROcodone-acetaminophen, morphine, ondansetron, promethazine, sodium chloride 0.9%    Laboratory (all labs reviewed):  CBC:  Recent Labs   Lab 05/03/22  2150 05/17/22  1135 05/17/22  1547 09/02/22  0033 09/02/22  0403   WBC 10.13 8.68 10.79 10.45 11.20   Hemoglobin 16.4 18.5 H 16.4 16.1 15.9   Hematocrit 48.4 54.9 H 48.2 46.3 48.0   Platelets 280 289 281 204 223       CHEMISTRIES:  Recent Labs   Lab 08/26/21  0743 09/06/21  2227 09/07/21  0526 09/08/21  0239 09/18/21  1927 05/03/22  2150 05/17/22  1135 05/17/22  1547 05/18/22  0246 05/19/22  0140 09/02/22  0033 09/02/22  0403   Glucose 88 99 98 104   < > 100 123 H 93 95 102 129 H 114 H   Sodium 142 142 141 143   < > 141 141 140 139 141 141 140   Potassium 3.0 L 4.1 3.6 3.0 L   < > 4.0 4.2 4.5 3.9 3.7 3.5 3.7   BUN 15 20 17 27 H   < > 16 15 18 21 20 20 19   Creatinine 0.8 0.8 0.9 1.2   < > 0.8 1.2 1.4 1.0 0.8 0.9 1.0   eGFR if African American >60.0 >60.0 >60.0 >60.0   < > >60.0 >60 >60 >60 >60  --   --    eGFR if non African American >60.0 >60.0 >60.0 >60.0   < > >60.0 >60 54 A >60 >60  --   --    Calcium 9.0 9.5 9.4 9.0   < > 9.9 10.3 9.5 9.1 8.9 9.2 9.2   Magnesium 1.3 L 2.0 1.7 1.7  --   --   --   --   --   --  1.8  --     < > = values in this interval not displayed.       CARDIAC BIOMARKERS:  Recent Labs   Lab 09/06/21 2227 09/18/21 1927 05/03/22 2150 05/17/22 1135 09/02/22  0033   Troponin I 0.025 0.028 H 0.018 0.018 0.044 H       COAGS:  Recent Labs   Lab 08/09/21  1154 05/17/22  1547   INR 1.2 1.0       LIPIDS/LFTS:  Recent Labs   Lab 09/18/21 1927 05/03/22 2150 05/17/22  1135 09/02/22  0033  09/02/22  0403   AST 28 21 26 31 27   ALT 26 20 28 48 H 47 H       BNP:  Recent Labs   Lab 08/24/21  2218 09/06/21  2227 09/18/21  1927 05/17/22  1135 09/02/22  0033   BNP 1,713 H 2,079 H 1,212 H 354 H 1,691 H       TSH:  Recent Labs   Lab 11/01/19  1145 07/16/21  1856 08/09/21  1154 09/07/21  1400   TSH 1.278 1.631 2.729 1.537       Free T4:  Recent Labs   Lab 08/09/21  1154   Free T4 1.12       Diagnostic Results:  ECG (personally reviewed and interpreted tracing(s)):  9/2/22 0319 , RBBB    Chest X-Ray (personally reviewed and interpreted image(s)): 9/2/22 CMeg, midl CHF    NADIYA/DCCV 5/18/22 (repeat planned)  The left ventricle is mildly enlarged with severely decreased systolic function.  The estimated ejection fraction is 20%.  Left ventricular diastolic dysfunction.  Mild left atrial enlargement.  Normal appearing left atrial appendage. No thrombus is present in the appendage.  A 200 J synchronized cardioversion was successfully performed with restoration of normal sinus rhythm.

## 2022-09-02 NOTE — ASSESSMENT & PLAN NOTE
Patient with Paroxysmal (<7 days) atrial fibrillation which is uncontrolled currently with home medications. Patient is currently in atrial fibrillation on amiodarone drip. [id number] Score: The patient doesn't have any registry metric data available. HASBLED Score: Unable to calculate. Anticoagulation not indicated due to procedure.  Patient remains hemodynamically stable and admitted to ICU for continued cardiac monitoring while receiving amiodarone drip for treatment of atrial fibrillation with RVR.  Cardiology consulted awaiting further evaluation/recommendations.  ICU consult and appreciate assistance.  Plan:  -admit ICU  -continue amiodarone drip  -f/u cardiology and ICU recommendations

## 2022-09-02 NOTE — NURSING
Pt arrived to the ICU with ED RN, amiodarone infusing, 's, a-fib on the monitor, Dr. Garcia assessed pt at bedside, no acute distress at this time, will continue to monitor.

## 2022-09-02 NOTE — CONSULTS
[facility] - Intensive Care  Cardiology  Consult Note    Patient Name: Timmy Wade  MRN: [id number]  Admission Date: 9/1/2022  Hospital Length of Stay: 0 days  Code Status: Full Code   Attending Provider: Aftab Craig MD   Consulting Provider: Ramon Rodríguez MD  Primary Care Physician: Robert Bravo MD  Principal Problem:Atrial fibrillation with RVR    Patient information was obtained from patient and ER records.     Inpatient consult to Cardiology  Consult performed by: Ramon Rodríguez MD  Consult ordered by: Stefanie Doyle MD  Reason for consult: AF/RVR        Subjective:     Chief Complaint:  palps     HPI:   61 y.o. male with a PMH  has a past medical history of Anticoagulant long-term use, Anxiety, Atrial fibrillation, CHF (congestive heart failure), and Chronic back pain. who presented to the ED complaining of acute onset palpitations with associated shortness of breath and abdominal bloating x1 day duration.  Patient has recurrent admissions for symptomatic atrial fibrillation resulting in patient being admitted to ICU for rate/rhythm control and cardioversion with last admission back in may of this year. He reported non-compliance with his home medications due to side affects and decreased frequency of flares but reported he may be getting to the point where he needs to start taking his medications vs undergoing possible ablation. He reported his flares occur sporadically with no known alleviating or aggravating factors and reports being asymptomatic currently. Patient was initiated on amiodarone drip and admitted to the ICU for continued monitoring while awaiting further evaluation/treatment by cardiology in the morning.     Cardiology consulted for AF/RVR and CHF.    Patient previously known to me with a history of atrial fibrillation and heart failure.  He is also seen by electrophysiology in the past (Dr. Evans).  He presents with complaints of palpitations.  He denies any  angina or dyspnea.  He is found to be in atrial fibrillation with rapid ventricular response.  He has not been compliant with his medications and states he has intolerant of them.  I explained to him the need to continue taking his medications in order to prevent his paroxysmal atrial fibrillation and these associated symptoms.      Past Medical History:   Diagnosis Date    Anticoagulant long-term use     Anxiety     Atrial fibrillation     CHF (congestive heart failure)     Chronic back pain        Past Surgical History:   Procedure Laterality Date    APPENDECTOMY      CHOLECYSTECTOMY      HERNIA REPAIR      NASAL SEPTUM SURGERY      RECTAL SURGERY      TRANSESOPHAGEAL ECHOCARDIOGRAPHY N/A 8/26/2021    Procedure: ECHOCARDIOGRAM, TRANSESOPHAGEAL;  Surgeon: Mikey Nicole MD;  Location: [facility] EP LAB;  Service: Cardiology;  Laterality: N/A;    TREATMENT OF CARDIAC ARRHYTHMIA N/A 8/26/2021    Procedure: CARDIOVERSION;  Surgeon: Jasbir Evans MD;  Location: [facility] EP LAB;  Service: Cardiology;  Laterality: N/A;  AF, NADIYA/DCCV, DM, ANes, 905    TREATMENT OF CARDIAC ARRHYTHMIA N/A 9/22/2021    Procedure: Cardioversion or Defibrillation;  Surgeon: Jasbir Evans MD;  Location: [facility] EP LAB;  Service: Cardiology;  Laterality: N/A;  AF, NADIYA, DCCV, MAC, DM, 3 Prep       Review of patient's allergies indicates:   Allergen Reactions    Corticosteroids (glucocorticoids) Palpitations and Anaphylaxis    Eggs [egg derived] Anaphylaxis    Eggshell membrane Anaphylaxis    Calcium channel blocking agent diltiazem analogues Other (See Comments)    Cephalexin      Anaphylaxis^    Lorazepam Other (See Comments)     nausea  Heart RACING       No current facility-administered medications on file prior to encounter.     Current Outpatient Medications on File Prior to Encounter   Medication Sig    amiodarone (PACERONE) 200 MG Tab Take 1 tablet (200 mg total) by mouth 2 (two) times daily.    carvediloL (COREG) 6.25 MG  tablet Take 1 tablet (6.25 mg total) by mouth 2 (two) times daily.    nicotine (NICODERM CQ) 14 mg/24 hr Place 1 patch onto the skin once daily.    traMADoL (ULTRAM) 50 mg tablet Take 1 tablet by mouth every 6 hours as needed for pain for up to 5 days    [DISCONTINUED] furosemide (LASIX) 40 MG tablet Take 1 tablet (40 mg total) by mouth once daily. (Patient not taking: Reported on 2022)    [DISCONTINUED] miconazole NITRATE 2 % (MICOTIN) 2 % top powder Apply topically 2 (two) times a day. Apply to groin area for 4 weeks. (Patient not taking: Reported on 2022)     Family History       Problem Relation (Age of Onset)    COPD Mother    Dementia Father          Tobacco Use    Smoking status: Former     Packs/day: 0.50     Types: Cigarettes     Quit date: 2010     Years since quittin.7    Smokeless tobacco: Never    Tobacco comments:     using nicotine patch   Substance and Sexual Activity    Alcohol use: No    Drug use: No    Sexual activity: Never     Review of Systems   Constitutional: Negative for chills, diaphoresis, fever and malaise/fatigue.   HENT:  Negative for nosebleeds.    Eyes:  Negative for blurred vision and double vision.   Cardiovascular:  Positive for irregular heartbeat and palpitations. Negative for chest pain, claudication, cyanosis, dyspnea on exertion, leg swelling, orthopnea, paroxysmal nocturnal dyspnea and syncope.   Respiratory:  Negative for cough, shortness of breath and wheezing.    Skin:  Negative for dry skin and poor wound healing.   Musculoskeletal:  Negative for back pain, joint swelling and myalgias.   Gastrointestinal:  Negative for abdominal pain, nausea and vomiting.   Genitourinary:  Negative for hematuria.   Neurological:  Negative for dizziness, headaches, numbness, seizures and weakness.   Psychiatric/Behavioral:  Negative for altered mental status and depression.    Objective:     Vital Signs (Most Recent):  Temp: 97.6 °F (36.4 °C) (22  0705)  Pulse: 103 (09/02/22 0900)  Resp: 20 (09/02/22 0900)  BP: (!) 130/95 (09/02/22 0900)  SpO2: 95 % (09/02/22 0900)   Vital Signs (24h Range):  Temp:  [97.6 °F (36.4 °C)-98 °F (36.7 °C)] 97.6 °F (36.4 °C)  Pulse:  [] 103  Resp:  [14-34] 20  SpO2:  [91 %-97 %] 95 %  BP: (106-160)/() 130/95     Weight: 102.2 kg (225 lb 6.4 oz)  Body mass index is 36.38 kg/m².    SpO2: 95 %  O2 Device (Oxygen Therapy): room air      Intake/Output Summary (Last 24 hours) at 9/2/2022 0923  Last data filed at 9/2/2022 0900  Gross per 24 hour   Intake 212.4 ml   Output 400 ml   Net -187.6 ml       Lines/Drains/Airways       Peripheral Intravenous Line  Duration                  Peripheral IV - Single Lumen 09/02/22 0057 20 G Anterior;Distal;Right Forearm <1 day         Peripheral IV - Single Lumen 09/02/22 0225 20 G Anterior;Distal;Right Upper Arm <1 day                    Physical Exam  Constitutional:       General: He is not in acute distress.     Appearance: He is well-developed. He is obese. He is not ill-appearing, toxic-appearing or diaphoretic.   HENT:      Head: Normocephalic and atraumatic.   Eyes:      General: No scleral icterus.     Extraocular Movements: Extraocular movements intact.      Conjunctiva/sclera: Conjunctivae normal.      Pupils: Pupils are equal, round, and reactive to light.   Neck:      Thyroid: No thyromegaly.      Vascular: No JVD.      Trachea: No tracheal deviation.   Cardiovascular:      Rate and Rhythm: Tachycardia present. Rhythm irregularly irregular.      Heart sounds: S1 normal and S2 normal. No murmur heard.    No friction rub. No gallop.   Pulmonary:      Effort: Pulmonary effort is normal. No respiratory distress.      Breath sounds: Normal breath sounds. No stridor. No wheezing, rhonchi or rales.   Chest:      Chest wall: No tenderness.   Abdominal:      General: There is no distension.      Palpations: Abdomen is soft.   Musculoskeletal:         General: No swelling or tenderness.  Normal range of motion.      Cervical back: Normal range of motion and neck supple. No rigidity.      Right lower leg: No edema.      Left lower leg: No edema.   Skin:     General: Skin is warm and dry.      Coloration: Skin is not jaundiced.   Neurological:      General: No focal deficit present.      Mental Status: He is alert and oriented to person, place, and time.      Cranial Nerves: No cranial nerve deficit.   Psychiatric:         Mood and Affect: Mood normal.         Behavior: Behavior normal.       Current Medications:   famotidine  20 mg Oral BID    furosemide  40 mg Oral BID      amiodarone in dextrose 5% 1 mg/min (09/02/22 0600)    amiodarone in dextrose 5% 0.5 mg/min (09/02/22 0632)     acetaminophen, HYDROcodone-acetaminophen, morphine, ondansetron, promethazine, sodium chloride 0.9%    Laboratory (all labs reviewed):  CBC:  Recent Labs   Lab 05/03/22  2150 05/17/22  1135 05/17/22  1547 09/02/22  0033 09/02/22  0403   WBC 10.13 8.68 10.79 10.45 11.20   Hemoglobin 16.4 18.5 H 16.4 16.1 15.9   Hematocrit 48.4 54.9 H 48.2 46.3 48.0   Platelets 280 289 281 204 223       CHEMISTRIES:  Recent Labs   Lab 08/26/21  0743 09/06/21  2227 09/07/21  0526 09/08/21  0239 09/18/21  1927 05/03/22  2150 05/17/22  1135 05/17/22  1547 05/18/22  0246 05/19/22  0140 09/02/22  0033 09/02/22  0403   Glucose 88 99 98 104   < > 100 123 H 93 95 102 129 H 114 H   Sodium 142 142 141 143   < > 141 141 140 139 141 141 140   Potassium 3.0 L 4.1 3.6 3.0 L   < > 4.0 4.2 4.5 3.9 3.7 3.5 3.7   BUN 15 20 17 27 H   < > 16 15 18 21 20 20 19   Creatinine 0.8 0.8 0.9 1.2   < > 0.8 1.2 1.4 1.0 0.8 0.9 1.0   eGFR if African American >60.0 >60.0 >60.0 >60.0   < > >60.0 >60 >60 >60 >60  --   --    eGFR if non African American >60.0 >60.0 >60.0 >60.0   < > >60.0 >60 54 A >60 >60  --   --    Calcium 9.0 9.5 9.4 9.0   < > 9.9 10.3 9.5 9.1 8.9 9.2 9.2   Magnesium 1.3 L 2.0 1.7 1.7  --   --   --   --   --   --  1.8  --     < > = values in this  interval not displayed.       CARDIAC BIOMARKERS:  Recent Labs   Lab 09/06/21 2227 09/18/21 1927 05/03/22  2150 05/17/22  1135 09/02/22  0033   Troponin I 0.025 0.028 H 0.018 0.018 0.044 H       COAGS:  Recent Labs   Lab 08/09/21  1154 05/17/22  1547   INR 1.2 1.0       LIPIDS/LFTS:  Recent Labs   Lab 09/18/21 1927 05/03/22  2150 05/17/22  1135 09/02/22  0033 09/02/22  0403   AST 28 21 26 31 27   ALT 26 20 28 48 H 47 H       BNP:  Recent Labs   Lab 08/24/21 2218 09/06/21 2227 09/18/21 1927 05/17/22  1135 09/02/22  0033   BNP 1,713 H 2,079 H 1,212 H 354 H 1,691 H       TSH:  Recent Labs   Lab 11/01/19  1145 07/16/21  1856 08/09/21  1154 09/07/21  1400   TSH 1.278 1.631 2.729 1.537       Free T4:  Recent Labs   Lab 08/09/21  1154   Free T4 1.12       Diagnostic Results:  ECG (personally reviewed and interpreted tracing(s)):  9/2/22 0319 , RBBB    Chest X-Ray (personally reviewed and interpreted image(s)): 9/2/22 CMeg, midl CHF    NADIYA/DCCV 5/18/22 (repeat planned)   The left ventricle is mildly enlarged with severely decreased systolic function.   The estimated ejection fraction is 20%.   Left ventricular diastolic dysfunction.   Mild left atrial enlargement.   Normal appearing left atrial appendage. No thrombus is present in the appendage.   A 200 J synchronized cardioversion was successfully performed with restoration of normal sinus rhythm.          Assessment and Plan:     * Atrial fibrillation with RVR  Hx PAF  Nonadherent with med rx, unclear which of his meds is the problem  Will plan NADIYA/DCCV today  Cont amio po as outpatient  Start Xarelto 20mg qhs  Consider EPS eval for alternative AAD vs ablation  Presumed tachymyopathy, will need repeat echo in 3 months for reassessment of LVEF after SR restored.    Risks, benefits and alternatives of the NADIYA/Cardioversion procedure were discussed with the patient including throat irritation, aspiration, anesthetic complications, esophageal  irritation/perforation, skin irritation, arrhythmia, etc.  Patient understands and agrees to proceed.  Permits signed.      Chronic combined systolic and diastolic congestive heart failure  Does not appear grossly vol overloaded on exam  ?tachymyopathy  Initiate GDMT assuming HR/BP will allow        VTE Risk Mitigation (From admission, onward)         Ordered     rivaroxaban tablet 20 mg  With dinner         09/02/22 0940     Reason for no Mechanical VTE Prophylaxis  Once        Question:  Reasons:  Answer:  Physician Provided (leave comment)  Comment:  awaiting procedure    09/02/22 0240     IP VTE HIGH RISK PATIENT  Once         09/02/22 0240     Place sequential compression device  Until discontinued         09/02/22 0240              Pt seen in ICU, critical care time 35min.  Case d/w RN.    Thank you for your consult. I will follow-up with patient. Please contact us if you have any additional questions.    Ramon Rodríguez MD  Cardiology   [facility] - Intensive Care

## 2022-09-02 NOTE — DISCHARGE SUMMARY
[facility] Medicine  Discharge Summary      Patient Name: Timmy Wade  MRN: [id number]  Patient Class: IP- Inpatient  Admission Date: 9/1/2022  Hospital Length of Stay: 1 day   Discharge Date and Time:  09/02/2022 3:37 PM  Attending Physician: Aftab Craig MD   Discharging Provider: Aftab Craig MD  Primary Care Provider: Robert Bravo MD      HPI:   Timmy Wade is a 61 y.o. male with a PMH  has a past medical history of Anticoagulant long-term use, Anxiety, Atrial fibrillation, CHF (congestive heart failure), and Chronic back pain. who presented to the ED complaining of acute onset palpitations with associated shortness of breath and abdominal bloating x1 day duration.  Patient has recurrent admissions for symptomatic atrial fibrillation resulting in patient being admitted to ICU for rate/rhythm control and cardioversion with last admission back in may of this year. He reported non-compliance with his home medications due to side affects and decreased frequency of flares but reported he may be getting to the point where he needs to start taking his medications vs undergoing possible ablation. He reported his flares occur sporadically with no known alleviating or aggravating factors and reports being asymptomatic currently. Patient was initiated on amiodarone drip and admitted to the ICU for continued monitoring while awaiting further evaluation/treatment by cardiology in the morning.     PCP: Robert Bravo        Procedure(s) (LRB):  Transesophageal echo (NADIYA) intra-procedure log documentation (N/A)  Cardioversion or Defibrillation (N/A)      Hospital Course:   patient with known history of Afib,was not on OAC,was  admitted for Afib with RVR,was started  on amiodarone drip,cardiology started on OAC and had cardioversion ,converted to sinus,amiodarone was switched to tapped PO amiodarone,patient was monitored and remains stable.patient was discharged home  with OAC,amiodarone and follow up with PCP and cardiology as out patient.       Goals of Care Treatment Preferences:  Code Status: Full Code      Consults:   Consults (From admission, onward)        Status Ordering Provider     Inpatient consult to Social Work  Once        Provider:  (Not yet assigned)    Completed JAD GARCIA     Inpatient consult to Cardiology  Once        Provider:  Pablo Villar MD    Completed MALIK FERRIS          No new Assessment & Plan notes have been filed under this hospital service since the last note was generated.  Service: Hospital Medicine    Final Active Diagnoses:    Diagnosis Date Noted POA    PRINCIPAL PROBLEM:  Atrial fibrillation with RVR [I48.91] 12/07/2012 Yes    Chronic combined systolic and diastolic congestive heart failure [I50.42] 08/09/2021 Yes    Obesity (BMI 30-39.9) [E66.9] 08/09/2021 Yes    Tobacco use disorder [F17.200] 08/09/2021 Yes      Problems Resolved During this Admission:    Diagnosis Date Noted Date Resolved POA    Anxiety [F41.9] 08/25/2021 09/02/2022 Yes       Discharged Condition: stable    Disposition: Home or Self Care    Follow Up:   Follow-up Information     [facility]. Schedule an appointment as soon as possible for a visit in 1 week(s).    Why: for Hospital Follow up, for Cardiology follow up  Contact information:  [address] [place] [address] [phone]             Robert Bravo MD Follow up in 1 week(s).    Specialty: Internal Medicine  Contact information:  [address] [place] [address] [address]  [place] [address] [phone]                       Patient Instructions:      Ambulatory referral/consult to Cardiology   Standing Status: Future   Referral Priority: Routine Referral Type: Consultation   Referral Reason: Specialty Services Required   Requested Specialty: Cardiology   Number of Visits Requested: 1     Activity as tolerated       Significant Diagnostic Studies: Labs:   BMP:   Recent Labs    Lab 09/02/22  0033 09/02/22  0403   * 114*    140   K 3.5 3.7    106   CO2 23 25   BUN 20 19   CREATININE 0.9 1.0   CALCIUM 9.2 9.2   MG 1.8  --    , CMP   Recent Labs   Lab 09/02/22  0033 09/02/22  0403    140   K 3.5 3.7    106   CO2 23 25   * 114*   BUN 20 19   CREATININE 0.9 1.0   CALCIUM 9.2 9.2   PROT 6.0 6.2   ALBUMIN 3.3* 3.5   BILITOT 1.0 0.9   ALKPHOS 55 56   AST 31 27   ALT 48* 47*   ANIONGAP 12 9    and CBC   Recent Labs   Lab 09/02/22  0033 09/02/22  0403   WBC 10.45 11.20   HGB 16.1 15.9   HCT 46.3 48.0    223     Radiology: X-Ray: CXR: X-Ray Chest 1 View (CXR): No results found for this visit on 09/01/22. and X-Ray Chest PA and Lateral (CXR): No results found for this visit on 09/01/22.    Pending Diagnostic Studies:     None         Medications:  Reconciled Home Medications:      Medication List      START taking these medications    rivaroxaban 20 mg Tab  Commonly known as: XARELTO  Take 1 tablet (20 mg total) by mouth daily with dinner or evening meal.        CHANGE how you take these medications    * amiodarone 400 MG tablet  Commonly known as: PACERONE  Take 1 tablet (400 mg total) by mouth 2 (two) times daily. for 14 days  What changed:   · medication strength  · how much to take     * amiodarone 200 MG Tab  Commonly known as: PACERONE  Take 1 tablet (200 mg total) by mouth 2 (two) times daily. for 14 days  Start taking on: September 16, 2022  What changed: You were already taking a medication with the same name, and this prescription was added. Make sure you understand how and when to take each.     * amiodarone 200 MG Tab  Commonly known as: PACERONE  Take 1 tablet (200 mg total) by mouth once daily.  Start taking on: September 30, 2022  What changed: You were already taking a medication with the same name, and this prescription was added. Make sure you understand how and when to take each.     furosemide 40 MG tablet  Commonly known as:  LASIX  Take 1 tablet (40 mg total) by mouth 2 (two) times daily.  What changed: when to take this         * This list has 3 medication(s) that are the same as other medications prescribed for you. Read the directions carefully, and ask your doctor or other care provider to review them with you.            CONTINUE taking these medications    carvediloL 6.25 MG tablet  Commonly known as: COREG  Take 1 tablet (6.25 mg total) by mouth 2 (two) times daily.     traMADoL 50 mg tablet  Commonly known as: ULTRAM  Take 1 tablet by mouth every 6 hours as needed for pain for up to 5 days        STOP taking these medications    Anti-FungaL 2 % top powder  Generic drug: miconazole NITRATE 2 %     nicotine 14 mg/24 hr  Commonly known as: NICODERM CQ            Indwelling Lines/Drains at time of discharge:   Lines/Drains/Airways     None                 Time spent on the discharge of patient: over 45    minutes    Critical care time spent on the evaluation and treatment of severe organ dysfunction, review of pertinent labs and imaging studies, discussions with consulting providers and discussions with patient/family: over 45  minutes.     Aftab Craig MD  Department of Hospital Medicine  [facility] [place] - Intensive Care

## 2022-09-02 NOTE — NURSING
AVS given to pt and discharge education completed with pt. All questions addressed. All belongings with pt. Cab called for pt.

## 2022-09-02 NOTE — H&P
[facility] Medicine  History & Physical    Patient Name: Timmy Wade  MRN: [id number]  Patient Class: IP- Inpatient  Admission Date: 9/1/2022  Attending Physician: Aftab Craig MD   Primary Care Provider: Robert Bravo MD       Patient information was obtained from patient, past medical records and ER records.     Subjective:     Principal Problem:Atrial fibrillation with RVR    Chief Complaint:   Chief Complaint   Patient presents with    Palpitations     Reports feeling like he is in Afib and has nausea also.        HPI: Timmy Wade is a 61 y.o. male with a PMH  has a past medical history of Anticoagulant long-term use, Anxiety, Atrial fibrillation, CHF (congestive heart failure), and Chronic back pain. who presented to the ED complaining of acute onset palpitations with associated shortness of breath and abdominal bloating x1 day duration.  Patient has recurrent admissions for symptomatic atrial fibrillation resulting in patient being admitted to ICU for rate/rhythm control and cardioversion with last admission back in may of this year. He reported non-compliance with his home medications due to side affects and decreased frequency of flares but reported he may be getting to the point where he needs to start taking his medications vs undergoing possible ablation. He reported his flares occur sporadically with no known alleviating or aggravating factors and reports being asymptomatic currently. Patient was initiated on amiodarone drip and admitted to the ICU for continued monitoring while awaiting further evaluation/treatment by cardiology in the morning.     PCP: Robert Bravo      Past Medical History:   Diagnosis Date    Anticoagulant long-term use     Anxiety     Atrial fibrillation     CHF (congestive heart failure)     Chronic back pain        Past Surgical History:   Procedure Laterality Date    APPENDECTOMY      CHOLECYSTECTOMY      HERNIA REPAIR       NASAL SEPTUM SURGERY      RECTAL SURGERY      TRANSESOPHAGEAL ECHOCARDIOGRAPHY N/A 8/26/2021    Procedure: ECHOCARDIOGRAM, TRANSESOPHAGEAL;  Surgeon: Mikey Nicole MD;  Location: [facility] EP LAB;  Service: Cardiology;  Laterality: N/A;    TREATMENT OF CARDIAC ARRHYTHMIA N/A 8/26/2021    Procedure: CARDIOVERSION;  Surgeon: Jasbir Evans MD;  Location: [facility] EP LAB;  Service: Cardiology;  Laterality: N/A;  AF, NADIYA/DCCV, DM, ANes, 905    TREATMENT OF CARDIAC ARRHYTHMIA N/A 9/22/2021    Procedure: Cardioversion or Defibrillation;  Surgeon: Jasbir Evans MD;  Location: [facility] EP LAB;  Service: Cardiology;  Laterality: N/A;  AF, NADIYA, DCCV, MAC, DM, 3 Prep       Review of patient's allergies indicates:   Allergen Reactions    Corticosteroids (glucocorticoids) Palpitations and Anaphylaxis    Eggs [egg derived] Anaphylaxis    Eggshell membrane Anaphylaxis    Calcium channel blocking agent diltiazem analogues Other (See Comments)    Cephalexin      Anaphylaxis^    Lorazepam Other (See Comments)     nausea  Heart RACING       No current facility-administered medications on file prior to encounter.     Current Outpatient Medications on File Prior to Encounter   Medication Sig    amiodarone (PACERONE) 200 MG Tab Take 1 tablet (200 mg total) by mouth 2 (two) times daily.    carvediloL (COREG) 6.25 MG tablet Take 1 tablet (6.25 mg total) by mouth 2 (two) times daily.    nicotine (NICODERM CQ) 14 mg/24 hr Place 1 patch onto the skin once daily.    traMADoL (ULTRAM) 50 mg tablet Take 1 tablet by mouth every 6 hours as needed for pain for up to 5 days    [DISCONTINUED] furosemide (LASIX) 40 MG tablet Take 1 tablet (40 mg total) by mouth once daily. (Patient not taking: Reported on 5/17/2022)    [DISCONTINUED] miconazole NITRATE 2 % (MICOTIN) 2 % top powder Apply topically 2 (two) times a day. Apply to groin area for 4 weeks. (Patient not taking: Reported on 5/17/2022)     Family History       Problem Relation  (Age of Onset)    COPD Mother    Dementia Father          Tobacco Use    Smoking status: Former     Packs/day: 0.50     Types: Cigarettes     Quit date: 2010     Years since quittin.7    Smokeless tobacco: Never    Tobacco comments:     using nicotine patch   Substance and Sexual Activity    Alcohol use: No    Drug use: No    Sexual activity: Never     Review of Systems   All other systems reviewed and are negative.  Objective:     Vital Signs (Most Recent):  Temp: 98 °F (36.7 °C) (22 0200)  Pulse: (!) 116 (22)  Resp: (!) 24 (22)  BP: 122/84 (22)  SpO2: (!) 93 % (22)   Vital Signs (24h Range):  Temp:  [97.8 °F (36.6 °C)-98 °F (36.7 °C)] 98 °F (36.7 °C)  Pulse:  [116-133] 116  Resp:  [16-25] 24  SpO2:  [93 %-97 %] 93 %  BP: (114-160)/() 122/84     Weight: 106.1 kg (233 lb 14.5 oz)  Body mass index is 37.75 kg/m².    Physical Exam  Constitutional:       General: He is not in acute distress.     Appearance: Normal appearance. He is obese. He is not ill-appearing, toxic-appearing or diaphoretic.   HENT:      Head: Normocephalic and atraumatic.      Right Ear: External ear normal.      Left Ear: External ear normal.      Nose: Nose normal. No congestion or rhinorrhea.      Mouth/Throat:      Mouth: Mucous membranes are moist.      Pharynx: Oropharynx is clear. No oropharyngeal exudate or posterior oropharyngeal erythema.   Eyes:      General: No scleral icterus.     Extraocular Movements: Extraocular movements intact.      Conjunctiva/sclera: Conjunctivae normal.      Pupils: Pupils are equal, round, and reactive to light.   Neck:      Vascular: No carotid bruit.   Cardiovascular:      Rate and Rhythm: Tachycardia present. Rhythm irregular.      Pulses: Normal pulses.      Heart sounds: Normal heart sounds. No murmur heard.    No friction rub. No gallop.   Pulmonary:      Effort: Pulmonary effort is normal. No respiratory distress.      Breath  sounds: Normal breath sounds. No stridor. No wheezing, rhonchi or rales.   Chest:      Chest wall: No tenderness.   Abdominal:      General: Abdomen is flat. Bowel sounds are normal. There is no distension.      Palpations: Abdomen is soft. There is no mass.      Tenderness: There is no abdominal tenderness. There is no guarding or rebound.      Hernia: No hernia is present.   Musculoskeletal:         General: No swelling, tenderness, deformity or signs of injury. Normal range of motion.      Cervical back: Normal range of motion and neck supple. No rigidity or tenderness.      Right lower leg: Edema present.      Left lower leg: Edema present.   Lymphadenopathy:      Cervical: No cervical adenopathy.   Skin:     General: Skin is warm and dry.      Capillary Refill: Capillary refill takes less than 2 seconds.      Coloration: Skin is not jaundiced or pale.      Findings: No bruising, erythema, lesion or rash.   Neurological:      General: No focal deficit present.      Mental Status: He is alert and oriented to person, place, and time. Mental status is at baseline.      Cranial Nerves: No cranial nerve deficit.      Sensory: No sensory deficit.      Motor: No weakness.      Coordination: Coordination normal.   Psychiatric:         Mood and Affect: Mood normal.         Behavior: Behavior normal.         Thought Content: Thought content normal.         Judgment: Judgment normal.         CRANIAL NERVES     CN III, IV, VI   Pupils are equal, round, and reactive to light.     Significant Labs: All pertinent labs within the past 24 hours have been reviewed.    Significant Imaging: I have reviewed all pertinent imaging results/findings within the past 24 hours.    Assessment/Plan:     * Atrial fibrillation with RVR  Patient with Paroxysmal (<7 days) atrial fibrillation which is uncontrolled currently with home medications. Patient is currently in atrial fibrillation on amiodarone drip. [id number] Score: The patient doesn't  have any registry metric data available. HASBLED Score: Unable to calculate. Anticoagulation not indicated due to procedure.  Patient remains hemodynamically stable and admitted to ICU for continued cardiac monitoring while receiving amiodarone drip for treatment of atrial fibrillation with RVR.  Cardiology consulted awaiting further evaluation/recommendations.  ICU consult and appreciate assistance.  Plan:  -admit ICU  -continue amiodarone drip  -f/u cardiology and ICU recommendations        Chronic combined systolic and diastolic congestive heart failure  Not in acute exacerbation.  Patient with bilateral lower extremity edema but without evidence of crackles or elevated JVD on physical exam. Last reported EF measuring 20% on echo back on 5/17/22.  Patient reports noncompliance with home medications.  Plan:  -monitor Is/Os  -low salt/cardiac diet  -f/u cardiology and resume medications following their recommendations     Obesity (BMI 30-39.9)  Body mass index is 36.38 kg/m². Morbid obesity complicates all aspects of disease management from diagnostic modalities to treatment. Weight loss encouraged and health benefits explained to patient.         Tobacco use disorder  Patient reports smoking approximately 1-2 pack of cigarettes daily with no thoughts of cutting back currently. Patient educated on morbidity and mortality in regards to continuation of smoking and stressed importance of cessation. Patient currently declined nicotine patch at time of admission but will be available at patient's request.  Plan:  -Nicotine patch available at patient's request        VTE Risk Mitigation (From admission, onward)         Ordered     Reason for no Mechanical VTE Prophylaxis  Once        Question:  Reasons:  Answer:  Physician Provided (leave comment)  Comment:  awaiting procedure    09/02/22 0240     IP VTE HIGH RISK PATIENT  Once         09/02/22 0240     Place sequential compression device  Until discontinued          09/02/22 0240                   Nile Garcia MD  Department of Hospital Medicine   [facility] - Emergency Dept

## 2022-09-02 NOTE — ASSESSMENT & PLAN NOTE
Body mass index is 36.38 kg/m². Morbid obesity complicates all aspects of disease management from diagnostic modalities to treatment. Weight loss encouraged and health benefits explained to patient.

## 2022-09-02 NOTE — ASSESSMENT & PLAN NOTE
Not in acute exacerbation.  Patient with bilateral lower extremity edema but without evidence of crackles or elevated JVD on physical exam. Last reported EF measuring 20% on echo back on 5/17/22.  Patient reports noncompliance with home medications.  Plan:  -monitor Is/Os  -low salt/cardiac diet  -f/u cardiology and resume medications following their recommendations

## 2022-09-02 NOTE — ASSESSMENT & PLAN NOTE
Does not appear grossly vol overloaded on exam  ?tachymyopathy  Initiate GDMT assuming HR/BP will allow

## 2022-09-02 NOTE — PLAN OF CARE
Initial assessment completed at bed side to discuss how patient will manage h_ care at the next level.  Roll of CM discussed.  Patient identified by using 2 identifiers:  Name and date of birth.    [facility] [place], [place] [facility]  Initial Discharge Assessment       Primary Care Provider: Robert Bravo MD    Admission Diagnosis: Palpitations [R00.2]  Elevated troponin [R77.8]  Atrial fibrillation with RVR [I48.91]  Congestive heart failure, unspecified HF chronicity, unspecified heart failure type [I50.9]    Admission Date: 9/1/2022  Expected Discharge Date:     Discharge Barriers Identified: None    Payor: MEDICAID / Plan: [place]ALTHY BLUE ([employer] [place]) / Product Type: Managed Medicaid /     No emergency contact information on file.    Discharge Plan A: Home  Discharge Plan B: Home      [id number] - [place] - [address] AT [facility] [address]  [address]  [place] [address]  Phone: [phone] Fax: [phone]      Initial Assessment (most recent)       Adult Discharge Assessment - 09/02/22 1305          Discharge Assessment    Assessment Type Discharge Planning Assessment     Confirmed/corrected address, phone number and insurance Yes     Confirmed Demographics Correct on Facesheet     Source of Information patient     When was your last doctors appointment? 04/22/22     Communicated DENNY with patient/caregiver Date not available/Unable to determine     Reason For Admission A FIB with RVR     Lives With alone     Do you expect to return to your current living situation? Yes     Do you have help at home or someone to help you manage your care at home? Yes     Prior to hospitilization cognitive status: Alert/Oriented     Current cognitive status: Alert/Oriented     Walking or Climbing Stairs Difficulty none     Dressing/Bathing Difficulty none     Home Layout Able to live on 1st floor     Equipment Currently Used at Home none     Readmission within 30 days? No     Patient currently being  followed by outpatient case management? No     Do you currently have service(s) that help you manage your care at home? No     Do you take prescription medications? Yes     Do you have prescription coverage? Yes     Coverage Healthy Blue     Do you have any problems affording any of your prescribed medications? No     Is the patient taking medications as prescribed? yes     Who is going to help you get home at discharge? Friends and family     How do you get to doctors appointments? car, drives self     Are you on dialysis? No     Do you take coumadin? No     Discharge Plan A Home     Discharge Plan B Home     DME Needed Upon Discharge  none     Discharge Plan discussed with: Patient     Discharge Barriers Identified None

## 2022-09-02 NOTE — EICU
EICU BRIEF ADMIT NOTE:    HISTORY: Please refer to H/P and ER notes for detail. Denies any chest pain or shortness of breath    CAMERA ASSESSMENT: Two way audiovisual assessment was done: no distress    Telemetry was reviewed. Medical records including notes, labs and imaging were reviewed.atrial fibrillation, heart rate around 110-120 bpm    DISCUSSED with bedside nurse.    ASSESSMENT AND PLAN:    Atrial fibrillation with rapid ventricular response.  Noncompliance with medication at home.  Underlying cardiomyopathy, EF 20%  --On amiodarone infusion.  Continue  --Cardiology consult  --1 dose of Lovenox, therapeutic given.  Will likely need an tach ablation at home  --Lasix as needed    [facility] REVIEW:    STRESS ULCER PROPHYLAXIS: not indicated  DVT PROPHYLAXIS:   Lovenox    Thank You for allowing EICU to participate in the care of the patient. Please call as needed      Sanchez Horne MD  [facility]  [phone]

## 2022-09-06 ENCOUNTER — PATIENT OUTREACH (OUTPATIENT)
Dept: ADMINISTRATIVE | Facility: CLINIC | Age: 62
End: 2022-09-06
Payer: MEDICAID

## 2022-09-15 NOTE — ANESTHESIA PREPROCEDURE EVALUATION
09/15/2022  Timmy Wade is a 62 y.o., male.      Pre-op Assessment     I have reviewed the Nursing Notes.       Review of Systems  Anesthesia Hx:  No problems with previous Anesthesia    Social:  Former Smoker    Cardiovascular:   Denies Pacemaker. Dysrhythmias atrial fibrillation CHF Echo 5/2022 The left ventricle is mildly enlarged with severely decreased systolic function.  ? The estimated ejection fraction is 20%.  ? Left ventricular diastolic dysfunction.  ? Mild left atrial enlargement.  ? Normal appearing left atrial appendage. No thrombus is present in the appendage.  ?   Pulmonary:  Pulmonary Normal    Renal/:  Renal/ Normal     Hepatic/GI:  Hepatic/GI Normal    Neurological:  Neurology Normal    Endocrine:  Obesity / BMI > 30      Physical Exam  General: Well nourished, Cooperative, Alert and Oriented    Heart:  Rhythm: Irregularly Irregular        Anesthesia Plan  Type of Anesthesia, risks & benefits discussed:    Anesthesia Type: Gen Natural Airway  Intra-op Monitoring Plan: Standard ASA Monitors  Post Op Pain Control Plan: multimodal analgesia  Induction:  IV  Informed Consent: Informed consent signed with the Patient and all parties understand the risks and agree with anesthesia plan.  All questions answered.   ASA Score: 3  Day of Surgery Review of History & Physical: H&P Update referred to the surgeon/provider.  Anesthesia Plan Notes: Judicious induction given EF 20%    Ready For Surgery From Anesthesia Perspective.     .

## 2022-09-15 NOTE — ANESTHESIA POSTPROCEDURE EVALUATION
Anesthesia Post Evaluation    Patient: Timmy Wade    Procedure(s) Performed: Procedure(s) (LRB):  Transesophageal echo (NADIYA) intra-procedure log documentation (N/A)  Cardioversion or Defibrillation (N/A)    Final Anesthesia Type: general      Patient location: recovered in the OR.  Patient participation: Yes- Able to Participate  Level of consciousness: awake and alert  Post-procedure vital signs: reviewed and stable  Pain management: adequate  Airway patency: patent    PONV status at discharge: No PONV  Anesthetic complications: no      Cardiovascular status: blood pressure returned to baseline and hemodynamically stable  Respiratory status: unassisted and spontaneous ventilation  Hydration status: euvolemic  Follow-up not needed.          Vitals Value Taken Time   /92 09/02/22 1600   Temp 36.5 °C (97.7 °F) 09/02/22 1505   Pulse 81 09/02/22 1612   Resp 20 09/02/22 1700   SpO2 98 % 09/02/22 1612         No case tracking events are documented in the log.      Pain/Ki Score: No data recorded

## (undated) DEVICE — PAD DEFIB CADENCE ADULT R2